# Patient Record
Sex: FEMALE | Race: BLACK OR AFRICAN AMERICAN | Employment: PART TIME | URBAN - METROPOLITAN AREA
[De-identification: names, ages, dates, MRNs, and addresses within clinical notes are randomized per-mention and may not be internally consistent; named-entity substitution may affect disease eponyms.]

---

## 2019-02-21 ENCOUNTER — OFFICE VISIT (OUTPATIENT)
Dept: OBGYN CLINIC | Age: 49
End: 2019-02-21

## 2019-02-21 VITALS
RESPIRATION RATE: 18 BRPM | WEIGHT: 165 LBS | SYSTOLIC BLOOD PRESSURE: 145 MMHG | BODY MASS INDEX: 29.23 KG/M2 | HEART RATE: 70 BPM | HEIGHT: 63 IN | DIASTOLIC BLOOD PRESSURE: 82 MMHG

## 2019-02-21 DIAGNOSIS — D25.9 UTERINE LEIOMYOMA, UNSPECIFIED LOCATION: Primary | ICD-10-CM

## 2019-02-21 RX ORDER — NAPROXEN 500 MG/1
500 TABLET ORAL 2 TIMES DAILY WITH MEALS
COMMUNITY
End: 2019-05-04

## 2019-02-21 RX ORDER — TRANEXAMIC ACID 650 1/1
1300 TABLET ORAL 3 TIMES DAILY
Qty: 30 TAB | Refills: 0 | Status: SHIPPED | OUTPATIENT
Start: 2019-02-21 | End: 2019-05-04

## 2019-02-21 NOTE — PROGRESS NOTES
Subjective:      Patient complains of very heavy bleeding which occurs about every 2-3 weeks and lasts a week at a time. She says that the problems has been getting progressively worse the last few years, and that she has been told she has significant fibroids. She also complains of fatigue and occasional dizziness. Current Outpatient Medications   Medication Sig Dispense Refill    naproxen (NAPROSYN) 500 mg tablet Take 500 mg by mouth two (2) times daily (with meals).  tranexamic acid (LYSTEDA) 650 mg tab tablet Take 2 Tabs by mouth three (3) times daily. 30 Tab 0    prenatal multivit-ca-min-fe-fa Tab Take  by mouth.  HYDROcodone-acetaminophen (NORCO) 5-325 mg per tablet Take 1-2 tablets PO every 4-6 hours as needed for pain control. If over the counter ibuprofen or acetaminophen was suggested, then only take the vicodin for pain not well controlled with the over the counter medication. 12 Tab 0     No Known Allergies  Past Medical History:   Diagnosis Date    Low iron      Past Surgical History:   Procedure Laterality Date    HX  SECTION       History reviewed. No pertinent family history. Social History     Tobacco Use    Smoking status: Never Smoker   Substance Use Topics    Alcohol use: Yes     Frequency: Monthly or less     Drinks per session: 3 or 4     Binge frequency: Less than monthly      Review of Systems    A comprehensive review of systems was negative except for that written in the HPI. Objective:     @IPVITALS(8:6,8,9,5,10)@  : Body mass index is 29.23 kg/m². Visit Vitals  /82   Pulse 70   Resp 18   Ht 5' 3\" (1.6 m)   Wt 165 lb (74.8 kg)   LMP 2018   BMI 29.23 kg/m²     General appearance: alert, cooperative, no distress, appears stated age  Head: Normocephalic, without obvious abnormality, atraumatic  Lungs: normal respiratory effort  Abdomen: soft, non-tender.  Bowel sounds normal. No masses,  no organomegaly  Pelvic: External genitalia normal, Vagina normal with active bleeding, cervix normal in appearance, no CMT, uterus enlarged to 18 weeks size and irregular with tenderness anteriorly and on the right, no adnexal masses or tenderness, rectovaginal septum normal        Assessment/Plan:     Uterine fibroids with menometrorrhagia. Pelvic ultrasound ordered, Rx Lysteda for control of bleeding. History of abnormal Pap with uncertain follow up. Records requested. Follow up after US to discuss treatment options. Plan of care discussed. Patient expressed understanding.

## 2019-03-04 ENCOUNTER — HOSPITAL ENCOUNTER (OUTPATIENT)
Dept: ULTRASOUND IMAGING | Age: 49
Discharge: HOME | End: 2019-03-04
Attending: OBSTETRICS & GYNECOLOGY
Payer: MEDICAID

## 2019-03-04 DIAGNOSIS — D25.9 UTERINE LEIOMYOMA, UNSPECIFIED LOCATION: ICD-10-CM

## 2019-03-04 PROCEDURE — 76830 TRANSVAGINAL US NON-OB: CPT

## 2019-03-26 ENCOUNTER — HOSPITAL ENCOUNTER (OUTPATIENT)
Dept: LAB | Age: 49
Discharge: HOME | End: 2019-03-26

## 2019-03-26 ENCOUNTER — OFFICE VISIT (OUTPATIENT)
Dept: OBGYN CLINIC | Age: 49
End: 2019-03-26

## 2019-03-26 VITALS
TEMPERATURE: 97.8 F | WEIGHT: 161 LBS | HEART RATE: 58 BPM | BODY MASS INDEX: 28.53 KG/M2 | SYSTOLIC BLOOD PRESSURE: 148 MMHG | DIASTOLIC BLOOD PRESSURE: 97 MMHG | RESPIRATION RATE: 18 BRPM | HEIGHT: 63 IN

## 2019-03-26 DIAGNOSIS — N92.1 MENOMETRORRHAGIA: Primary | ICD-10-CM

## 2019-03-26 DIAGNOSIS — N92.1 MENOMETRORRHAGIA: ICD-10-CM

## 2019-03-26 DIAGNOSIS — R87.612 LOW GRADE SQUAMOUS INTRAEPITHELIAL LESION ON CYTOLOGIC SMEAR OF CERVIX (LGSIL): ICD-10-CM

## 2019-03-26 DIAGNOSIS — D25.9 UTERINE LEIOMYOMA, UNSPECIFIED LOCATION: ICD-10-CM

## 2019-03-26 DIAGNOSIS — R10.2 PELVIC PAIN IN FEMALE: ICD-10-CM

## 2019-03-26 LAB — XX-LABCORP SPECIMEN COL,LCBCF: NORMAL

## 2019-03-26 PROCEDURE — 99001 SPECIMEN HANDLING PT-LAB: CPT

## 2019-03-26 RX ORDER — MEDROXYPROGESTERONE ACETATE 10 MG/1
10 TABLET ORAL DAILY
Qty: 30 TAB | Refills: 1 | Status: SHIPPED | OUTPATIENT
Start: 2019-03-26 | End: 2019-05-21 | Stop reason: SDUPTHER

## 2019-03-26 NOTE — PROGRESS NOTES
Subjective:      Patient presents for follow up of menometrorrhagia and to review a pelvic ultrasound which was done since her last visit. She states that she has been bleeding at least part of every day since her last visit, and that the Lysteda prescribed for her actually made the bleeding worse. She also complains of small bruises which have begun appearing on her arms and legs without any known trauma, and she complains of right sided pelvic pain. She states that the pain is minor, and that her primary concern is to stop the bleeding. Previous review of her chart showed cervical dysplasia without follow up. Current Outpatient Medications   Medication Sig Dispense Refill    medroxyPROGESTERone (PROVERA) 10 mg tablet Take 1 Tab by mouth daily. 30 Tab 1    naproxen (NAPROSYN) 500 mg tablet Take 500 mg by mouth two (2) times daily (with meals).  tranexamic acid (LYSTEDA) 650 mg tab tablet Take 2 Tabs by mouth three (3) times daily. 30 Tab 0    prenatal multivit-ca-min-fe-fa Tab Take  by mouth.  HYDROcodone-acetaminophen (NORCO) 5-325 mg per tablet Take 1-2 tablets PO every 4-6 hours as needed for pain control. If over the counter ibuprofen or acetaminophen was suggested, then only take the vicodin for pain not well controlled with the over the counter medication. 12 Tab 0     No Known Allergies  Past Medical History:   Diagnosis Date    Low iron      Past Surgical History:   Procedure Laterality Date    HX  SECTION       History reviewed. No pertinent family history. Social History     Tobacco Use    Smoking status: Never Smoker   Substance Use Topics    Alcohol use: Yes     Frequency: Monthly or less     Drinks per session: 3 or 4     Binge frequency: Less than monthly      Review of Systems    A comprehensive review of systems was negative except for that written in the HPI.         Objective:     Visit Vitals  BP (!) 148/97 (BP 1 Location: Left arm, BP Patient Position: Sitting) Pulse (!) 58   Temp 97.8 °F (36.6 °C)   Resp 18   Ht 5' 3\" (1.6 m)   Wt 161 lb (73 kg)   LMP 02/20/2019   BMI 28.52 kg/m²     General appearance: alert, cooperative, no distress, appears stated age  Pelvic: External genitalia normal, Vagina normal without discharge, cervix normal in appearance, no CMT, uterus enlarged to 18 week size with tenderness at the fundus and right uterine border, no adnexal masses or tenderness, rectovaginal septum normal. Pap smear obtained. Pelvic ultrasound from 3/4/19 reviewed and discussed:  1. Enlarged anteverted uterus, measures overall 17.7 x 8.0 x 7.6 cm. Endometrial thickness 7 mm. Heterogeneous hypoechoic cortical mass of the  posterior fundus measures 6.8 x 8.6 x 8.9 cm and is most compatible with a  Fibroid. Previous records reviewed:  3/7/12 Pap shows ASCUS, cannot rule out high grade   5/4/12 surgical pathology shows LGSIL on ECC with normal ectocervical biopsy    Assessment/Plan:     Menometrorrhagia, Rx Provera. Discussed treatment options of endometrial ablation or hysterectomy, but patient states that she desires treatment which will guarantee no bleeding. Will consult with Dr. Rebecca Akers to determine if the patient is a candidate for a robotic hysterectomy. CBC pending  Endocervical dysplasia without follow up, Pap repeated today. Will contact patient with Pap and lab results. Plan of care discussed. Patient expressed understanding.

## 2019-03-27 LAB
BASOPHILS # BLD AUTO: 0.2 X10E3/UL (ref 0–0.2)
BASOPHILS NFR BLD AUTO: 3 %
EOSINOPHIL # BLD AUTO: 0.4 X10E3/UL (ref 0–0.4)
EOSINOPHIL NFR BLD AUTO: 7 %
ERYTHROCYTE [DISTWIDTH] IN BLOOD BY AUTOMATED COUNT: 19.1 % (ref 12.3–15.4)
HCT VFR BLD AUTO: 29 % (ref 34–46.6)
HGB BLD-MCNC: 8.5 G/DL (ref 11.1–15.9)
IMM GRANULOCYTES # BLD AUTO: 0 X10E3/UL (ref 0–0.1)
IMM GRANULOCYTES NFR BLD AUTO: 0 %
LYMPHOCYTES # BLD AUTO: 1.4 X10E3/UL (ref 0.7–3.1)
LYMPHOCYTES NFR BLD AUTO: 27 %
MCH RBC QN AUTO: 20.6 PG (ref 26.6–33)
MCHC RBC AUTO-ENTMCNC: 29.3 G/DL (ref 31.5–35.7)
MCV RBC AUTO: 70 FL (ref 79–97)
MONOCYTES # BLD AUTO: 0.4 X10E3/UL (ref 0.1–0.9)
MONOCYTES NFR BLD AUTO: 8 %
NEUTROPHILS # BLD AUTO: 2.9 X10E3/UL (ref 1.4–7)
NEUTROPHILS NFR BLD AUTO: 55 %
PLATELET # BLD AUTO: 353 X10E3/UL (ref 150–379)
RBC # BLD AUTO: 4.12 X10E6/UL (ref 3.77–5.28)
SPECIMEN STATUS REPORT, ROLRST: NORMAL
WBC # BLD AUTO: 5.2 X10E3/UL (ref 3.4–10.8)

## 2019-03-28 LAB
CYTOLOGIST CVX/VAG CYTO: ABNORMAL
CYTOLOGY CVX/VAG DOC THIN PREP: ABNORMAL
DX ICD CODE: ABNORMAL
DX ICD CODE: ABNORMAL
HPV I/H RISK 4 DNA CVX QL PROBE+SIG AMP: NEGATIVE
Lab: ABNORMAL
OTHER STN SPEC: ABNORMAL
PATH REPORT.FINAL DX SPEC: ABNORMAL
PATHOLOGIST CVX/VAG CYTO: ABNORMAL
STAT OF ADQ CVX/VAG CYTO-IMP: ABNORMAL

## 2019-04-04 ENCOUNTER — OFFICE VISIT (OUTPATIENT)
Dept: OBGYN CLINIC | Age: 49
End: 2019-04-04

## 2019-04-04 VITALS
DIASTOLIC BLOOD PRESSURE: 85 MMHG | HEIGHT: 63 IN | WEIGHT: 162 LBS | RESPIRATION RATE: 18 BRPM | BODY MASS INDEX: 28.7 KG/M2 | HEART RATE: 64 BPM | SYSTOLIC BLOOD PRESSURE: 162 MMHG

## 2019-04-04 DIAGNOSIS — D25.9 UTERINE LEIOMYOMA, UNSPECIFIED LOCATION: ICD-10-CM

## 2019-04-04 DIAGNOSIS — N92.1 MENOMETRORRHAGIA: Primary | ICD-10-CM

## 2019-04-04 NOTE — PROGRESS NOTES
49 y/o  presents to me for surgical consult. She has known large fibroids and is interested in hysterectomy. 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 She has been seen by [facility] and had ultrasound and initial work-up. EMB hasn't been done. Visit Vitals  /85   Pulse 64   Resp 18   Ht 5' 3\" (1.6 m)   Wt 162 lb (73.5 kg)   LMP 2019 (Exact Date)   BMI 28.70 kg/m²     Gen: A&OX3, NAD  Abdomen: 18 week sized uterus. Ultrasound:    UTERUS: Enlarged anteverted uterus, measures overall 17.7 x 8.0 x 7.6 cm. Endometrial thickness 7 mm. Heterogeneous hypoechoic cortical mass of the  posterior fundus measures 6.8 x 8.6 x 8.9 cm and is most compatible with a  fibroid. Nabothian cysts within the cervix noted.     RIGHT OVARY: Right ovary measures 3.2 x 1.9 x 2.5 cm and contains a dominant  follicle measuring 1.9 cm. cm.] No ovarian or adnexal masses identified. Color  and spectral Doppler demonstrate normal flow to the right ovary with no evidence  of ovarian torsion. Arterial and venous waveforms are normal.     LEFT OVARY: Not visualized     No adnexal masses.     OTHER: No free fluid identified. Lab Results   Component Value Date/Time    WBC 5.2 2019 12:00 AM    HGB 8.5 (L) 2019 12:00 AM    HCT 29.0 (L) 2019 12:00 AM    PLATELET 712  12:00 AM    MCV 70 (L) 2019 12:00 AM         A/P  Symptomatic fibroids. Anemia. Reviewed options for treatment. Patient somewhat concerned about the surgical options. The plan of care has been discussed with the patient. She has been given the opportunity to ask questions, which seem to have been answered satisfactorily.

## 2019-05-01 ENCOUNTER — OFFICE VISIT (OUTPATIENT)
Dept: OBGYN CLINIC | Age: 49
End: 2019-05-01

## 2019-05-01 VITALS
TEMPERATURE: 98.1 F | HEART RATE: 95 BPM | SYSTOLIC BLOOD PRESSURE: 173 MMHG | WEIGHT: 167 LBS | HEIGHT: 63 IN | RESPIRATION RATE: 18 BRPM | BODY MASS INDEX: 29.59 KG/M2 | DIASTOLIC BLOOD PRESSURE: 82 MMHG

## 2019-05-01 DIAGNOSIS — N94.6 DYSMENORRHEA: Primary | ICD-10-CM

## 2019-05-01 LAB
HCG URINE, QL. (POC): NEGATIVE
VALID INTERNAL CONTROL?: YES

## 2019-05-01 NOTE — PROGRESS NOTES
51 y/o  presents for EMB. She has large fibroids and desires a hysterectomy. She states she continues to have daily pain and bleeding. ROS: per above    Visit Vitals  /82   Pulse 95   Temp 98.1 °F (36.7 °C) (Oral)   Resp 18   Ht 5' 3\" (1.6 m)   Wt 167 lb (75.8 kg)   LMP  (LMP Unknown)   BMI 29.58 kg/m²     Gen: A&Ox3, NAD  Exam: 18 week sized fibroids. Diffusely TTP    A/P  Large symptomatic fibroids with recent ASCUS pap smear. Recommend removal via hysterectomy. EMB done today. Has uncontrolled hypertension. Clearance form given. Pt. Will call office when clearance obtained for scheduling. The plan of care has been discussed with the patient. She has been given the opportunity to ask questions, which seem to have been answered satisfactorily.

## 2019-05-01 NOTE — PROCEDURES
Endometrial Biopsy Procedure Note    Endometrial biopsy was performed today. Pre-procedural pain: 5:10    Time out: 11:27    Indications: abnormal uterine bleeding    Procedure Details   Urine pregnancy test was done and result is negative. The risks (including infection, bleeding, pain, and uterine perforation) and benefits of the procedure were explained to the her and written informed consent was obtained. Antibiotic prophylaxis against endocarditis was not indicated. She was placed in the dorsal lithotomy position. Bimanual exam showed the uterus to be in the neutral position. A speculum inserted in the vagina, and the cervix prepped with povidone iodine. A \"Pipelle endometrial aspirator\" was used to sample the endometrium. Sample was sent for pathologic examination. The patient tolerated the procedure well and there were no complications. Post procedural pain: 5:10    Plan:  Ms. Mariela Koehler was advised to call for any fever or for prolonged or severe pain or bleeding. She was advised to use OTC ibuprofen as needed for mild to moderate pain. She was advised to avoid vaginal intercourse for 48 hours or until the bleeding has completely stopped. My office will get in touch with her as soon as we have the pathology report.

## 2019-05-01 NOTE — LETTER
NOTIFICATION RETURN TO WORK / SCHOOL 
 
5/1/2019 11:54 AM 
 
Ms. Dorie Vail [address] [place] [id number] To Whom It May Concern: 
 
Dorie Vail is currently under the care of [address] [place] [address]. She will return to work/school on: May 7, 5283 due to gyn complications. If there are questions or concerns please have the patient contact our office. Sincerely, Matt Hand MD

## 2019-05-01 NOTE — PATIENT INSTRUCTIONS
Endometrial Biopsy: About This Test  What is it? An endometrial biopsy is a way for your doctor to take a small sample of the lining of the uterus (endometrium). The sample is looked at under a microscope for abnormal cells. An endometrial biopsy helps your doctor find problems in the endometrium. Why is this test done? An endometrial biopsy is done to check for cancer of the uterus. The test is also done if you have abnormal bleeding from your uterus or are having problems getting pregnant. The test results show how your body's hormones are affecting the lining of the uterus. How can you prepare for the test?  Talk to your doctor about all your health conditions before the test. For example, tell your doctor if you:  · Are or might be pregnant. An endometrial biopsy is not done during pregnancy. · Are taking any medicines. · Are allergic to any medicines. · Have had bleeding problems, or if you take aspirin or some other blood thinner. · Have been treated for an infection in your pelvic area. · Have any heart or lung problems. Other ways to prepare:  · Do not douche, use tampons, or use vaginal medicines for 24 hours before the test.  · Ask your doctor if you should take a pain reliever, such as ibuprofen (Advil or Motrin), 30 to 60 minutes before the test. This can help reduce any cramping pain that the test can cause. · Talk to your doctor if you have concerns about the need for the test, its risks, how it will be done, or what the results may mean. What happens before the test?  · You will empty your bladder just before the test.  · You will be asked to sign a consent form that says you understand the risks of the test and agree to have it done. What happens during the test?  · You will lie on an exam table. Your feet will be in stirrups. · The doctor may use a spray or injection to numb your cervix. The cervix is the opening to the uterus.   · The doctor will use a tool called a speculum to see the cervix. · Then the doctor will pass a thin tube through the cervix to take a sample of the uterus lining. You may feel a sharp cramp when the doctor collects the sample. · The sample is sent to a lab. How long does the test take? The test will take about 5 to 15 minutes. What happens after the test?  · You will probably be able to go home right away. · You likely will have mild vaginal bleeding and may have cramps for a few days after the test. The cramps may feel like bad menstrual cramps. · Ask your doctor if you can take an over-the-counter pain medicine, such as acetaminophen (Tylenol), ibuprofen (Advil, Motrin), or naproxen (Aleve). Be safe with medicines. Read and follow all instructions on the label. · Do not have sex, use tampons, or douche until the spotting stops. Use pads for vaginal bleeding or discharge. · Do not do strenuous exercise or heavy lifting for one day after your biopsy. Follow-up care is a key part of your treatment and safety. Be sure to make and go to all appointments, and call your doctor if you are having problems. It's also a good idea to keep a list of the medicines you take. Ask your doctor when you can expect to have your test results. Where can you learn more? Go to http://sherrie-wally.info/. Enter [phone] in the search box to learn more about \"Endometrial Biopsy: About This Test.\"  Current as of: May 14, 2018  Content Version: 11.9  © 6717-1325 [employer]. Care instructions adapted under license by [employer] (which disclaims liability or warranty for this information). If you have questions about a medical condition or this instruction, always ask your healthcare professional. [address] any warranty or liability for your use of this information.

## 2019-05-01 NOTE — PROGRESS NOTES
Patient in office today for Endometrial Biopsy procedure performed by Concepción Celis  Patient consent signed and witness   Time out 1227 start time 1231end time 1235  Pre procedural pain 5/10   Post procedural pain 5/10  Biopsy  sent to lab   Follow up instruction given to  Follow up in office in 2 weeks

## 2019-05-04 ENCOUNTER — APPOINTMENT (OUTPATIENT)
Dept: ULTRASOUND IMAGING | Age: 49
End: 2019-05-04
Attending: NURSE PRACTITIONER
Payer: MEDICAID

## 2019-05-04 ENCOUNTER — HOSPITAL ENCOUNTER (EMERGENCY)
Age: 49
Discharge: HOME | End: 2019-05-04
Attending: EMERGENCY MEDICINE
Payer: MEDICAID

## 2019-05-04 VITALS
TEMPERATURE: 99.2 F | WEIGHT: 160 LBS | RESPIRATION RATE: 19 BRPM | HEART RATE: 71 BPM | HEIGHT: 63 IN | OXYGEN SATURATION: 98 % | DIASTOLIC BLOOD PRESSURE: 107 MMHG | SYSTOLIC BLOOD PRESSURE: 142 MMHG | BODY MASS INDEX: 28.35 KG/M2

## 2019-05-04 DIAGNOSIS — N93.8 DUB (DYSFUNCTIONAL UTERINE BLEEDING): Primary | ICD-10-CM

## 2019-05-04 DIAGNOSIS — D25.9 UTERINE LEIOMYOMA, UNSPECIFIED LOCATION: ICD-10-CM

## 2019-05-04 DIAGNOSIS — R10.2 PELVIC PAIN: ICD-10-CM

## 2019-05-04 LAB
ABO + RH BLD: NORMAL
ALBUMIN SERPL-MCNC: 3.6 G/DL (ref 3.4–5)
ALBUMIN/GLOB SERPL: 0.8 {RATIO} (ref 0.8–1.7)
ALP SERPL-CCNC: 71 U/L (ref 45–117)
ALT SERPL-CCNC: 19 U/L (ref 13–56)
ANION GAP SERPL CALC-SCNC: 7 MMOL/L (ref 3–18)
APPEARANCE UR: CLEAR
AST SERPL-CCNC: 15 U/L (ref 15–37)
BACTERIA URNS QL MICRO: ABNORMAL /HPF
BASOPHILS # BLD: 0.1 K/UL (ref 0–0.1)
BASOPHILS NFR BLD: 2 % (ref 0–2)
BILIRUB SERPL-MCNC: 0.3 MG/DL (ref 0.2–1)
BILIRUB UR QL: NEGATIVE
BLOOD GROUP ANTIBODIES SERPL: NORMAL
BUN SERPL-MCNC: 7 MG/DL (ref 7–18)
BUN/CREAT SERPL: 10 (ref 12–20)
CALCIUM SERPL-MCNC: 8.7 MG/DL (ref 8.5–10.1)
CHLORIDE SERPL-SCNC: 107 MMOL/L (ref 100–108)
CO2 SERPL-SCNC: 25 MMOL/L (ref 21–32)
COLOR UR: YELLOW
CREAT SERPL-MCNC: 0.72 MG/DL (ref 0.6–1.3)
DIFFERENTIAL METHOD BLD: ABNORMAL
EOSINOPHIL # BLD: 0.2 K/UL (ref 0–0.4)
EOSINOPHIL NFR BLD: 3 % (ref 0–5)
EPITH CASTS URNS QL MICRO: ABNORMAL /LPF (ref 0–5)
ERYTHROCYTE [DISTWIDTH] IN BLOOD BY AUTOMATED COUNT: 18.3 % (ref 11.6–14.5)
GLOBULIN SER CALC-MCNC: 4.3 G/DL (ref 2–4)
GLUCOSE SERPL-MCNC: 81 MG/DL (ref 74–99)
GLUCOSE UR STRIP.AUTO-MCNC: NEGATIVE MG/DL
HCT VFR BLD AUTO: 29.5 % (ref 35–45)
HGB BLD-MCNC: 9.1 G/DL (ref 12–16)
HGB UR QL STRIP: ABNORMAL
KETONES UR QL STRIP.AUTO: NEGATIVE MG/DL
LEUKOCYTE ESTERASE UR QL STRIP.AUTO: ABNORMAL
LYMPHOCYTES # BLD: 1.7 K/UL (ref 0.9–3.6)
LYMPHOCYTES NFR BLD: 27 % (ref 21–52)
MCH RBC QN AUTO: 20.8 PG (ref 24–34)
MCHC RBC AUTO-ENTMCNC: 30.8 G/DL (ref 31–37)
MCV RBC AUTO: 67.4 FL (ref 74–97)
MONOCYTES # BLD: 0.5 K/UL (ref 0.05–1.2)
MONOCYTES NFR BLD: 7 % (ref 3–10)
NEUTS SEG # BLD: 3.8 K/UL (ref 1.8–8)
NEUTS SEG NFR BLD: 61 % (ref 40–73)
NITRITE UR QL STRIP.AUTO: NEGATIVE
PH UR STRIP: 6.5 [PH] (ref 5–8)
PLATELET # BLD AUTO: 387 K/UL (ref 135–420)
PMV BLD AUTO: 10.2 FL (ref 9.2–11.8)
POTASSIUM SERPL-SCNC: 3.6 MMOL/L (ref 3.5–5.5)
PROT SERPL-MCNC: 7.9 G/DL (ref 6.4–8.2)
PROT UR STRIP-MCNC: NEGATIVE MG/DL
RBC # BLD AUTO: 4.38 M/UL (ref 4.2–5.3)
RBC #/AREA URNS HPF: ABNORMAL /HPF (ref 0–5)
SERVICE CMNT-IMP: NORMAL
SODIUM SERPL-SCNC: 139 MMOL/L (ref 136–145)
SP GR UR REFRACTOMETRY: 1.01 (ref 1–1.03)
SPECIMEN EXP DATE BLD: NORMAL
UROBILINOGEN UR QL STRIP.AUTO: 1 EU/DL (ref 0.2–1)
WBC # BLD AUTO: 6.3 K/UL (ref 4.6–13.2)
WBC URNS QL MICRO: ABNORMAL /HPF (ref 0–4)
WET PREP GENITAL: NORMAL

## 2019-05-04 PROCEDURE — 76856 US EXAM PELVIC COMPLETE: CPT

## 2019-05-04 PROCEDURE — 80053 COMPREHEN METABOLIC PANEL: CPT

## 2019-05-04 PROCEDURE — 96374 THER/PROPH/DIAG INJ IV PUSH: CPT

## 2019-05-04 PROCEDURE — 96375 TX/PRO/DX INJ NEW DRUG ADDON: CPT

## 2019-05-04 PROCEDURE — 87210 SMEAR WET MOUNT SALINE/INK: CPT

## 2019-05-04 PROCEDURE — 81001 URINALYSIS AUTO W/SCOPE: CPT

## 2019-05-04 PROCEDURE — 99284 EMERGENCY DEPT VISIT MOD MDM: CPT

## 2019-05-04 PROCEDURE — 86900 BLOOD TYPING SEROLOGIC ABO: CPT

## 2019-05-04 PROCEDURE — 87491 CHLMYD TRACH DNA AMP PROBE: CPT

## 2019-05-04 PROCEDURE — 85025 COMPLETE CBC W/AUTO DIFF WBC: CPT

## 2019-05-04 PROCEDURE — 74011250637 HC RX REV CODE- 250/637: Performed by: NURSE PRACTITIONER

## 2019-05-04 PROCEDURE — 74011250636 HC RX REV CODE- 250/636: Performed by: NURSE PRACTITIONER

## 2019-05-04 RX ORDER — KETOROLAC TROMETHAMINE 30 MG/ML
15 INJECTION, SOLUTION INTRAMUSCULAR; INTRAVENOUS
Status: COMPLETED | OUTPATIENT
Start: 2019-05-04 | End: 2019-05-04

## 2019-05-04 RX ORDER — IBUPROFEN 800 MG/1
800 TABLET ORAL
Qty: 20 TAB | Refills: 0 | Status: SHIPPED | OUTPATIENT
Start: 2019-05-04 | End: 2019-05-11

## 2019-05-04 RX ORDER — OXYCODONE AND ACETAMINOPHEN 5; 325 MG/1; MG/1
1 TABLET ORAL
Status: COMPLETED | OUTPATIENT
Start: 2019-05-04 | End: 2019-05-04

## 2019-05-04 RX ORDER — MORPHINE SULFATE 4 MG/ML
4 INJECTION INTRAVENOUS
Status: DISCONTINUED | OUTPATIENT
Start: 2019-05-04 | End: 2019-05-04

## 2019-05-04 RX ORDER — MORPHINE SULFATE 4 MG/ML
4 INJECTION INTRAVENOUS
Status: COMPLETED | OUTPATIENT
Start: 2019-05-04 | End: 2019-05-04

## 2019-05-04 RX ORDER — ONDANSETRON 2 MG/ML
4 INJECTION INTRAMUSCULAR; INTRAVENOUS
Status: COMPLETED | OUTPATIENT
Start: 2019-05-04 | End: 2019-05-04

## 2019-05-04 RX ADMIN — OXYCODONE HYDROCHLORIDE AND ACETAMINOPHEN 1 TABLET: 5; 325 TABLET ORAL at 16:11

## 2019-05-04 RX ADMIN — ONDANSETRON 4 MG: 2 INJECTION INTRAMUSCULAR; INTRAVENOUS at 14:36

## 2019-05-04 RX ADMIN — MORPHINE SULFATE 4 MG: 4 INJECTION INTRAVENOUS at 14:36

## 2019-05-04 RX ADMIN — KETOROLAC TROMETHAMINE 15 MG: 30 INJECTION, SOLUTION INTRAMUSCULAR at 16:11

## 2019-05-04 NOTE — ED NOTES
Patient arrived with a CC of heavy vaginal bleeding for 4 days since endometrial biopsy was completed. Patient reports soaking through a pad an hour since procedure and worsening pain. Patient smokes to her GYN who told her to come into the ED for further evaluation. I performed a brief evaluation, including history and physical, of the patient here in triage and I have determined that pt will need further treatment and evaluation from the main side ER physician. I have placed initial orders to help in expediting patients care. May 04, 2019 at 1:38 PM - Rupa Steinberg NP Visit Vitals /84 (BP 1 Location: Right arm, BP Patient Position: Sitting) Pulse 71 Temp 99.2 °F (37.3 °C) Resp 19 Ht 5' 3\" (1.6 m) Wt 72.6 kg (160 lb) SpO2 98% BMI 28.34 kg/m² 1:39 PM I called and left a message on Dr Long PAULINO regarding patient

## 2019-05-04 NOTE — ED PROVIDER NOTES
2:10 PM    50 y.o. female presents to ED C/O vaginal bleeding, lower abdominal pain. Patient has a HX of , low iron. Patient reports she had a endometrial biopsy 4 days ago. Patient reports since the night of the biopsy she has had worsening vaginal bleeding cramping, patient reports going to a pad an hour since onset of bleeding. Patient reports worsening cramping in lower abdomen today. Patient called her GYN today who told her to go the emergency department. Patient does report some mild lightheadedness denies dizziness, shortness of breath, chest pain. She is a non-smoker. Patient denies any other symptoms or complaints. Past Medical History:   Diagnosis Date    Low iron        Past Surgical History:   Procedure Laterality Date    HX  SECTION           History reviewed. No pertinent family history.     Social History     Socioeconomic History    Marital status: SINGLE     Spouse name: Not on file    Number of children: Not on file    Years of education: Not on file    Highest education level: Not on file   Occupational History    Not on file   Social Needs    Financial resource strain: Not on file    Food insecurity:     Worry: Not on file     Inability: Not on file    Transportation needs:     Medical: Not on file     Non-medical: Not on file   Tobacco Use    Smoking status: Never Smoker    Smokeless tobacco: Never Used   Substance and Sexual Activity    Alcohol use: Yes     Frequency: Monthly or less     Drinks per session: 3 or 4     Binge frequency: Less than monthly    Drug use: No    Sexual activity: Not Currently     Partners: Male     Birth control/protection: None   Lifestyle    Physical activity:     Days per week: Not on file     Minutes per session: Not on file    Stress: Not on file   Relationships    Social connections:     Talks on phone: Not on file     Gets together: Not on file     Attends Jew service: Not on file     Active member of club or organization: Not on file     Attends meetings of clubs or organizations: Not on file     Relationship status: Not on file    Intimate partner violence:     Fear of current or ex partner: Not on file     Emotionally abused: Not on file     Physically abused: Not on file     Forced sexual activity: Not on file   Other Topics Concern    Not on file   Social History Narrative    Not on file         ALLERGIES: Patient has no known allergies. Review of Systems   Constitutional: Negative for appetite change and fever. HENT: Negative for congestion, rhinorrhea and sore throat. Respiratory: Negative for cough, shortness of breath and wheezing. Cardiovascular: Negative for chest pain and leg swelling. Gastrointestinal: Positive for abdominal pain. Negative for constipation, diarrhea, nausea and vomiting. Genitourinary: Positive for pelvic pain and vaginal bleeding. Negative for dysuria. Musculoskeletal: Negative for arthralgias and back pain. Neurological: Negative for dizziness, syncope and headaches. All other systems reviewed and are negative. Vitals:    05/04/19 1335 05/04/19 1547   BP: 138/84 (!) 157/92   Pulse: 71    Resp: 19    Temp: 99.2 °F (37.3 °C)    SpO2: 98%    Weight: 72.6 kg (160 lb)    Height: 5' 3\" (1.6 m)             Physical Exam   Constitutional: She is oriented to person, place, and time. She appears well-developed and well-nourished. No distress. HENT:   Right Ear: External ear normal.   Left Ear: External ear normal.   Mouth/Throat: Oropharynx is clear and moist.   Cardiovascular: Normal rate, regular rhythm and intact distal pulses. Pulmonary/Chest: Effort normal and breath sounds normal. No stridor. No respiratory distress. She has no wheezes. She has no rales. Abdominal: Soft. Bowel sounds are normal. There is tenderness in the suprapubic area. There is no rigidity, no guarding and no CVA tenderness.    Genitourinary:   Genitourinary Comments: Please see pelvic exam.   Musculoskeletal: Normal range of motion. She exhibits no edema or tenderness. Neurological: She is alert and oriented to person, place, and time. She exhibits normal muscle tone. Coordination normal.   Skin: Skin is warm and dry. She is not diaphoretic. No erythema. Nursing note and vitals reviewed. MDM  Number of Diagnoses or Management Options  Diagnosis management comments: Differential Diagnosis:  Menses, AUB/DUB, , normal pregnancy, uterine leiomyomas, cervical or endometrial polyps, pelvic tumors, systemic disorders, hypothyroidism,exogenous hormone use,     Plan: CBC, CMP, type and screen, pelvic ultrasound. Pelvic exam, UA      4:17 PM reassuring ultrasound with evidence of fibroid without significant free fluid or other abnormality. Reviewed with Dr. Grant agrees with plan for discharge home. Educated take 800 mg Motrin, increase Provera to 2 pills for 3 days. Follow-up with Dr. Ally Arnold on Monday. Patient's H&H improved from previous studies, no evidence of concerning bleeding. Patient educated to return the ED for any new or worsening symptoms. Patient referred to primary care doctor for further evaluation of elevated blood pressure. Patient denies further questions. Amount and/or Complexity of Data Reviewed  Clinical lab tests: ordered and reviewed  Tests in the radiology section of CPT®: ordered and reviewed      ED Course as of May 04 1620   Sat May 04, 2019   1357 CONSULT NOTE:   1:57 PM  I spoke with dR Olvera   Specialty: gyn  Discussed pt's hx, disposition, and available diagnostic and imaging results. Reviewed care plans. Consulting physician agrees with plans as outlined. Agrees with labs ordered and plan to order pelvic ultrasound.  .         [SH]   6080 Patient to ultrasound    [SH]   1553 Patient reports worsening pain after returning from ultrasound, will order 1 additional dose of pain medication.    [SH]   1601 Dr Grant in the department evaluating patient.     []   1115 CONSULT NOTE:   4:05 PM  I spoke with Dr Alyce Malin   Specialty: GYN  Discussed pt's hx, disposition, and available diagnostic and imaging results. Reviewed care plans. Consulting physician agrees with plans as outlined. Due to imaging findings, improving H&H, recommends discharging home with 800 mg Motrin, and doubling Provera for 3 days. .         []      ED Course User Index  [] Kaleigh Nelson NP       Pelvic Exam  Date/Time: 5/4/2019 2:36 PM  Performed by: LEYDA  Chaperone: [place] RN. Type of exam performed: bimanual and speculum. External genitalia appearance: normal.    Vaginal exam:  bleeding. Bleeding: moderate  Cervical exam:  inadequately visualized. Bimanual exam:  uterine tenderness (Adnexal tenderness palpation, uterine tenderness or border. ).                 RESULTS:    US PELV NON OB W TV    (Results Pending)       Labs Reviewed   CBC WITH AUTOMATED DIFF - Abnormal; Notable for the following components:       Result Value    HGB 9.1 (*)     HCT 29.5 (*)     MCV 67.4 (*)     MCH 20.8 (*)     MCHC 30.8 (*)     RDW 18.3 (*)     All other components within normal limits   METABOLIC PANEL, COMPREHENSIVE - Abnormal; Notable for the following components:    BUN/Creatinine ratio 10 (*)     Globulin 4.3 (*)     All other components within normal limits   URINALYSIS W/ RFLX MICROSCOPIC - Abnormal; Notable for the following components:    Blood LARGE (*)     Leukocyte Esterase TRACE (*)     All other components within normal limits   URINE MICROSCOPIC ONLY - Abnormal; Notable for the following components:    Bacteria FEW (*)     All other components within normal limits   WET PREP   CHLAMYDIA/NEISSERIA AMPLIFICATION   TYPE & SCREEN       Recent Results (from the past 12 hour(s))   CBC WITH AUTOMATED DIFF    Collection Time: 05/04/19  1:45 PM   Result Value Ref Range    WBC 6.3 4.6 - 13.2 K/uL    RBC 4.38 4.20 - 5.30 M/uL    HGB 9.1 (L) 12.0 - 16.0 g/dL    HCT 29.5 (L) 35.0 - 45.0 %    MCV 67.4 (L) 74.0 - 97.0 FL    MCH 20.8 (L) 24.0 - 34.0 PG    MCHC 30.8 (L) 31.0 - 37.0 g/dL    RDW 18.3 (H) 11.6 - 14.5 %    PLATELET 732 994 - 679 K/uL    MPV 10.2 9.2 - 11.8 FL    NEUTROPHILS 61 40 - 73 %    LYMPHOCYTES 27 21 - 52 %    MONOCYTES 7 3 - 10 %    EOSINOPHILS 3 0 - 5 %    BASOPHILS 2 0 - 2 %    ABS. NEUTROPHILS 3.8 1.8 - 8.0 K/UL    ABS. LYMPHOCYTES 1.7 0.9 - 3.6 K/UL    ABS. MONOCYTES 0.5 0.05 - 1.2 K/UL    ABS. EOSINOPHILS 0.2 0.0 - 0.4 K/UL    ABS. BASOPHILS 0.1 0.0 - 0.1 K/UL    DF AUTOMATED     METABOLIC PANEL, COMPREHENSIVE    Collection Time: 05/04/19  1:45 PM   Result Value Ref Range    Sodium 139 136 - 145 mmol/L    Potassium 3.6 3.5 - 5.5 mmol/L    Chloride 107 100 - 108 mmol/L    CO2 25 21 - 32 mmol/L    Anion gap 7 3.0 - 18 mmol/L    Glucose 81 74 - 99 mg/dL    BUN 7 7.0 - 18 MG/DL    Creatinine 0.72 0.6 - 1.3 MG/DL    BUN/Creatinine ratio 10 (L) 12 - 20      GFR est AA >60 >60 ml/min/1.73m2    GFR est non-AA >60 >60 ml/min/1.73m2    Calcium 8.7 8.5 - 10.1 MG/DL    Bilirubin, total 0.3 0.2 - 1.0 MG/DL    ALT (SGPT) 19 13 - 56 U/L    AST (SGOT) 15 15 - 37 U/L    Alk.  phosphatase 71 45 - 117 U/L    Protein, total 7.9 6.4 - 8.2 g/dL    Albumin 3.6 3.4 - 5.0 g/dL    Globulin 4.3 (H) 2.0 - 4.0 g/dL    A-G Ratio 0.8 0.8 - 1.7     URINALYSIS W/ RFLX MICROSCOPIC    Collection Time: 05/04/19  2:16 PM   Result Value Ref Range    Color YELLOW      Appearance CLEAR      Specific gravity 1.009 1.005 - 1.030      pH (UA) 6.5 5.0 - 8.0      Protein NEGATIVE  NEG mg/dL    Glucose NEGATIVE  NEG mg/dL    Ketone NEGATIVE  NEG mg/dL    Bilirubin NEGATIVE  NEG      Blood LARGE (A) NEG      Urobilinogen 1.0 0.2 - 1.0 EU/dL    Nitrites NEGATIVE  NEG      Leukocyte Esterase TRACE (A) NEG     URINE MICROSCOPIC ONLY    Collection Time: 05/04/19  2:16 PM   Result Value Ref Range    WBC 2 to 3 0 - 4 /hpf    RBC TOO NUMEROUS TO COUNT 0 - 5 /hpf    Epithelial cells FEW 0 - 5 /lpf    Bacteria FEW (A) NEG /hpf   TYPE & SCREEN    Collection Time: 05/04/19  2:20 PM   Result Value Ref Range    Crossmatch Expiration 05/07/2019     ABO/Rh(D) May Shock POSITIVE     Antibody screen NEG    WET PREP    Collection Time: 05/04/19  2:20 PM   Result Value Ref Range    Special Requests: NO SPECIAL REQUESTS      Wet prep NO YEAST,TRICHOMONAS OR CLUE CELLS NOTED         PROGRESS NOTE:   2:10 PM   Initial assessment completed. Written by Kimber Márquez NP-C    DISCHARGE NOTE:  4:20 PM   Lanie Forman  results have been reviewed with her. She has been counseled regarding her diagnosis, treatment, and plan. She verbally conveys understanding and agreement of the signs, symptoms, diagnosis, treatment and prognosis and additionally agrees to follow up as discussed. She also agrees with the care-plan and conveys that all of her questions have been answered. I have also provided discharge instructions for her that include: educational information regarding their diagnosis and treatment, and list of reasons why they would want to return to the ED prior to their follow-up appointment, should her condition change. CLINICAL IMPRESSION:    1. DUB (dysfunctional uterine bleeding)    2. Pelvic pain    3. Uterine leiomyoma, unspecified location        AFTER VISIT PLAN:    Current Discharge Medication List      START taking these medications    Details   ibuprofen (MOTRIN) 800 mg tablet Take 1 Tab by mouth every six (6) hours as needed for Pain for up to 7 days.   Qty: 20 Tab, Refills: 0              Follow-up Information     Follow up With Specialties Details Why Contact Info    iX Aleman MD Internal Medicine Schedule an appointment as soon as possible for a visit in 1 week Further evaluation [id number] [place] [address]  [address] [place] [address] [facility]      Magaly Harper MD Obstetrics & Gynecology, Gynecology, Obstetrics Schedule an appointment as soon as possible for a visit in 2 days Further evaluation - call monday early to get seen on Monday [place] [address] Doctor Jakob Edmondson [place] [id number] [phone]             Written by Yaron HERNANDEZC

## 2019-05-04 NOTE — ED TRIAGE NOTES
Patient had a biopsy on Wednesday, patient has had vaginal bleeding since than, called doctor and told to come to ER

## 2019-05-04 NOTE — DISCHARGE INSTRUCTIONS
Patient Education        Take 2 tabs of Provera for the next 3 days. Abnormal Uterine Bleeding: Care Instructions  Your Care Instructions    Abnormal uterine bleeding (AUB) is irregular bleeding from the uterus that is longer or heavier than usual or does not occur at your regular time. Sometimes it is caused by changes in hormone levels. It can also be caused by growths in the uterus, such as fibroids or polyps. Sometimes a cause cannot be found. You may have heavy bleeding when you are not expecting your period. Your doctor may suggest a pregnancy test, if you think you are pregnant. Follow-up care is a key part of your treatment and safety. Be sure to make and go to all appointments, and call your doctor if you are having problems. It's also a good idea to know your test results and keep a list of the medicines you take. How can you care for yourself at home? · Be safe with medicines. Take pain medicines exactly as directed. ? If the doctor gave you a prescription medicine for pain, take it as prescribed. ? If you are not taking a prescription pain medicine, ask your doctor if you can take an over-the-counter medicine. · You may be low in iron because of blood loss. Eat a balanced diet that is high in iron and vitamin C. Foods rich in iron include red meat, shellfish, eggs, beans, and leafy green vegetables. Talk to your doctor about whether you need to take iron pills or a multivitamin. When should you call for help? Call 911 anytime you think you may need emergency care.  For example, call if:    · You passed out (lost consciousness).    Call your doctor now or seek immediate medical care if:    · You have new or worse belly or pelvic pain.     · You have severe vaginal bleeding.     · You feel dizzy or lightheaded, or you feel like you may faint.    Watch closely for changes in your health, and be sure to contact your doctor if:    · You think you may be pregnant.     · Your bleeding gets worse.     · You do not get better as expected. Where can you learn more? Go to http://sherrie-wally.info/. Enter K503 in the search box to learn more about \"Abnormal Uterine Bleeding: Care Instructions. \"  Current as of: May 14, 2018  Content Version: 11.9  © 7131-8145 [employer]. Care instructions adapted under license by [employer] (which disclaims liability or warranty for this information). If you have questions about a medical condition or this instruction, always ask your healthcare professional. [address] any warranty or liability for your use of this information.

## 2019-05-07 LAB
C TRACH RRNA SPEC QL NAA+PROBE: NEGATIVE
DX ICD CODE: NORMAL
DX ICD CODE: NORMAL
N GONORRHOEA RRNA SPEC QL NAA+PROBE: NEGATIVE
PATH REPORT.FINAL DX SPEC: NORMAL
PATH REPORT.GROSS SPEC: NORMAL
PATH REPORT.RELEVANT HX SPEC: NORMAL
PATH REPORT.SITE OF ORIGIN SPEC: NORMAL
PATHOLOGIST NAME: NORMAL
PAYMENT PROCEDURE: NORMAL
SPECIMEN SOURCE: NORMAL

## 2019-05-09 ENCOUNTER — OFFICE VISIT (OUTPATIENT)
Dept: FAMILY MEDICINE CLINIC | Age: 49
End: 2019-05-09

## 2019-05-09 ENCOUNTER — TELEPHONE (OUTPATIENT)
Dept: FAMILY MEDICINE CLINIC | Age: 49
End: 2019-05-09

## 2019-05-09 VITALS
HEART RATE: 60 BPM | DIASTOLIC BLOOD PRESSURE: 78 MMHG | SYSTOLIC BLOOD PRESSURE: 148 MMHG | OXYGEN SATURATION: 100 % | HEIGHT: 63 IN | TEMPERATURE: 98.1 F | WEIGHT: 160.6 LBS | BODY MASS INDEX: 28.46 KG/M2 | RESPIRATION RATE: 16 BRPM

## 2019-05-09 DIAGNOSIS — D21.9 FIBROIDS: ICD-10-CM

## 2019-05-09 DIAGNOSIS — Z76.89 ENCOUNTER TO ESTABLISH CARE: ICD-10-CM

## 2019-05-09 DIAGNOSIS — D64.9 ANEMIA, UNSPECIFIED TYPE: ICD-10-CM

## 2019-05-09 DIAGNOSIS — I10 ESSENTIAL HYPERTENSION: Primary | ICD-10-CM

## 2019-05-09 RX ORDER — AMLODIPINE BESYLATE 5 MG/1
5 TABLET ORAL DAILY
Qty: 30 TAB | Refills: 0 | Status: SHIPPED | OUTPATIENT
Start: 2019-05-09 | End: 2019-05-21 | Stop reason: SDUPTHER

## 2019-05-09 NOTE — PATIENT INSTRUCTIONS
High Blood Pressure: Care Instructions  Overview    It's normal for blood pressure to go up and down throughout the day. But if it stays up, you have high blood pressure. Another name for high blood pressure is hypertension. Despite what a lot of people think, high blood pressure usually doesn't cause headaches or make you feel dizzy or lightheaded. It usually has no symptoms. But it does increase your risk of stroke, heart attack, and other problems. You and your doctor will talk about your risks of these problems based on your blood pressure. Your doctor will give you a goal for your blood pressure. Your goal will be based on your health and your age. Lifestyle changes, such as eating healthy and being active, are always important to help lower blood pressure. You might also take medicine to reach your blood pressure goal.  Follow-up care is a key part of your treatment and safety. Be sure to make and go to all appointments, and call your doctor if you are having problems. It's also a good idea to know your test results and keep a list of the medicines you take. How can you care for yourself at home? Medical treatment  · If you stop taking your medicine, your blood pressure will go back up. You may take one or more types of medicine to lower your blood pressure. Be safe with medicines. Take your medicine exactly as prescribed. Call your doctor if you think you are having a problem with your medicine. · Talk to your doctor before you start taking aspirin every day. Aspirin can help certain people lower their risk of a heart attack or stroke. But taking aspirin isn't right for everyone, because it can cause serious bleeding. · See your doctor regularly. You may need to see the doctor more often at first or until your blood pressure comes down. · If you are taking blood pressure medicine, talk to your doctor before you take decongestants or anti-inflammatory medicine, such as ibuprofen.  Some of these medicines can raise blood pressure. · Learn how to check your blood pressure at home. Lifestyle changes  · Stay at a healthy weight. This is especially important if you put on weight around the waist. Losing even 10 pounds can help you lower your blood pressure. · If your doctor recommends it, get more exercise. Walking is a good choice. Bit by bit, increase the amount you walk every day. Try for at least 30 minutes on most days of the week. You also may want to swim, bike, or do other activities. · Avoid or limit alcohol. Talk to your doctor about whether you can drink any alcohol. · Try to limit how much sodium you eat to less than 2,300 milligrams (mg) a day. Your doctor may ask you to try to eat less than 1,500 mg a day. · Eat plenty of fruits (such as bananas and oranges), vegetables, legumes, whole grains, and low-fat dairy products. · Lower the amount of saturated fat in your diet. Saturated fat is found in animal products such as milk, cheese, and meat. Limiting these foods may help you lose weight and also lower your risk for heart disease. · Do not smoke. Smoking increases your risk for heart attack and stroke. If you need help quitting, talk to your doctor about stop-smoking programs and medicines. These can increase your chances of quitting for good. When should you call for help? Call 911 anytime you think you may need emergency care. This may mean having symptoms that suggest that your blood pressure is causing a serious heart or blood vessel problem. Your blood pressure may be over 180/120.   For example, call 911 if:    · You have symptoms of a heart attack. These may include:  ? Chest pain or pressure, or a strange feeling in the chest.  ? Sweating. ? Shortness of breath. ? Nausea or vomiting. ? Pain, pressure, or a strange feeling in the back, neck, jaw, or upper belly or in one or both shoulders or arms. ? Lightheadedness or sudden weakness.   ? A fast or irregular heartbeat.     · You have symptoms of a stroke. These may include:  ? Sudden numbness, tingling, weakness, or loss of movement in your face, arm, or leg, especially on only one side of your body. ? Sudden vision changes. ? Sudden trouble speaking. ? Sudden confusion or trouble understanding simple statements. ? Sudden problems with walking or balance. ? A sudden, severe headache that is different from past headaches.     · You have severe back or belly pain.    Do not wait until your blood pressure comes down on its own. Get help right away.   Call your doctor now or seek immediate care if:    · Your blood pressure is much higher than normal (such as 180/120 or higher), but you don't have symptoms.     · You think high blood pressure is causing symptoms, such as:  ? Severe headache.  ? Blurry vision.    Watch closely for changes in your health, and be sure to contact your doctor if:    · Your blood pressure measures higher than your doctor recommends at least 2 times. That means the top number is higher or the bottom number is higher, or both.     · You think you may be having side effects from your blood pressure medicine. Where can you learn more? Go to http://sherrie-wally.info/. Enter X064 in the search box to learn more about \"High Blood Pressure: Care Instructions. \"  Current as of: July 22, 2018  Content Version: 11.9  © 7052-5427 [employer]. Care instructions adapted under license by [employer] (which disclaims liability or warranty for this information). If you have questions about a medical condition or this instruction, always ask your healthcare professional. [place] [address] any warranty or liability for your use of this information.

## 2019-05-09 NOTE — PROGRESS NOTES
Raymundo Cyr is a 50 y.o. female  Chief Complaint   Patient presents with   [facility] Care     1. Have you been to the ER, urgent care clinic since your last visit? Hospitalized since your last visit? Yes Reason for visit: 5/4/19, bleeding    2. Have you seen or consulted any other health care providers outside of the [address] [place] [address] since your last visit? Include any pap smears or colon screening.  No

## 2019-05-09 NOTE — TELEPHONE ENCOUNTER
Spoke with Laina Osuna, Verified 2 patient identifiers. Spoke with patient in regards to pre op exam appointment date change. Patient made aware of new appointment date of [email or web address]. Patient is aware that appointment is with Dr. Kaia Arnold.  Patient acknowledges understanding and voices no further questions or concerns at this time

## 2019-05-09 NOTE — TELEPHONE ENCOUNTER
Spoke with Tiffani Dillon, Verified 2 patient identifiers. Spoke with patient in regards to scheduling her pre op exam. Patient was given her upcoming pre op exam appointment for 5/13/19 @ 8am. Patient made aware that her appointment will be with Dr. Amy Puckett.    Patient acknowledges understanding and voices no further questions or concerns at this time

## 2019-05-09 NOTE — TELEPHONE ENCOUNTER
Returning physicians phone call in regards to mutual patient. Spoke with Dr. Chick Kanner. Patient needs clearance ASAP due to bleeding. Made Dr. Chick Kanner aware that another physician in the office will do the preop clearance. She verbalized understanding. Will call patient with appointment.

## 2019-05-09 NOTE — TELEPHONE ENCOUNTER
PSR for Dr. Klaudia Marquis's office across the street called in and was wanting to speak with Walter Soria in regards to the patient. I tried calling Walter Soria and she was with a patient and the nurse was on the phone so I let the PSR know that Walter Soria and the nurse were unavailable so I would have Walter Soria call back when she was available. The PSR then got really rude and asked \"So she doesn't come to the phone to speak with another physician? \" I let her know she was in with a patient and the nurse was unavailable and apologized for the inconvenience and she thanked me and mumbled something else as she hung up.  The callback number is [phone]

## 2019-05-09 NOTE — PROGRESS NOTES
Salima Love is a 50 y.o.  female and presents with    Chief Complaint   Patient presents with    Establish Care    Hypertension       Subjective:  Ms. Rogelio Fernández presents today to establish care. She has history of fibroids and uncontrolled uterine bleeding. She is seeing [facility] and plan is to have a total hysterectomy, surgery date to be determined. Ms. Rogelio Fernández has had high blood pressure on multiple visits and needs management and clearance prior to surgery. Additional Concerns: Ms. Rogelio Fernández is here to establish care. There is no problem list on file for this patient. Current Outpatient Medications   Medication Sig Dispense Refill    ibuprofen (MOTRIN) 800 mg tablet Take 1 Tab by mouth every six (6) hours as needed for Pain for up to 7 days. 20 Tab 0    medroxyPROGESTERone (PROVERA) 10 mg tablet Take 1 Tab by mouth daily. 30 Tab 1     No Known Allergies  Past Medical History:   Diagnosis Date    Low iron      Past Surgical History:   Procedure Laterality Date    HX  SECTION       Family History   Problem Relation Age of Onset    Breast Cancer Mother     Breast Cancer Sister     No Known Problems Father      Social History     Tobacco Use    Smoking status: Never Smoker    Smokeless tobacco: Never Used   Substance Use Topics    Alcohol use: Yes     Frequency: Monthly or less     Drinks per session: 3 or 4     Binge frequency: Less than monthly       ROS   History obtained from the patient  General ROS: negative for - chills or fever  Psychological ROS: negative for - anxiety or depression  Respiratory ROS: no cough, shortness of breath, or wheezing  Cardiovascular ROS: no chest pain or dyspnea on exertion  Gastrointestinal ROS: no abdominal pain, change in bowel habits, or black or bloody stools  Genito-Urinary ROS: positive for - change in menstrual cycle and irregular/heavy menses    All other systems reviewed and are negative.       Objective:  Vitals: 05/09/19 1051   BP: 148/78   Pulse: 60   Resp: 16   Temp: 98.1 °F (36.7 °C)   TempSrc: Oral   SpO2: 100%   Weight: 160 lb 9.6 oz (72.8 kg)   Height: 5' 3\" (1.6 m)   PainSc:   0 - No pain   LMP: 05/04/2019       PE  General appearance - alert, well appearing, and in no distress  Mental status - normal mood, behavior, speech, dress, motor activity, and thought processes  Chest - clear to auscultation, no wheezes, rales or rhonchi, symmetric air entry  Heart - normal rate and regular rhythm  Abdomen - tenderness noted generalized  Extremities - peripheral pulses normal, no pedal edema, no clubbing or cyanosis      LABS   Lab Results   Component Value Date/Time    WBC 6.3 05/04/2019 01:45 PM    HGB 9.1 (L) 05/04/2019 01:45 PM    HCT 29.5 (L) 05/04/2019 01:45 PM    PLATELET 129 78/84/9317 01:45 PM    MCV 67.4 (L) 05/04/2019 01:45 PM     Lab Results   Component Value Date/Time    Sodium 139 05/04/2019 01:45 PM    Potassium 3.6 05/04/2019 01:45 PM    Chloride 107 05/04/2019 01:45 PM    CO2 25 05/04/2019 01:45 PM    Anion gap 7 05/04/2019 01:45 PM    Glucose 81 05/04/2019 01:45 PM    BUN 7 05/04/2019 01:45 PM    Creatinine 0.72 05/04/2019 01:45 PM    BUN/Creatinine ratio 10 (L) 05/04/2019 01:45 PM    GFR est AA >60 05/04/2019 01:45 PM    GFR est non-AA >60 05/04/2019 01:45 PM    Calcium 8.7 05/04/2019 01:45 PM    Bilirubin, total 0.3 05/04/2019 01:45 PM    ALT (SGPT) 19 05/04/2019 01:45 PM    AST (SGOT) 15 05/04/2019 01:45 PM    Alk. phosphatase 71 05/04/2019 01:45 PM    Protein, total 7.9 05/04/2019 01:45 PM    Albumin 3.6 05/04/2019 01:45 PM    Globulin 4.3 (H) 05/04/2019 01:45 PM    A-G Ratio 0.8 05/04/2019 01:45 PM          Assessment/Plan:    1. Hypertension- BP elevated and has been on several occasions; start amlodipine 5mg daily; low sodium diet; return in 2 weeks for BP check    2. Fibroids- management per Dr. Jayne Cummins; pending preop clearance    3.  Anemia- more than likely related to menorrhagia; will continue to monitor    Lab review: labs reviewed, I note that hemogram abnormal      Today's Visit:   Orders Placed This Encounter    amLODIPine (NORVASC) 5 mg tablet     Sig: Take 1 Tab by mouth daily. Dispense:  30 Tab     Refill:  0         Health Maintenance:     I have discussed the diagnosis with the patient and the intended plan as seen in the above orders. The patient has received an after-visit summary and questions were answered concerning future plans. I have discussed medication side effects and warnings with the patient as well. I have reviewed the plan of care with the patient, accepted their input and they are in agreement with the treatment goals. Follow-up and Dispositions    · Return in about 2 weeks (around 5/23/2019) for Preop clearance, BP check med eval.       More than 1/2 of this 30 minute visit was spent in counseling and coordination of care, as described above.     MARIELA Day

## 2019-05-09 NOTE — TELEPHONE ENCOUNTER
Pt needs a 2 week follow up around 5/23/19  For a pre op clearance and BP check as well. Pt needs a call back. Please advise.

## 2019-05-21 ENCOUNTER — OFFICE VISIT (OUTPATIENT)
Dept: FAMILY MEDICINE CLINIC | Age: 49
End: 2019-05-21

## 2019-05-21 VITALS
DIASTOLIC BLOOD PRESSURE: 74 MMHG | OXYGEN SATURATION: 99 % | HEIGHT: 63 IN | RESPIRATION RATE: 20 BRPM | BODY MASS INDEX: 28.35 KG/M2 | HEART RATE: 87 BPM | TEMPERATURE: 98.2 F | WEIGHT: 160 LBS | SYSTOLIC BLOOD PRESSURE: 118 MMHG

## 2019-05-21 DIAGNOSIS — Z01.818 PRE-OPERATIVE CLEARANCE: Primary | ICD-10-CM

## 2019-05-21 DIAGNOSIS — Z23 ENCOUNTER FOR IMMUNIZATION: ICD-10-CM

## 2019-05-21 PROBLEM — D50.0 IRON DEFICIENCY ANEMIA DUE TO CHRONIC BLOOD LOSS: Status: ACTIVE | Noted: 2019-05-21

## 2019-05-21 PROBLEM — I10 ESSENTIAL HYPERTENSION: Status: ACTIVE | Noted: 2019-05-21

## 2019-05-21 PROBLEM — N92.1 MENOMETRORRHAGIA: Status: ACTIVE | Noted: 2019-05-21

## 2019-05-21 LAB — HBA1C MFR BLD HPLC: 5.4 %

## 2019-05-21 RX ORDER — AMLODIPINE BESYLATE 5 MG/1
5 TABLET ORAL DAILY
Qty: 30 TAB | Refills: 0 | Status: SHIPPED | OUTPATIENT
Start: 2019-05-21 | End: 2019-07-08 | Stop reason: SDUPTHER

## 2019-05-21 NOTE — PROGRESS NOTES
Preoperative Evaluation    Date of Exam: 2019      Min Stovall  Is a 50 y.o.  female  who presents for preoperative evaluation and management of     Chief Complaint   Patient presents with    Menstrual Problem    Hypertension       Cardiovascular Review:  The patient has hypertension. Diet and Lifestyle: generally follows a low fat low cholesterol diet  Home BP Monitoring: is not measured at home. Pertinent ROS: taking medications as instructed, no medication side effects noted, no TIA's, no chest pain on exertion, no dyspnea on exertion, no swelling of ankles. Anemia secondary to menometrorragia             Patient Active Problem List    Diagnosis Date Noted    Essential hypertension 2019    Menometrorrhagia 2019    Iron deficiency anemia due to chronic blood loss 2019     Current Outpatient Medications   Medication Sig Dispense Refill    amLODIPine (NORVASC) 5 mg tablet Take 1 Tab by mouth daily. 30 Tab 0    medroxyPROGESTERone (PROVERA) 10 mg tablet Take 1 Tab by mouth daily.  30 Tab 1     No Known Allergies  Past Medical History:   Diagnosis Date    Essential hypertension 2019    Iron deficiency anemia due to chronic blood loss 2019    Low iron      Past Surgical History:   Procedure Laterality Date    HX  SECTION       Family History   Problem Relation Age of Onset    Breast Cancer Mother     Breast Cancer Sister     No Known Problems Father      Social History     Tobacco Use    Smoking status: Never Smoker    Smokeless tobacco: Never Used   Substance Use Topics    Alcohol use: Yes     Frequency: Monthly or less     Drinks per session: 3 or 4     Binge frequency: Less than monthly       Procedure/Surgery:total hysterectomy, bilateral salpingoectomy, observational cystoscopy  Date of Procedure/Surgery: TBD  Surgeon: [facility] Floor: [place]   Primary Physician: Matthew Hebert DO  Latex Allergy: no    Problem List:     Patient Active Problem List    Diagnosis Date Noted    Essential hypertension 2019    Menometrorrhagia 2019    Iron deficiency anemia due to chronic blood loss 2019     Medical History:     Past Medical History:   Diagnosis Date    Essential hypertension 2019    Iron deficiency anemia due to chronic blood loss 2019    Low iron      Allergies:   No Known Allergies   Medications:     Current Outpatient Medications   Medication Sig    amLODIPine (NORVASC) 5 mg tablet Take 1 Tab by mouth daily.  medroxyPROGESTERone (PROVERA) 10 mg tablet Take 1 Tab by mouth daily. No current facility-administered medications for this visit. Surgical History:     Past Surgical History:   Procedure Laterality Date    HX  SECTION       Social History:     Social History     Socioeconomic History    Marital status: SINGLE     Spouse name: Not on file    Number of children: Not on file    Years of education: Not on file    Highest education level: Not on file   Tobacco Use    Smoking status: Never Smoker    Smokeless tobacco: Never Used   Substance and Sexual Activity    Alcohol use: Yes     Frequency: Monthly or less     Drinks per session: 3 or 4     Binge frequency: Less than monthly    Drug use: No    Sexual activity: Not Currently     Partners: Male     Birth control/protection: None       Recent use of: ASA and NSAIDs    Tetanus not up to date. I tried to order it and we have run out.      Anesthesia Complications: None  History of abnormal bleeding : None  History of Blood Transfusions: no  Health Care Directive or Living Will: no    REVIEW OF SYSTEMS:      General: negative for - chills, fatigue, fever, weight change  Psych: negative for - anxiety, depression, irritability or mood swings  ENT: negative for - headaches, hearing change, nasal congestion, oral lesions, sneezing or sore throat  Heme/ Lymph: negative for - bleeding problems, bruising, pallor or swollen lymph nodes  Endo: negative for - hot flashes, polydipsia/polyuria or temperature intolerance  Resp: negative for - cough, shortness of breath or wheezing  CV: negative for - chest pain, edema or palpitations  GI: negative for - abdominal pain, change in bowel habits, constipation, diarrhea or nausea/vomiting  : negative for - dysuria, hematuria, incontinence, pelvic pain or vulvar/vaginal symptoms  MSK: negative for - joint pain, joint swelling or muscle pain  Neuro: negative for - confusion, headaches, seizures or weakness  Derm: negative for - dry skin, hair changes, rash or skin lesion changes    EXAM:   Vitals:    05/21/19 0856   BP: 118/74   Pulse: 87   Resp: 20   Temp: 98.2 °F (36.8 °C)   TempSrc: Oral   SpO2: 99%   Weight: 160 lb (72.6 kg)   Height: 5' 3\" (1.6 m)   PainSc:   0 - No pain   LMP: 05/04/2019       alert, well appearing, and in no distress, oriented to person, place, and time and normal appearing weight  Mental status - alert, oriented to person, place, and time  Eyes - pupils equal and reactive, extraocular eye movements intact  Ears - bilateral TM's and external ear canals normal  Nose - normal and patent, no erythema, discharge or polyps  Mouth - mucous membranes moist, pharynx normal without lesions  Neck - supple, no significant adenopathy  Lymphatics - no palpable lymphadenopathy, no hepatosplenomegaly  Chest - clear to auscultation, no wheezes, rales or rhonchi, symmetric air entry  Heart - normal rate, regular rhythm, normal S1, S2, no murmurs, rubs, clicks or gallops  Abdomen - soft, nontender, nondistended, no masses or organomegaly  Back exam - full range of motion, no tenderness, palpable spasm or pain on motion  Neurological - alert, oriented, normal speech, no focal findings or movement disorder noted  Musculoskeletal - no joint tenderness, deformity or swelling  Extremities - peripheral pulses normal, no pedal edema, no clubbing or cyanosis  Skin - normal coloration and turgor, no rashes, no suspicious skin lesions noted      DIAGNOSTICS:   1. EKG: EKG FINDINGS - normal EKG, normal sinus rhythm, unchanged from previous tracings  2. CXR:   3. Labs:     Component      Latest Ref Rng & Units 5/4/2019           2:20 PM   WBC      4.6 - 13.2 K/uL    RBC      4.20 - 5.30 M/uL    HGB      12.0 - 16.0 g/dL    HCT      35.0 - 45.0 %    MCV      74.0 - 97.0 FL    MCH      24.0 - 34.0 PG    MCHC      31.0 - 37.0 g/dL    RDW      11.6 - 14.5 %    PLATELET      454 - 146 K/uL    MPV      9.2 - 11.8 FL    NEUTROPHILS      40 - 73 %    LYMPHOCYTES      21 - 52 %    MONOCYTES      3 - 10 %    EOSINOPHILS      0 - 5 %    BASOPHILS      0 - 2 %    ABS. NEUTROPHILS      1.8 - 8.0 K/UL    ABS. LYMPHOCYTES      0.9 - 3.6 K/UL    ABS. MONOCYTES      0.05 - 1.2 K/UL    ABS. EOSINOPHILS      0.0 - 0.4 K/UL    ABS. BASOPHILS      0.0 - 0.1 K/UL    DF          Sodium      136 - 145 mmol/L    Potassium      3.5 - 5.5 mmol/L    Chloride      100 - 108 mmol/L    CO2      21 - 32 mmol/L    Anion gap      3.0 - 18 mmol/L    Glucose      74 - 99 mg/dL    BUN      7.0 - 18 MG/DL    Creatinine      0.6 - 1.3 MG/DL    BUN/Creatinine ratio      12 - 20      GFR est AA      >60 ml/min/1.73m2    GFR est non-AA      >60 ml/min/1.73m2    Calcium      8.5 - 10.1 MG/DL    Bilirubin, total      0.2 - 1.0 MG/DL    ALT (SGPT)      13 - 56 U/L    AST      15 - 37 U/L    Alk.  phosphatase      45 - 117 U/L    Protein, total      6.4 - 8.2 g/dL    Albumin      3.4 - 5.0 g/dL    Globulin      2.0 - 4.0 g/dL    A-G Ratio      0.8 - 1.7      Color          Appearance          Specific gravity      1.005 - 1.030      pH (UA)      5.0 - 8.0      Protein      NEG mg/dL    Glucose      NEG mg/dL    Ketone      NEG mg/dL    Bilirubin      NEG      Blood      NEG      Urobilinogen      0.2 - 1.0 EU/dL    Nitrites      NEG      Leukocyte Esterase      NEG      Crossmatch Expiration       05/07/2019   ABO/Rh(D)       Kimberly Thacker POSITIVE   Antibody screen       NEG   Special Requests:          Wet prep            Component      Latest Ref Rng & Units 5/4/2019           2:20 PM   WBC      4.6 - 13.2 K/uL    RBC      4.20 - 5.30 M/uL    HGB      12.0 - 16.0 g/dL    HCT      35.0 - 45.0 %    MCV      74.0 - 97.0 FL    MCH      24.0 - 34.0 PG    MCHC      31.0 - 37.0 g/dL    RDW      11.6 - 14.5 %    PLATELET      816 - 099 K/uL    MPV      9.2 - 11.8 FL    NEUTROPHILS      40 - 73 %    LYMPHOCYTES      21 - 52 %    MONOCYTES      3 - 10 %    EOSINOPHILS      0 - 5 %    BASOPHILS      0 - 2 %    ABS. NEUTROPHILS      1.8 - 8.0 K/UL    ABS. LYMPHOCYTES      0.9 - 3.6 K/UL    ABS. MONOCYTES      0.05 - 1.2 K/UL    ABS. EOSINOPHILS      0.0 - 0.4 K/UL    ABS. BASOPHILS      0.0 - 0.1 K/UL    DF          Sodium      136 - 145 mmol/L    Potassium      3.5 - 5.5 mmol/L    Chloride      100 - 108 mmol/L    CO2      21 - 32 mmol/L    Anion gap      3.0 - 18 mmol/L    Glucose      74 - 99 mg/dL    BUN      7.0 - 18 MG/DL    Creatinine      0.6 - 1.3 MG/DL    BUN/Creatinine ratio      12 - 20      GFR est AA      >60 ml/min/1.73m2    GFR est non-AA      >60 ml/min/1.73m2    Calcium      8.5 - 10.1 MG/DL    Bilirubin, total      0.2 - 1.0 MG/DL    ALT (SGPT)      13 - 56 U/L    AST      15 - 37 U/L    Alk.  phosphatase      45 - 117 U/L    Protein, total      6.4 - 8.2 g/dL    Albumin      3.4 - 5.0 g/dL    Globulin      2.0 - 4.0 g/dL    A-G Ratio      0.8 - 1.7      Color          Appearance          Specific gravity      1.005 - 1.030      pH (UA)      5.0 - 8.0      Protein      NEG mg/dL    Glucose      NEG mg/dL    Ketone      NEG mg/dL    Bilirubin      NEG      Blood      NEG      Urobilinogen      0.2 - 1.0 EU/dL    Nitrites      NEG      Leukocyte Esterase      NEG      Crossmatch Expiration          ABO/Rh(D)          Antibody screen          Special Requests:       NO SPECIAL REQUESTS   Wet prep       NO YEAST,TRICHOMONAS OR CLUE CELLS NOTED Component      Latest Ref Rng & Units 5/4/2019 5/4/2019 5/4/2019           2:16 PM  1:45 PM  1:45 PM   WBC      4.6 - 13.2 K/uL   6.3   RBC      4.20 - 5.30 M/uL   4.38   HGB      12.0 - 16.0 g/dL   9.1 (L)   HCT      35.0 - 45.0 %   29.5 (L)   MCV      74.0 - 97.0 FL   67.4 (L)   MCH      24.0 - 34.0 PG   20.8 (L)   MCHC      31.0 - 37.0 g/dL   30.8 (L)   RDW      11.6 - 14.5 %   18.3 (H)   PLATELET      913 - 282 K/uL   387   MPV      9.2 - 11.8 FL   10.2   NEUTROPHILS      40 - 73 %   61   LYMPHOCYTES      21 - 52 %   27   MONOCYTES      3 - 10 %   7   EOSINOPHILS      0 - 5 %   3   BASOPHILS      0 - 2 %   2   ABS. NEUTROPHILS      1.8 - 8.0 K/UL   3.8   ABS. LYMPHOCYTES      0.9 - 3.6 K/UL   1.7   ABS. MONOCYTES      0.05 - 1.2 K/UL   0.5   ABS. EOSINOPHILS      0.0 - 0.4 K/UL   0.2   ABS. BASOPHILS      0.0 - 0.1 K/UL   0.1   DF         AUTOMATED   Sodium      136 - 145 mmol/L  139    Potassium      3.5 - 5.5 mmol/L  3.6    Chloride      100 - 108 mmol/L  107    CO2      21 - 32 mmol/L  25    Anion gap      3.0 - 18 mmol/L  7    Glucose      74 - 99 mg/dL  81    BUN      7.0 - 18 MG/DL  7    Creatinine      0.6 - 1.3 MG/DL  0.72    BUN/Creatinine ratio      12 - 20    10 (L)    GFR est AA      >60 ml/min/1.73m2  >60    GFR est non-AA      >60 ml/min/1.73m2  >60    Calcium      8.5 - 10.1 MG/DL  8.7    Bilirubin, total      0.2 - 1.0 MG/DL  0.3    ALT (SGPT)      13 - 56 U/L  19    AST      15 - 37 U/L  15    Alk.  phosphatase      45 - 117 U/L  71    Protein, total      6.4 - 8.2 g/dL  7.9    Albumin      3.4 - 5.0 g/dL  3.6    Globulin      2.0 - 4.0 g/dL  4.3 (H)    A-G Ratio      0.8 - 1.7    0.8    Color       YELLOW     Appearance       CLEAR     Specific gravity      1.005 - 1.030   1.009     pH (UA)      5.0 - 8.0   6.5     Protein      NEG mg/dL NEGATIVE     Glucose      NEG mg/dL NEGATIVE     Ketone      NEG mg/dL NEGATIVE     Bilirubin      NEG   NEGATIVE     Blood      NEG   LARGE (A) Urobilinogen      0.2 - 1.0 EU/dL 1.0     Nitrites      NEG   NEGATIVE     Leukocyte Esterase      NEG   TRACE (A)     Crossmatch Expiration            ABO/Rh(D)            Antibody screen            Special Requests:            Wet prep              IMPRESSION:   Hypertension - stable  Anemia ongoing needs to get back on iron and MVI  Needs a tdap before surgery           No contraindications to planned surgery    [id number] [facility],

## 2019-05-21 NOTE — TELEPHONE ENCOUNTER
Ruthymike Kansas came in today and got her Surgery clearance form , however she states she has only 6 pills left and would like a refill sent to her pharmacy to make sure she doesn't start bleeding again . Please advise. Thank you .

## 2019-05-21 NOTE — PROGRESS NOTES
Room #  5    SUBJECTIVE:    Rocky Peterson is a 50 y.o. female who presents today for pre-op clearance    1. Have you been to the ER, urgent care clinic since your last visit? Hospitalized since your last visit? NO    2. Have you seen or consulted any other health care providers outside of the [address] [place] [address] since your last visit? Include any pap smears or colon screening. NO  When :  Reason:    Health Maintenance reviewed Yes    Health Maintenance Due   Topic Date Due    DTaP/Tdap/Td series (1 - Tdap) 07/13/1991

## 2019-05-22 RX ORDER — MEDROXYPROGESTERONE ACETATE 10 MG/1
10 TABLET ORAL DAILY
Qty: 30 TAB | Refills: 1 | Status: SHIPPED | OUTPATIENT
Start: 2019-05-22 | End: 2019-06-07

## 2019-05-28 ENCOUNTER — TELEPHONE (OUTPATIENT)
Dept: OBGYN CLINIC | Age: 49
End: 2019-05-28

## 2019-05-28 ENCOUNTER — OFFICE VISIT (OUTPATIENT)
Dept: FAMILY MEDICINE CLINIC | Age: 49
End: 2019-05-28

## 2019-05-28 VITALS
RESPIRATION RATE: 19 BRPM | OXYGEN SATURATION: 100 % | DIASTOLIC BLOOD PRESSURE: 76 MMHG | HEIGHT: 63 IN | BODY MASS INDEX: 28.84 KG/M2 | HEART RATE: 79 BPM | SYSTOLIC BLOOD PRESSURE: 115 MMHG | TEMPERATURE: 98.2 F | WEIGHT: 162.8 LBS

## 2019-05-28 DIAGNOSIS — Z23 ENCOUNTER FOR IMMUNIZATION: Primary | ICD-10-CM

## 2019-05-28 NOTE — PROGRESS NOTES
Room #      SUBJECTIVE:    Sridhar Burnett is a 50 y.o. female who presents today for follow up for pre-op clearance for TDAP shot     1. Have you been to the ER, urgent care clinic since your last visit? Hospitalized since your last visit? NO    2. Have you seen or consulted any other health care providers outside of the [address] [place] [address] since your last visit? Include any pap smears or colon screening. NO  When :  Reason:    Health Maintenance reviewed Yes    Health Maintenance Due   Topic Date Due    DTaP/Tdap/Td series (1 - Tdap) 07/13/1991

## 2019-05-28 NOTE — TELEPHONE ENCOUNTER
Pt called re: heavy bleeding with clots size of a quarter on 5-. Pt inquiring about surgery date, ( clearance in the system on 5- Dr. Elizabeth Alvarado) . Pt informed to call Marlane Hashimoto on 5- re: surgery date per Dr. Symone Gonzales. Pt verbal understanding.

## 2019-05-31 ENCOUNTER — HOSPITAL ENCOUNTER (OUTPATIENT)
Dept: PREADMISSION TESTING | Age: 49
Discharge: HOME | End: 2019-05-31
Payer: MEDICAID

## 2019-05-31 ENCOUNTER — OFFICE VISIT (OUTPATIENT)
Dept: OBGYN CLINIC | Age: 49
End: 2019-05-31

## 2019-05-31 VITALS
DIASTOLIC BLOOD PRESSURE: 81 MMHG | HEART RATE: 56 BPM | TEMPERATURE: 98.2 F | WEIGHT: 162 LBS | BODY MASS INDEX: 28.7 KG/M2 | SYSTOLIC BLOOD PRESSURE: 118 MMHG | RESPIRATION RATE: 18 BRPM | HEIGHT: 63 IN

## 2019-05-31 DIAGNOSIS — Z01.818 PRE-OP EVALUATION: ICD-10-CM

## 2019-05-31 DIAGNOSIS — Z01.818 PRE-OP EVALUATION: Primary | ICD-10-CM

## 2019-05-31 LAB
ABO + RH BLD: NORMAL
BASOPHILS # BLD: 0.1 K/UL (ref 0–0.1)
BASOPHILS NFR BLD: 1 % (ref 0–2)
BLOOD GROUP ANTIBODIES SERPL: NORMAL
DIFFERENTIAL METHOD BLD: ABNORMAL
EOSINOPHIL # BLD: 0.2 K/UL (ref 0–0.4)
EOSINOPHIL NFR BLD: 3 % (ref 0–5)
ERYTHROCYTE [DISTWIDTH] IN BLOOD BY AUTOMATED COUNT: 18.3 % (ref 11.6–14.5)
HCG SERPL-ACNC: <1 MIU/ML (ref 0–10)
HCT VFR BLD AUTO: 29.1 % (ref 35–45)
HGB BLD-MCNC: 8.6 G/DL (ref 12–16)
LYMPHOCYTES # BLD: 1.7 K/UL (ref 0.9–3.6)
LYMPHOCYTES NFR BLD: 26 % (ref 21–52)
MCH RBC QN AUTO: 20 PG (ref 24–34)
MCHC RBC AUTO-ENTMCNC: 29.6 G/DL (ref 31–37)
MCV RBC AUTO: 67.7 FL (ref 74–97)
MONOCYTES # BLD: 0.6 K/UL (ref 0.05–1.2)
MONOCYTES NFR BLD: 9 % (ref 3–10)
NEUTS SEG # BLD: 4.1 K/UL (ref 1.8–8)
NEUTS SEG NFR BLD: 61 % (ref 40–73)
PLATELET # BLD AUTO: 366 K/UL (ref 135–420)
PMV BLD AUTO: 10.5 FL (ref 9.2–11.8)
RBC # BLD AUTO: 4.3 M/UL (ref 4.2–5.3)
SPECIMEN EXP DATE BLD: NORMAL
WBC # BLD AUTO: 6.6 K/UL (ref 4.6–13.2)

## 2019-05-31 PROCEDURE — 36415 COLL VENOUS BLD VENIPUNCTURE: CPT

## 2019-05-31 PROCEDURE — 86900 BLOOD TYPING SEROLOGIC ABO: CPT

## 2019-05-31 PROCEDURE — 85025 COMPLETE CBC W/AUTO DIFF WBC: CPT

## 2019-05-31 PROCEDURE — 84702 CHORIONIC GONADOTROPIN TEST: CPT

## 2019-05-31 NOTE — PERIOP NOTES
PAT - SURGICAL PRE-ADMISSION INSTRUCTIONS    NAME:  Preet Torres                                                          TODAY'S DATE:  5/31/2019    SURGERY DATE:  6/5/2019                                  SURGERY ARRIVAL TIME:   TBV    1. Do NOT eat or drink anything, including candy or gum, after MIDNIGHT on 06/04/2019 , unless you have specific instructions from your Surgeon or Anesthesia Provider to do so. 2. No smoking on the day of surgery. 3. No alcohol 24 hours prior to the day of surgery. 4. No recreational drugs for one week prior to the day of surgery. 5. Leave all valuables, including money/purse, at home. 6. Remove all jewelry, nail polish, makeup (including mascara); no lotions, powders, deodorant, or perfume/cologne/after shave. 7. Glasses/Contact lenses and Dentures may be worn to the hospital.  They will be removed prior to surgery. 8. Call your doctor if symptoms of a cold or illness develop within 24 ours prior to surgery. 9. AN ADULT MUST DRIVE YOU HOME AFTER OUTPATIENT SURGERY. 10. If you are having an OUTPATIENT procedure, please make arrangements for a responsible adult to be with you for 24 hours after your surgery. 11. If you are admitted to the hospital, you will be assigned to a bed after surgery is complete. Normally a family member will not be able to see you until you are in your assigned bed. 15. Family is encouraged to accompany you to the hospital.  We ask visitors in the treatment area to be limited to ONE person at a time to ensure patient privacy. EXCEPTIONS WILL BE MADE AS NEEDED. 15. Children under 12 are discouraged from entering the treatment area and need to be supervised by an adult when in the waiting room. Special Instructions:     Take these medications the morning of surgery with a sip of water:  Amlodipine, STOP anticoagulants AT LEAST 1 WEEK PRIOR to your surgery or, follow other MD instructions:  No NSAIDS    Patient Prep:    use CHG solution    These surgical instructions were reviewed with Laina Osuna during the PAT visit. A printed copy of the instructions was provided to Laina Osuna. Directions: On the morning of surgery, please go to the [address] [facility]. Enter the building from the [facility] entrance, 1st floor (next to the Emergency Room entrance). Take the elevator to the 2nd floor. Sign in at the Registration Desk.     If you have any questions and/or concerns, please do not hesitate to call:  (Prior to the day of surgery)  [facility] unit:  [phone]  (Day of surgery)  [facility] unit:  [phone]

## 2019-06-03 NOTE — PROGRESS NOTES
Preoperative Evaluation    Date of Exam: 2019    Preet Torres is a 50 y.o. female (:1970) who presents for preoperative evaluation. Procedure/Surgery: Alison Bowles assisted total laparoscopic hysterectomy, bilateral salpingectomy, observational cystoscopy, possible open hysterectomy. Date of Procedure/Surgery: 2019  Surgeon: JAQUAN Macedo,   [address]: [facility]  Primary Physician: Blanquita Pratt NP  Latex Allergy: no    Problem List:     Patient Active Problem List    Diagnosis Date Noted    Essential hypertension 2019    Menometrorrhagia 2019    Iron deficiency anemia due to chronic blood loss 2019     Medical History:     Past Medical History:   Diagnosis Date    Essential hypertension 2019    Iron deficiency anemia due to chronic blood loss 2019    Low iron      Allergies:   No Known Allergies   Medications:     Current Outpatient Medications   Medication Sig    medroxyPROGESTERone (PROVERA) 10 mg tablet Take 1 Tab by mouth daily.  amLODIPine (NORVASC) 5 mg tablet Take 1 Tab by mouth daily. No current facility-administered medications for this visit.       Surgical History:     Past Surgical History:   Procedure Laterality Date    HX  SECTION       Social History:     Social History     Socioeconomic History    Marital status: SINGLE     Spouse name: Not on file    Number of children: Not on file    Years of education: Not on file    Highest education level: Not on file   Tobacco Use    Smoking status: Never Smoker    Smokeless tobacco: Never Used   Substance and Sexual Activity    Alcohol use: Yes     Frequency: Monthly or less     Drinks per session: 3 or 4     Binge frequency: Less than monthly     Comment: occasional    Drug use: No    Sexual activity: Not Currently     Partners: Male     Birth control/protection: None       Recent use of: No recent use of aspirin (ASA), NSAIDS or steroids    Tetanus up to date: last tetanus booster within 10 years      Anesthesia Complications: None  History of abnormal bleeding : None  History of Blood Transfusions: no  Health Care Directive or Living Will: no    REVIEW OF SYSTEMS:  A comprehensive review of systems was negative except for that written in the HPI. EXAM:   Visit Vitals  /81   Pulse (!) 56   Temp 98.2 °F (36.8 °C) (Oral)   Resp 18   Ht 5' 3\" (1.6 m)   Wt 162 lb (73.5 kg)   LMP 05/04/2019   BMI 28.70 kg/m²     General appearance - alert, well appearing, and in no distress and oriented to person, place, and time  Mental status - alert, oriented to person, place, and time  Chest - clear to auscultation, no wheezes, rales or rhonchi, symmetric air entry  Heart - normal rate, regular rhythm, normal S1, S2, no murmurs, rubs, clicks or gallops  Abdomen - soft, nontender, nondistended, no masses or organomegaly  Pelvic - deferred  Extremities - peripheral pulses normal, no pedal edema, no clubbing or cyanosis      DIAGNOSTICS:   1. EKG: EKG FINDINGS - Normal sinus rhythm; occasional ectopic beat  2. CXR: N/A  3. Labs:   Lab Results   Component Value Date/Time    WBC 6.6 05/31/2019 11:48 AM    HGB 8.6 (L) 05/31/2019 11:48 AM    HCT 29.1 (L) 05/31/2019 11:48 AM    PLATELET 456 36/11/2779 11:48 AM    MCV 67.7 (L) 05/31/2019 11:48 AM     Lab Results   Component Value Date/Time    Sodium 139 05/04/2019 01:45 PM    Potassium 3.6 05/04/2019 01:45 PM    Chloride 107 05/04/2019 01:45 PM    CO2 25 05/04/2019 01:45 PM    Anion gap 7 05/04/2019 01:45 PM    Glucose 81 05/04/2019 01:45 PM    BUN 7 05/04/2019 01:45 PM    Creatinine 0.72 05/04/2019 01:45 PM    BUN/Creatinine ratio 10 (L) 05/04/2019 01:45 PM    GFR est AA >60 05/04/2019 01:45 PM    GFR est non-AA >60 05/04/2019 01:45 PM    Calcium 8.7 05/04/2019 01:45 PM    Bilirubin, total 0.3 05/04/2019 01:45 PM    ALT (SGPT) 19 05/04/2019 01:45 PM    AST (SGOT) 15 05/04/2019 01:45 PM    Alk.  phosphatase 71 05/04/2019 01:45 PM    Protein, total 7.9 05/04/2019 01:45 PM    Albumin 3.6 05/04/2019 01:45 PM    Globulin 4.3 (H) 05/04/2019 01:45 PM    A-G Ratio 0.8 05/04/2019 01:45 PM        IMPRESSION:   Symptomatic fibroids with anemia. Desires definitive management with surgery. Informed consent obtained for hysterectomy. Routes reviewed in detail with possibility of open procedure. Risks of bleeding, infection, need for transfusion discussed in detail. Pre and post-operative instructions reviewed in detail. No contraindications to planned surgery. Pt has been cleared by PCP.     Dexter Perdomo MD   5/31/2019

## 2019-06-03 NOTE — H&P (VIEW-ONLY)
Preoperative Evaluation    Date of Exam: 2019    Meenakshi Meeks is a 50 y.o. female (:1970) who presents for preoperative evaluation. Procedure/Surgery: Trinity Arreola assisted total laparoscopic hysterectomy, bilateral salpingectomy, observational cystoscopy, possible open hysterectomy. Date of Procedure/Surgery: 2019  Surgeon: A. Severa Ni, [address]: [facility]  Primary Physician: Angelina Metz NP  Latex Allergy: no    Problem List:     Patient Active Problem List    Diagnosis Date Noted    Essential hypertension 2019    Menometrorrhagia 2019    Iron deficiency anemia due to chronic blood loss 2019     Medical History:     Past Medical History:   Diagnosis Date    Essential hypertension 2019    Iron deficiency anemia due to chronic blood loss 2019    Low iron      Allergies:   No Known Allergies   Medications:     Current Outpatient Medications   Medication Sig    medroxyPROGESTERone (PROVERA) 10 mg tablet Take 1 Tab by mouth daily.  amLODIPine (NORVASC) 5 mg tablet Take 1 Tab by mouth daily. No current facility-administered medications for this visit.       Surgical History:     Past Surgical History:   Procedure Laterality Date    HX  SECTION       Social History:     Social History     Socioeconomic History    Marital status: SINGLE     Spouse name: Not on file    Number of children: Not on file    Years of education: Not on file    Highest education level: Not on file   Tobacco Use    Smoking status: Never Smoker    Smokeless tobacco: Never Used   Substance and Sexual Activity    Alcohol use: Yes     Frequency: Monthly or less     Drinks per session: 3 or 4     Binge frequency: Less than monthly     Comment: occasional    Drug use: No    Sexual activity: Not Currently     Partners: Male     Birth control/protection: None       Recent use of: No recent use of aspirin (ASA), NSAIDS or steroids    Tetanus up to date: last tetanus booster within 10 years      Anesthesia Complications: None  History of abnormal bleeding : None  History of Blood Transfusions: no  Health Care Directive or Living Will: no    REVIEW OF SYSTEMS:  A comprehensive review of systems was negative except for that written in the HPI. EXAM:   Visit Vitals  /81   Pulse (!) 56   Temp 98.2 °F (36.8 °C) (Oral)   Resp 18   Ht 5' 3\" (1.6 m)   Wt 162 lb (73.5 kg)   LMP 05/04/2019   BMI 28.70 kg/m²     General appearance - alert, well appearing, and in no distress and oriented to person, place, and time  Mental status - alert, oriented to person, place, and time  Chest - clear to auscultation, no wheezes, rales or rhonchi, symmetric air entry  Heart - normal rate, regular rhythm, normal S1, S2, no murmurs, rubs, clicks or gallops  Abdomen - soft, nontender, nondistended, no masses or organomegaly  Pelvic - deferred  Extremities - peripheral pulses normal, no pedal edema, no clubbing or cyanosis      DIAGNOSTICS:   1. EKG: EKG FINDINGS - Normal sinus rhythm; occasional ectopic beat  2. CXR: N/A  3. Labs:   Lab Results   Component Value Date/Time    WBC 6.6 05/31/2019 11:48 AM    HGB 8.6 (L) 05/31/2019 11:48 AM    HCT 29.1 (L) 05/31/2019 11:48 AM    PLATELET 162 27/83/4620 11:48 AM    MCV 67.7 (L) 05/31/2019 11:48 AM     Lab Results   Component Value Date/Time    Sodium 139 05/04/2019 01:45 PM    Potassium 3.6 05/04/2019 01:45 PM    Chloride 107 05/04/2019 01:45 PM    CO2 25 05/04/2019 01:45 PM    Anion gap 7 05/04/2019 01:45 PM    Glucose 81 05/04/2019 01:45 PM    BUN 7 05/04/2019 01:45 PM    Creatinine 0.72 05/04/2019 01:45 PM    BUN/Creatinine ratio 10 (L) 05/04/2019 01:45 PM    GFR est AA >60 05/04/2019 01:45 PM    GFR est non-AA >60 05/04/2019 01:45 PM    Calcium 8.7 05/04/2019 01:45 PM    Bilirubin, total 0.3 05/04/2019 01:45 PM    ALT (SGPT) 19 05/04/2019 01:45 PM    AST (SGOT) 15 05/04/2019 01:45 PM    Alk.  phosphatase 71 05/04/2019 01:45 PM    Protein, total 7.9 05/04/2019 01:45 PM    Albumin 3.6 05/04/2019 01:45 PM    Globulin 4.3 (H) 05/04/2019 01:45 PM    A-G Ratio 0.8 05/04/2019 01:45 PM        IMPRESSION:   Symptomatic fibroids with anemia. Desires definitive management with surgery. Informed consent obtained for hysterectomy. Routes reviewed in detail with possibility of open procedure. Risks of bleeding, infection, need for transfusion discussed in detail. Pre and post-operative instructions reviewed in detail. No contraindications to planned surgery. Pt has been cleared by PCP.     Aston Dixon MD   5/31/2019

## 2019-06-04 ENCOUNTER — ANESTHESIA EVENT (OUTPATIENT)
Dept: SURGERY | Age: 49
End: 2019-06-04
Payer: MEDICAID

## 2019-06-05 ENCOUNTER — ANESTHESIA (OUTPATIENT)
Dept: SURGERY | Age: 49
End: 2019-06-05
Payer: MEDICAID

## 2019-06-05 ENCOUNTER — HOSPITAL ENCOUNTER (OUTPATIENT)
Age: 49
Discharge: HOME | End: 2019-06-07
Attending: OBSTETRICS & GYNECOLOGY | Admitting: OBSTETRICS & GYNECOLOGY
Payer: MEDICAID

## 2019-06-05 DIAGNOSIS — Z90.710 S/P HYSTERECTOMY: Primary | ICD-10-CM

## 2019-06-05 DIAGNOSIS — S84.91XA NEURAPRAXIA OF RIGHT LOWER EXTREMITY, INITIAL ENCOUNTER: ICD-10-CM

## 2019-06-05 LAB — HCG UR QL: NEGATIVE

## 2019-06-05 PROCEDURE — 74011000250 HC RX REV CODE- 250: Performed by: OBSTETRICS & GYNECOLOGY

## 2019-06-05 PROCEDURE — 77030035048 HC TRCR ENDOSC OPTCL COVD -B: Performed by: OBSTETRICS & GYNECOLOGY

## 2019-06-05 PROCEDURE — 74011250637 HC RX REV CODE- 250/637: Performed by: NURSE ANESTHETIST, CERTIFIED REGISTERED

## 2019-06-05 PROCEDURE — 81025 URINE PREGNANCY TEST: CPT

## 2019-06-05 PROCEDURE — 74011250637 HC RX REV CODE- 250/637: Performed by: OBSTETRICS & GYNECOLOGY

## 2019-06-05 PROCEDURE — 74011250636 HC RX REV CODE- 250/636: Performed by: NURSE ANESTHETIST, CERTIFIED REGISTERED

## 2019-06-05 PROCEDURE — 77030035044 HC TRCR ENDOSC VRSPRT BLDLSS COVD -C: Performed by: OBSTETRICS & GYNECOLOGY

## 2019-06-05 PROCEDURE — 77030002986 HC SUT PROL J&J -A: Performed by: OBSTETRICS & GYNECOLOGY

## 2019-06-05 PROCEDURE — 77030010507 HC ADH SKN DERMBND J&J -B: Performed by: OBSTETRICS & GYNECOLOGY

## 2019-06-05 PROCEDURE — 77030008603 HC TRCR ENDOSC EPATH J&J -C: Performed by: OBSTETRICS & GYNECOLOGY

## 2019-06-05 PROCEDURE — 77030013079 HC BLNKT BAIR HGGR 3M -A: Performed by: NURSE ANESTHETIST, CERTIFIED REGISTERED

## 2019-06-05 PROCEDURE — 77030010513 HC APPL CLP LIG J&J -C: Performed by: OBSTETRICS & GYNECOLOGY

## 2019-06-05 PROCEDURE — 77030018842 HC SOL IRR SOD CL 9% BAXT -A: Performed by: OBSTETRICS & GYNECOLOGY

## 2019-06-05 PROCEDURE — 77030008683 HC TU ET CUF COVD -A: Performed by: NURSE ANESTHETIST, CERTIFIED REGISTERED

## 2019-06-05 PROCEDURE — 76060000040 HC ANESTHESIA 4.5 TO 5 HR: Performed by: OBSTETRICS & GYNECOLOGY

## 2019-06-05 PROCEDURE — 77030027491 HC SHR ENDOSC COVD -B: Performed by: OBSTETRICS & GYNECOLOGY

## 2019-06-05 PROCEDURE — C1727 CATH, BAL TIS DIS, NON-VAS: HCPCS | Performed by: OBSTETRICS & GYNECOLOGY

## 2019-06-05 PROCEDURE — 77030029357 HC DEV CLSR FAC SYS EFX TELE -C: Performed by: OBSTETRICS & GYNECOLOGY

## 2019-06-05 PROCEDURE — 77030032490 HC SLV COMPR SCD KNE COVD -B: Performed by: OBSTETRICS & GYNECOLOGY

## 2019-06-05 PROCEDURE — 77030022704 HC SUT VLOC COVD -B: Performed by: OBSTETRICS & GYNECOLOGY

## 2019-06-05 PROCEDURE — 77030008518 HC TBNG INSUF ENDO STRY -B: Performed by: OBSTETRICS & GYNECOLOGY

## 2019-06-05 PROCEDURE — 74011250636 HC RX REV CODE- 250/636: Performed by: OBSTETRICS & GYNECOLOGY

## 2019-06-05 PROCEDURE — 77030035277 HC OBTRTR BLDELSS DISP INTU -B: Performed by: OBSTETRICS & GYNECOLOGY

## 2019-06-05 PROCEDURE — 76210000016 HC OR PH I REC 1 TO 1.5 HR: Performed by: OBSTETRICS & GYNECOLOGY

## 2019-06-05 PROCEDURE — 77030019908 HC STETH ESOPH SIMS -A: Performed by: NURSE ANESTHETIST, CERTIFIED REGISTERED

## 2019-06-05 PROCEDURE — 77030018778 HC MANIP UTER VCAR CNMD -B: Performed by: OBSTETRICS & GYNECOLOGY

## 2019-06-05 PROCEDURE — C1765 ADHESION BARRIER: HCPCS | Performed by: OBSTETRICS & GYNECOLOGY

## 2019-06-05 PROCEDURE — 74011250636 HC RX REV CODE- 250/636

## 2019-06-05 PROCEDURE — 77030008477 HC STYL SATN SLP COVD -A: Performed by: NURSE ANESTHETIST, CERTIFIED REGISTERED

## 2019-06-05 PROCEDURE — 77030034850: Performed by: OBSTETRICS & GYNECOLOGY

## 2019-06-05 PROCEDURE — 77030035045 HC TRCR ENDOSC VRSPRT BLDLSS COVD -B: Performed by: OBSTETRICS & GYNECOLOGY

## 2019-06-05 PROCEDURE — 77030031139 HC SUT VCRL2 J&J -A: Performed by: OBSTETRICS & GYNECOLOGY

## 2019-06-05 PROCEDURE — 77030016151 HC PROTCTR LNS DFOG COVD -B: Performed by: OBSTETRICS & GYNECOLOGY

## 2019-06-05 PROCEDURE — 76010000881 HC OR TIME 4.5 TO 5HR INTENSV - TIER 2: Performed by: OBSTETRICS & GYNECOLOGY

## 2019-06-05 PROCEDURE — 88307 TISSUE EXAM BY PATHOLOGIST: CPT

## 2019-06-05 PROCEDURE — 77030008771 HC TU NG SALEM SUMP -A: Performed by: NURSE ANESTHETIST, CERTIFIED REGISTERED

## 2019-06-05 PROCEDURE — 77030027138 HC INCENT SPIROMETER -A

## 2019-06-05 PROCEDURE — 77030002933 HC SUT MCRYL J&J -A: Performed by: OBSTETRICS & GYNECOLOGY

## 2019-06-05 PROCEDURE — 74011000250 HC RX REV CODE- 250

## 2019-06-05 RX ORDER — SODIUM CHLORIDE 0.9 % (FLUSH) 0.9 %
5-40 SYRINGE (ML) INJECTION AS NEEDED
Status: DISCONTINUED | OUTPATIENT
Start: 2019-06-05 | End: 2019-06-07 | Stop reason: HOSPADM

## 2019-06-05 RX ORDER — METHYLENE BLUE 10 MG/ML
INJECTION INTRAVENOUS AS NEEDED
Status: DISCONTINUED | OUTPATIENT
Start: 2019-06-05 | End: 2019-06-05 | Stop reason: HOSPADM

## 2019-06-05 RX ORDER — HYDROMORPHONE HYDROCHLORIDE 1 MG/ML
1 INJECTION, SOLUTION INTRAMUSCULAR; INTRAVENOUS; SUBCUTANEOUS
Status: DISCONTINUED | OUTPATIENT
Start: 2019-06-05 | End: 2019-06-07 | Stop reason: HOSPADM

## 2019-06-05 RX ORDER — FAMOTIDINE 20 MG/1
20 TABLET, FILM COATED ORAL ONCE
Status: COMPLETED | OUTPATIENT
Start: 2019-06-05 | End: 2019-06-05

## 2019-06-05 RX ORDER — HYDROMORPHONE HYDROCHLORIDE 2 MG/ML
0.2 INJECTION, SOLUTION INTRAMUSCULAR; INTRAVENOUS; SUBCUTANEOUS AS NEEDED
Status: DISCONTINUED | OUTPATIENT
Start: 2019-06-05 | End: 2019-06-05 | Stop reason: HOSPADM

## 2019-06-05 RX ORDER — DIPHENHYDRAMINE HYDROCHLORIDE 50 MG/ML
12.5 INJECTION, SOLUTION INTRAMUSCULAR; INTRAVENOUS
Status: DISCONTINUED | OUTPATIENT
Start: 2019-06-05 | End: 2019-06-05 | Stop reason: HOSPADM

## 2019-06-05 RX ORDER — ROCURONIUM BROMIDE 10 MG/ML
INJECTION, SOLUTION INTRAVENOUS AS NEEDED
Status: DISCONTINUED | OUTPATIENT
Start: 2019-06-05 | End: 2019-06-05 | Stop reason: HOSPADM

## 2019-06-05 RX ORDER — CEFAZOLIN SODIUM 2 G/50ML
SOLUTION INTRAVENOUS
Status: COMPLETED
Start: 2019-06-05 | End: 2019-06-05

## 2019-06-05 RX ORDER — NEOSTIGMINE METHYLSULFATE 5 MG/5 ML
SYRINGE (ML) INTRAVENOUS AS NEEDED
Status: DISCONTINUED | OUTPATIENT
Start: 2019-06-05 | End: 2019-06-05 | Stop reason: HOSPADM

## 2019-06-05 RX ORDER — DIPHENHYDRAMINE HYDROCHLORIDE 50 MG/ML
12.5 INJECTION, SOLUTION INTRAMUSCULAR; INTRAVENOUS
Status: DISCONTINUED | OUTPATIENT
Start: 2019-06-05 | End: 2019-06-05 | Stop reason: SDUPTHER

## 2019-06-05 RX ORDER — FENTANYL CITRATE 50 UG/ML
INJECTION, SOLUTION INTRAMUSCULAR; INTRAVENOUS AS NEEDED
Status: DISCONTINUED | OUTPATIENT
Start: 2019-06-05 | End: 2019-06-05 | Stop reason: HOSPADM

## 2019-06-05 RX ORDER — HALOPERIDOL 5 MG/ML
1 INJECTION INTRAMUSCULAR
Status: DISCONTINUED | OUTPATIENT
Start: 2019-06-05 | End: 2019-06-05 | Stop reason: HOSPADM

## 2019-06-05 RX ORDER — SODIUM CHLORIDE 0.9 % (FLUSH) 0.9 %
5-40 SYRINGE (ML) INJECTION EVERY 8 HOURS
Status: DISCONTINUED | OUTPATIENT
Start: 2019-06-05 | End: 2019-06-07 | Stop reason: HOSPADM

## 2019-06-05 RX ORDER — HYDROMORPHONE HYDROCHLORIDE 1 MG/ML
1 INJECTION, SOLUTION INTRAMUSCULAR; INTRAVENOUS; SUBCUTANEOUS
Status: DISCONTINUED | OUTPATIENT
Start: 2019-06-05 | End: 2019-06-05

## 2019-06-05 RX ORDER — OXYCODONE AND ACETAMINOPHEN 5; 325 MG/1; MG/1
1 TABLET ORAL
Status: DISCONTINUED | OUTPATIENT
Start: 2019-06-05 | End: 2019-06-07 | Stop reason: HOSPADM

## 2019-06-05 RX ORDER — DEXAMETHASONE SODIUM PHOSPHATE 4 MG/ML
INJECTION, SOLUTION INTRA-ARTICULAR; INTRALESIONAL; INTRAMUSCULAR; INTRAVENOUS; SOFT TISSUE AS NEEDED
Status: DISCONTINUED | OUTPATIENT
Start: 2019-06-05 | End: 2019-06-05 | Stop reason: HOSPADM

## 2019-06-05 RX ORDER — SODIUM CHLORIDE, SODIUM LACTATE, POTASSIUM CHLORIDE, CALCIUM CHLORIDE 600; 310; 30; 20 MG/100ML; MG/100ML; MG/100ML; MG/100ML
125 INJECTION, SOLUTION INTRAVENOUS CONTINUOUS
Status: DISCONTINUED | OUTPATIENT
Start: 2019-06-05 | End: 2019-06-07 | Stop reason: HOSPADM

## 2019-06-05 RX ORDER — HYDROMORPHONE HYDROCHLORIDE 1 MG/ML
INJECTION, SOLUTION INTRAMUSCULAR; INTRAVENOUS; SUBCUTANEOUS AS NEEDED
Status: DISCONTINUED | OUTPATIENT
Start: 2019-06-05 | End: 2019-06-05 | Stop reason: HOSPADM

## 2019-06-05 RX ORDER — OXYCODONE AND ACETAMINOPHEN 5; 325 MG/1; MG/1
1 TABLET ORAL AS NEEDED
Status: DISCONTINUED | OUTPATIENT
Start: 2019-06-05 | End: 2019-06-05 | Stop reason: HOSPADM

## 2019-06-05 RX ORDER — SODIUM CHLORIDE 9 MG/ML
INJECTION, SOLUTION INTRAVENOUS
Status: DISCONTINUED | OUTPATIENT
Start: 2019-06-05 | End: 2019-06-05 | Stop reason: HOSPADM

## 2019-06-05 RX ORDER — SODIUM CHLORIDE 0.9 % (FLUSH) 0.9 %
5-40 SYRINGE (ML) INJECTION AS NEEDED
Status: DISCONTINUED | OUTPATIENT
Start: 2019-06-05 | End: 2019-06-05 | Stop reason: HOSPADM

## 2019-06-05 RX ORDER — CEFAZOLIN SODIUM 2 G/50ML
2 SOLUTION INTRAVENOUS ONCE
Status: COMPLETED | OUTPATIENT
Start: 2019-06-05 | End: 2019-06-05

## 2019-06-05 RX ORDER — GLYCOPYRROLATE 0.2 MG/ML
INJECTION INTRAMUSCULAR; INTRAVENOUS AS NEEDED
Status: DISCONTINUED | OUTPATIENT
Start: 2019-06-05 | End: 2019-06-05 | Stop reason: HOSPADM

## 2019-06-05 RX ORDER — ONDANSETRON 2 MG/ML
INJECTION INTRAMUSCULAR; INTRAVENOUS AS NEEDED
Status: DISCONTINUED | OUTPATIENT
Start: 2019-06-05 | End: 2019-06-05 | Stop reason: HOSPADM

## 2019-06-05 RX ORDER — BUPIVACAINE HYDROCHLORIDE AND EPINEPHRINE 5; 5 MG/ML; UG/ML
INJECTION, SOLUTION EPIDURAL; INTRACAUDAL; PERINEURAL AS NEEDED
Status: DISCONTINUED | OUTPATIENT
Start: 2019-06-05 | End: 2019-06-05 | Stop reason: HOSPADM

## 2019-06-05 RX ORDER — MAGNESIUM SULFATE 100 %
4 CRYSTALS MISCELLANEOUS AS NEEDED
Status: DISCONTINUED | OUTPATIENT
Start: 2019-06-05 | End: 2019-06-05 | Stop reason: HOSPADM

## 2019-06-05 RX ORDER — HYDROMORPHONE HYDROCHLORIDE 2 MG/ML
0.5 INJECTION, SOLUTION INTRAMUSCULAR; INTRAVENOUS; SUBCUTANEOUS
Status: DISCONTINUED | OUTPATIENT
Start: 2019-06-05 | End: 2019-06-05 | Stop reason: HOSPADM

## 2019-06-05 RX ORDER — NALOXONE HYDROCHLORIDE 0.4 MG/ML
0.4 INJECTION, SOLUTION INTRAMUSCULAR; INTRAVENOUS; SUBCUTANEOUS AS NEEDED
Status: DISCONTINUED | OUTPATIENT
Start: 2019-06-05 | End: 2019-06-07 | Stop reason: HOSPADM

## 2019-06-05 RX ORDER — KETOROLAC TROMETHAMINE 30 MG/ML
30 INJECTION, SOLUTION INTRAMUSCULAR; INTRAVENOUS
Status: DISPENSED | OUTPATIENT
Start: 2019-06-05 | End: 2019-06-06

## 2019-06-05 RX ORDER — SODIUM CHLORIDE, SODIUM LACTATE, POTASSIUM CHLORIDE, CALCIUM CHLORIDE 600; 310; 30; 20 MG/100ML; MG/100ML; MG/100ML; MG/100ML
25 INJECTION, SOLUTION INTRAVENOUS CONTINUOUS
Status: DISCONTINUED | OUTPATIENT
Start: 2019-06-05 | End: 2019-06-05 | Stop reason: HOSPADM

## 2019-06-05 RX ORDER — CEFAZOLIN SODIUM 1 G/3ML
INJECTION, POWDER, FOR SOLUTION INTRAMUSCULAR; INTRAVENOUS AS NEEDED
Status: DISCONTINUED | OUTPATIENT
Start: 2019-06-05 | End: 2019-06-05 | Stop reason: HOSPADM

## 2019-06-05 RX ORDER — SODIUM CHLORIDE, SODIUM LACTATE, POTASSIUM CHLORIDE, CALCIUM CHLORIDE 600; 310; 30; 20 MG/100ML; MG/100ML; MG/100ML; MG/100ML
100 INJECTION, SOLUTION INTRAVENOUS CONTINUOUS
Status: DISCONTINUED | OUTPATIENT
Start: 2019-06-05 | End: 2019-06-05 | Stop reason: HOSPADM

## 2019-06-05 RX ORDER — SODIUM CHLORIDE 0.9 % (FLUSH) 0.9 %
5-40 SYRINGE (ML) INJECTION EVERY 8 HOURS
Status: DISCONTINUED | OUTPATIENT
Start: 2019-06-05 | End: 2019-06-05 | Stop reason: HOSPADM

## 2019-06-05 RX ORDER — MIDAZOLAM HYDROCHLORIDE 1 MG/ML
INJECTION, SOLUTION INTRAMUSCULAR; INTRAVENOUS AS NEEDED
Status: DISCONTINUED | OUTPATIENT
Start: 2019-06-05 | End: 2019-06-05 | Stop reason: HOSPADM

## 2019-06-05 RX ORDER — KETOROLAC TROMETHAMINE 30 MG/ML
INJECTION, SOLUTION INTRAMUSCULAR; INTRAVENOUS AS NEEDED
Status: DISCONTINUED | OUTPATIENT
Start: 2019-06-05 | End: 2019-06-05 | Stop reason: HOSPADM

## 2019-06-05 RX ORDER — PROPOFOL 10 MG/ML
INJECTION, EMULSION INTRAVENOUS AS NEEDED
Status: DISCONTINUED | OUTPATIENT
Start: 2019-06-05 | End: 2019-06-05 | Stop reason: HOSPADM

## 2019-06-05 RX ORDER — LIDOCAINE HYDROCHLORIDE 20 MG/ML
INJECTION, SOLUTION EPIDURAL; INFILTRATION; INTRACAUDAL; PERINEURAL AS NEEDED
Status: DISCONTINUED | OUTPATIENT
Start: 2019-06-05 | End: 2019-06-05 | Stop reason: HOSPADM

## 2019-06-05 RX ORDER — EPHEDRINE SULFATE 50 MG/ML
INJECTION, SOLUTION INTRAVENOUS AS NEEDED
Status: DISCONTINUED | OUTPATIENT
Start: 2019-06-05 | End: 2019-06-05 | Stop reason: HOSPADM

## 2019-06-05 RX ORDER — ONDANSETRON 2 MG/ML
8 INJECTION INTRAMUSCULAR; INTRAVENOUS
Status: DISCONTINUED | OUTPATIENT
Start: 2019-06-05 | End: 2019-06-07 | Stop reason: HOSPADM

## 2019-06-05 RX ORDER — INSULIN LISPRO 100 [IU]/ML
INJECTION, SOLUTION INTRAVENOUS; SUBCUTANEOUS ONCE
Status: DISCONTINUED | OUTPATIENT
Start: 2019-06-05 | End: 2019-06-05 | Stop reason: HOSPADM

## 2019-06-05 RX ORDER — CEFAZOLIN SODIUM 2 G/50ML
2 SOLUTION INTRAVENOUS EVERY 8 HOURS
Status: COMPLETED | OUTPATIENT
Start: 2019-06-05 | End: 2019-06-06

## 2019-06-05 RX ADMIN — CEFAZOLIN SODIUM 2 G: 1 INJECTION, POWDER, FOR SOLUTION INTRAMUSCULAR; INTRAVENOUS at 12:55

## 2019-06-05 RX ADMIN — Medication 3 MG: at 12:55

## 2019-06-05 RX ADMIN — ROCURONIUM BROMIDE 50 MG: 10 INJECTION, SOLUTION INTRAVENOUS at 08:57

## 2019-06-05 RX ADMIN — HYDROMORPHONE HYDROCHLORIDE 0.5 MG: 1 INJECTION, SOLUTION INTRAMUSCULAR; INTRAVENOUS; SUBCUTANEOUS at 12:54

## 2019-06-05 RX ADMIN — METHYLENE BLUE 5 ML: 10 INJECTION INTRAVENOUS at 10:51

## 2019-06-05 RX ADMIN — FAMOTIDINE 20 MG: 20 TABLET, FILM COATED ORAL at 08:32

## 2019-06-05 RX ADMIN — SODIUM CHLORIDE: 9 INJECTION, SOLUTION INTRAVENOUS at 11:23

## 2019-06-05 RX ADMIN — KETOROLAC TROMETHAMINE 30 MG: 30 INJECTION, SOLUTION INTRAMUSCULAR at 21:38

## 2019-06-05 RX ADMIN — LIDOCAINE HYDROCHLORIDE 80 MG: 20 INJECTION, SOLUTION EPIDURAL; INFILTRATION; INTRACAUDAL; PERINEURAL at 08:54

## 2019-06-05 RX ADMIN — GLYCOPYRROLATE 0.2 MG: 0.2 INJECTION INTRAMUSCULAR; INTRAVENOUS at 12:55

## 2019-06-05 RX ADMIN — CEFAZOLIN SODIUM 2 G: 2 SOLUTION INTRAVENOUS at 09:00

## 2019-06-05 RX ADMIN — SODIUM CHLORIDE, SODIUM LACTATE, POTASSIUM CHLORIDE, AND CALCIUM CHLORIDE: 600; 310; 30; 20 INJECTION, SOLUTION INTRAVENOUS at 13:06

## 2019-06-05 RX ADMIN — ROCURONIUM BROMIDE 5 MG: 10 INJECTION, SOLUTION INTRAVENOUS at 11:49

## 2019-06-05 RX ADMIN — CEFAZOLIN 2 G: 10 INJECTION, POWDER, FOR SOLUTION INTRAVENOUS at 21:31

## 2019-06-05 RX ADMIN — SODIUM CHLORIDE: 9 INJECTION, SOLUTION INTRAVENOUS at 09:45

## 2019-06-05 RX ADMIN — SODIUM CHLORIDE, SODIUM LACTATE, POTASSIUM CHLORIDE, AND CALCIUM CHLORIDE 100 ML/HR: 600; 310; 30; 20 INJECTION, SOLUTION INTRAVENOUS at 13:57

## 2019-06-05 RX ADMIN — HYDROMORPHONE HYDROCHLORIDE 0.5 MG: 1 INJECTION, SOLUTION INTRAMUSCULAR; INTRAVENOUS; SUBCUTANEOUS at 13:18

## 2019-06-05 RX ADMIN — EPHEDRINE SULFATE 10 MG: 50 INJECTION, SOLUTION INTRAVENOUS at 10:02

## 2019-06-05 RX ADMIN — MIDAZOLAM HYDROCHLORIDE 2 MG: 1 INJECTION, SOLUTION INTRAMUSCULAR; INTRAVENOUS at 08:46

## 2019-06-05 RX ADMIN — ONDANSETRON 4 MG: 2 INJECTION INTRAMUSCULAR; INTRAVENOUS at 12:49

## 2019-06-05 RX ADMIN — SODIUM CHLORIDE, SODIUM LACTATE, POTASSIUM CHLORIDE, AND CALCIUM CHLORIDE 25 ML/HR: 600; 310; 30; 20 INJECTION, SOLUTION INTRAVENOUS at 08:32

## 2019-06-05 RX ADMIN — HYDROMORPHONE HYDROCHLORIDE 0.5 MG: 1 INJECTION, SOLUTION INTRAMUSCULAR; INTRAVENOUS; SUBCUTANEOUS at 12:50

## 2019-06-05 RX ADMIN — DEXAMETHASONE SODIUM PHOSPHATE 4 MG: 4 INJECTION, SOLUTION INTRA-ARTICULAR; INTRALESIONAL; INTRAMUSCULAR; INTRAVENOUS; SOFT TISSUE at 09:00

## 2019-06-05 RX ADMIN — SODIUM CHLORIDE, SODIUM LACTATE, POTASSIUM CHLORIDE, AND CALCIUM CHLORIDE 125 ML/HR: 600; 310; 30; 20 INJECTION, SOLUTION INTRAVENOUS at 14:44

## 2019-06-05 RX ADMIN — GLYCOPYRROLATE 0.1 MG: 0.2 INJECTION INTRAMUSCULAR; INTRAVENOUS at 12:57

## 2019-06-05 RX ADMIN — OXYCODONE HYDROCHLORIDE AND ACETAMINOPHEN 1 TABLET: 5; 325 TABLET ORAL at 17:54

## 2019-06-05 RX ADMIN — HYDROMORPHONE HYDROCHLORIDE 1 MG: 1 INJECTION, SOLUTION INTRAMUSCULAR; INTRAVENOUS; SUBCUTANEOUS at 08:46

## 2019-06-05 RX ADMIN — FENTANYL CITRATE 100 MCG: 50 INJECTION, SOLUTION INTRAMUSCULAR; INTRAVENOUS at 08:54

## 2019-06-05 RX ADMIN — PROPOFOL 100 MG: 10 INJECTION, EMULSION INTRAVENOUS at 08:55

## 2019-06-05 RX ADMIN — ROCURONIUM BROMIDE 10 MG: 10 INJECTION, SOLUTION INTRAVENOUS at 10:55

## 2019-06-05 RX ADMIN — Medication 10 ML: at 21:46

## 2019-06-05 RX ADMIN — KETOROLAC TROMETHAMINE 30 MG: 30 INJECTION, SOLUTION INTRAMUSCULAR; INTRAVENOUS at 12:52

## 2019-06-05 NOTE — ANESTHESIA PREPROCEDURE EVALUATION
Relevant Problems   No relevant active problems       Anesthetic History   No history of anesthetic complications            Review of Systems / Medical History  Patient summary reviewed and pertinent labs reviewed    Pulmonary  Within defined limits                 Neuro/Psych   Within defined limits           Cardiovascular    Hypertension: well controlled              Exercise tolerance: >4 METS     GI/Hepatic/Renal  Within defined limits              Endo/Other        Anemia     Other Findings              Physical Exam    Airway  Mallampati: II  TM Distance: 4 - 6 cm  Neck ROM: normal range of motion   Mouth opening: Normal     Cardiovascular    Rhythm: regular  Rate: normal         Dental  No notable dental hx       Pulmonary  Breath sounds clear to auscultation               Abdominal  GI exam deferred       Other Findings            Anesthetic Plan    ASA: 3  Anesthesia type: general          Induction: Intravenous  Anesthetic plan and risks discussed with: Patient

## 2019-06-05 NOTE — OP NOTES
OPERATIVE REPORT    Preop Diagnosis:   Patient Active Problem List   Diagnosis Code    Essential hypertension I10    Menometrorrhagia N92.1    Iron deficiency anemia due to chronic blood loss D50.0    S/P hysterectomy Z90.710       Postop Diagnosis: Same  Procedure: 1. Robotic Assisted Total Laparoscopic Hysterectomy. 2.  Bilateral salpingectomy. 3.  Observational Cystoscopy. Surgeon: Cuco Zazueta MD  Assistant: José Blanco SA  Anesthesia: GETA  EBL: 200 mL  UO:  450 mL  IVF:  2700 mL  Uterine Weight: 773 grams  Indication:  Ms. Laina Osuna is a 50 y. o. [address] with history of   Patient Active Problem List   Diagnosis Code    Essential hypertension I10    Menometrorrhagia N92.1    Iron deficiency anemia due to chronic blood loss D50.0    S/P hysterectomy Z90.710     After considering all her therapeutic options, she opted to proceed with the above-mentioned procedure. Risks, benefits, and alternatives were reviewed preoperatively. Findings:  18 wk size, multifibroid, bulky uterus. Normal appearing cervix. 50% of procedure time was spent performing intraperitoneal morcellation. Normal ovaries. Paratubal cysts on both ovaries. Intact bladder and normal ureteral efflux of clear urine bilaterally. Specimens: Fibroid Uterus and fallopian tubes in morcellated fragments to permanent pathology. No immediate complication. Procedure: Ms. Laina Osuna was brought to the operating room where patient and surgery identification were completed with the patient and the OR team. She was placed in dorsal supine position and general anesthesia was administered. Both arms were padded and tucked. SCD's were on and activated. She was placed in what was felt to be neurologically safe lithotomy position in yellow fin stirrups and prepped in the normal sterile fashion. Exam under anesthesia was performed.  Indwelling bauer catheter was inserted into the bladder. A speculum was placed vaginally and the anterior lip of the cervix was grasped with a single toothed tenaculum. The cervix was serially dilated and the uterus sounded to 14 cm. An appropriately sized uterine manipulator was selected and inserted without difficulty. Once secured in place, all other instruments were removed. Gloves were changed. Attention was turned to the abdomen. Entry to the abdomen was via optiview utilizing a 5 mm umbilical incision. The abdomen insufflated. The lateral trocars were inserted under direct vision. Inspection of the abdomen and pelvis showed findings as above. The patient was placed in appropriate amount of [place] to allow retraction of the bowels from the operative field. The [employer] robotic system was then docked without difficulty. Hysterectomy proceeded in the usual manner using the surgeon's console. The pelvis was examined with the above noted findings. The course of the ureters was identified. The left fallopian tube, uteroovarian ligament and round ligament were cautarized and transected. The posterior and anterior broad ligament was serially dissected toward the colpotomizer cup. The uterine vessels were skeletonized. The bladder flap was carefully created. The uterine vessels were serially cautarized and transected. The same steps performed contralaterally. Colpotomy was initiated posteriory and carried circumferentially until completion. The specimen was removed via extracorporeal manual tissue extraction through vaginally with difficulty. Morcellation was 2 hours. Vaginal cuff closure was completed using V-loc suture. The pelvis was copiously irrigated and suctioned. Hemostasis was noted under low pressure. Interceed was wrapped around each ovary. Observational cystoscopy was performed revealing intact bladder and normal ureteral efflux of clear urine bilaterally. The umbilical incision closed using endoclosure device.  The incisions were closed with subcuticular 3-0 Monocryl suture and dermabond applied. Sponge, lap, needle and instrument counts were correct x 2. The patient was transferred to recovery extubated and in stable condition.    Alberto Encarnacion MD  June 5, 2019

## 2019-06-05 NOTE — PROGRESS NOTES
1452  Received from PACU, fully awake, not in pain at this time, moving all extremities, no vaginal bleeding at this time, bauer intact with blue greenish, IV site no redness and no swelling. Venegas with in reach,family about 8 of them all in the room, so far pt was not in  Pain, informed pt about importance of triflo, raised up to 1000 ml, informed  The color of the urine and importance of  Sequential stockings. Told her  To take clear liquid and advance diet tomorrow. 1500  Temp 100, encourage triflo, monitor. 1630  Sleeping soundly. 1800 medicated  With Percoset  for pain when she  CDB and with triflo. Refused to eat but takes sips of clear. 1845  Temp down to 99, encourage triflo, pain under control at this time with Percoset. 1930  Resting soundly during bedside report.

## 2019-06-05 NOTE — INTERVAL H&P NOTE
H&P Update:  Dina Plata was seen and examined. History and physical has been reviewed. The patient has been examined.  There have been no significant clinical changes since the completion of the originally dated History and Physical.

## 2019-06-05 NOTE — ANESTHESIA POSTPROCEDURE EVALUATION
Procedure(s):  Robotic Total laparoscopic hysterectomy, Bilateral Salpingectomy observational cystoscopy. general    Anesthesia Post Evaluation      Multimodal analgesia: multimodal analgesia used between 6 hours prior to anesthesia start to PACU discharge  Patient location during evaluation: bedside  Patient participation: complete - patient participated  Level of consciousness: awake  Pain management: adequate  Airway patency: patent  Anesthetic complications: no  Cardiovascular status: stable  Respiratory status: acceptable  Hydration status: acceptable  Post anesthesia nausea and vomiting:  controlled      Vitals Value Taken Time   BP 97/52 6/5/2019  2:10 PM   Temp 36.9 °C (98.5 °F) 6/5/2019  2:24 PM   Pulse 72 6/5/2019  2:24 PM   Resp 24 6/5/2019  2:24 PM   SpO2 97 % 6/5/2019  2:25 PM   Vitals shown include unvalidated device data.

## 2019-06-06 LAB
BASOPHILS # BLD: 0 K/UL (ref 0–0.1)
BASOPHILS NFR BLD: 0 % (ref 0–2)
DIFFERENTIAL METHOD BLD: ABNORMAL
EOSINOPHIL # BLD: 0 K/UL (ref 0–0.4)
EOSINOPHIL NFR BLD: 0 % (ref 0–5)
ERYTHROCYTE [DISTWIDTH] IN BLOOD BY AUTOMATED COUNT: 18.2 % (ref 11.6–14.5)
HCT VFR BLD AUTO: 23.5 % (ref 35–45)
HGB BLD-MCNC: 6.9 G/DL (ref 12–16)
LYMPHOCYTES # BLD: 1.3 K/UL (ref 0.9–3.6)
LYMPHOCYTES NFR BLD: 10 % (ref 21–52)
MCH RBC QN AUTO: 20 PG (ref 24–34)
MCHC RBC AUTO-ENTMCNC: 29.4 G/DL (ref 31–37)
MCV RBC AUTO: 68.1 FL (ref 74–97)
MONOCYTES # BLD: 1.1 K/UL (ref 0.05–1.2)
MONOCYTES NFR BLD: 9 % (ref 3–10)
NEUTS SEG # BLD: 10 K/UL (ref 1.8–8)
NEUTS SEG NFR BLD: 81 % (ref 40–73)
PLATELET # BLD AUTO: 251 K/UL (ref 135–420)
PMV BLD AUTO: 10 FL (ref 9.2–11.8)
RBC # BLD AUTO: 3.45 M/UL (ref 4.2–5.3)
WBC # BLD AUTO: 12.4 K/UL (ref 4.6–13.2)

## 2019-06-06 PROCEDURE — 36415 COLL VENOUS BLD VENIPUNCTURE: CPT

## 2019-06-06 PROCEDURE — 97162 PT EVAL MOD COMPLEX 30 MIN: CPT

## 2019-06-06 PROCEDURE — 74011250636 HC RX REV CODE- 250/636: Performed by: OBSTETRICS & GYNECOLOGY

## 2019-06-06 PROCEDURE — 96375 TX/PRO/DX INJ NEW DRUG ADDON: CPT

## 2019-06-06 PROCEDURE — 97530 THERAPEUTIC ACTIVITIES: CPT

## 2019-06-06 PROCEDURE — 85025 COMPLETE CBC W/AUTO DIFF WBC: CPT

## 2019-06-06 PROCEDURE — 74011250637 HC RX REV CODE- 250/637: Performed by: OBSTETRICS & GYNECOLOGY

## 2019-06-06 RX ADMIN — SODIUM CHLORIDE, SODIUM LACTATE, POTASSIUM CHLORIDE, AND CALCIUM CHLORIDE 125 ML/HR: 600; 310; 30; 20 INJECTION, SOLUTION INTRAVENOUS at 11:50

## 2019-06-06 RX ADMIN — Medication 10 ML: at 14:00

## 2019-06-06 RX ADMIN — KETOROLAC TROMETHAMINE 30 MG: 30 INJECTION, SOLUTION INTRAMUSCULAR at 03:23

## 2019-06-06 RX ADMIN — CEFAZOLIN 2 G: 10 INJECTION, POWDER, FOR SOLUTION INTRAVENOUS at 04:52

## 2019-06-06 RX ADMIN — OXYCODONE HYDROCHLORIDE AND ACETAMINOPHEN 1 TABLET: 5; 325 TABLET ORAL at 22:22

## 2019-06-06 RX ADMIN — SODIUM CHLORIDE, SODIUM LACTATE, POTASSIUM CHLORIDE, AND CALCIUM CHLORIDE 125 ML/HR: 600; 310; 30; 20 INJECTION, SOLUTION INTRAVENOUS at 03:40

## 2019-06-06 RX ADMIN — Medication 10 ML: at 05:46

## 2019-06-06 RX ADMIN — OXYCODONE HYDROCHLORIDE AND ACETAMINOPHEN 1 TABLET: 5; 325 TABLET ORAL at 16:24

## 2019-06-06 RX ADMIN — SODIUM CHLORIDE, SODIUM LACTATE, POTASSIUM CHLORIDE, AND CALCIUM CHLORIDE 125 ML/HR: 600; 310; 30; 20 INJECTION, SOLUTION INTRAVENOUS at 20:55

## 2019-06-06 RX ADMIN — OXYCODONE HYDROCHLORIDE AND ACETAMINOPHEN 1 TABLET: 5; 325 TABLET ORAL at 09:23

## 2019-06-06 RX ADMIN — CEFAZOLIN 2 G: 10 INJECTION, POWDER, FOR SOLUTION INTRAVENOUS at 13:58

## 2019-06-06 NOTE — ROUTINE PROCESS
Bedside and Verbal shift change report given to Avani De La Garza   (oncoming nurse) by Marcelina Ramos RN   (offgoing nurse). Report included the following information SBAR, Kardex and MAR.

## 2019-06-06 NOTE — PROGRESS NOTES
Bedside shift change report given to Rowan (oncoming nurse) by Michael Ascencio (offgoing nurse). Report included the following information SBAR, Kardex, Intake/Output and MAR.

## 2019-06-06 NOTE — PROGRESS NOTES
Gynecology Progress Note    Patient doing well post-op day 1 from Procedure(s):  Robotic Total laparoscopic hysterectomy, Bilateral Salpingectomy observational cystoscopy without significant complaints. Pain controlled on current medication. Voiding without difficulty. Patient is passing flatus. No acute events overnight. She does complain of left lateral pain and numbness from thigh down. She is ambulating, but does drag the left leg. Vitals:  Blood pressure 142/64, pulse 62, temperature 98 °F (36.7 °C), resp. rate 16, height 5' 3\" (1.6 m), weight 158 lb 8 oz (71.9 kg), SpO2 100 %. Temp (24hrs), Av.6 °F (37 °C), Min:97.7 °F (36.5 °C), Max:100 °F (37.8 °C)        Exam:  Patient without distress. Abdomen soft,  nontender. Incision dry and clean without erythema. Lower extremities are negative for swelling, cords, or tenderness. Lab/Data Review:  CBC:   Lab Results   Component Value Date/Time    WBC 12.4 2019 04:05 AM    HGB 6.9 (L) 2019 04:05 AM    HCT 23.5 (L) 2019 04:05 AM     2019 04:05 AM       Assessment and Plan:  Patient appears to be having uncomplicated post Procedure(s):  Robotic Total laparoscopic hysterectomy, Bilateral Salpingectomy observational cystoscopy course. Continue routine post-op care. 1.  Asymptomatic anemia. Will start Iron. 2. Leg pain/numbness most likely due to length of the surgery and position. PT consult pending. Possible PM discharge. Pt declined transfusion.

## 2019-06-06 NOTE — PROGRESS NOTES
Assisted with ambulation to the hallway from the bed. Noted that patient was crying prior to ambulation. Stated pain is a 9 on the 1-10 scale. Used distraction, repositioning, and ambulation to assist with pain management. Patient was able to ambulate to the hallway with one person standby assist.  Noted that patient was not bending her right knee and shaking due to the discomfort and tenderness in upper and lower right quadrants. Verbal cues and coaching assisted with ambulation. Patient was able to navigate to hallway while bringing her IV pole. Patient turned around once she ambulated to lighter colored tulio in the hallway and headed back to her bed. Noted decrease in shaking and pain. Able to talk without dyspnea while ambulating. Stated that cyst in abdomen was making her abdomen deviated to the left. Able to get back in bed. SCDs reconnected and restarted. Patient repositioned back in bed. Incentive spirometry times two without difficulties. Patient requests to see physician regarding difficulty bending right knee.     Venus Guzman, RN, BSN

## 2019-06-06 NOTE — PROGRESS NOTES
conducted an initial consultation and Spiritual Assessment for Ivan Nuñez, who is a 50 y.o.,female. Patients Primary Language is: [place]. According to the patients EMR Worship Affiliation is: No preference. The reason the Patient came to the hospital is:   Patient Active Problem List    Diagnosis Date Noted    S/P hysterectomy 06/05/2019    Essential hypertension 05/21/2019    Menometrorrhagia 05/21/2019    Iron deficiency anemia due to chronic blood loss 05/21/2019        The  provided the following Interventions:  Initiated a relationship of care and support. Explored issues of alen, spirituality and/or Episcopal needs while hospitalized. Listened empathically. Provided chaplaincy education. Provided information about Spiritual Care Services. Offered prayer and assurance of continued prayers on patient's behalf. Chart reviewed. The following outcomes were achieved:  Patient shared some information about their medical narrative and spiritual journey/beliefs. Patient processed feeling about current hospitalization. Patient expressed gratitude for the 's visit. Assessment:  Patient did not indicate any spiritual or Episcopal issues which require Spiritual Care Services interventions at this time. Patient does not have any Episcopal/cultural needs that will affect patients preferences in health care. Plan:  Chaplains will continue to follow and will provide pastoral care on an as needed or requested basis.  recommends bedside caregivers page  on duty if patient shows signs of acute spiritual or emotional distress.     [facility]   Staff [facility]   [phone]

## 2019-06-06 NOTE — PROGRESS NOTES
Problem: Discharge Planning  Goal: *Discharge to safe environment  Outcome: Resolved/Met       Home    Reason for Admission:      Menometrorrhagia   Robotic Assisted Total Laparoscopic Hysterectomy. Bilateral salpingectomy Observational Cystoscopy. RRAT Score:   7                  Plan for utilizing home health:  Not indicated                      Current Advanced Directive/Advance Care Plan:  Not on file, does not want to complete one. Likelihood of Readmission:  Low/Green                         Transition of Care Plan:    Home with family support & out-pt follow up. Chart reviewed. Met with pt., verified all demographics. States has [employer] ins. States sees Ronit Priest, last seen 5/28/19. NOK: Jayson Degroot, twin sister: [phone]. States her 23yr old daughter lives with her. Uses no DME. Independent with ADL's prior to admit. States her PCP & family know she's here & no need to notify anyone. Will cont to follow for any needs. Sudha JonesRN,ext [phone]. Patient has designated her sister to participate in his/her discharge plan and to receive any needed information. Name: Jayson Degroot  Address:  Phone number:  [phone]    Patient and/or next of kin has been given the Outpatient Observation Information and Notification letter and all questions answered. Care Management Interventions  PCP Verified by CM: Yes(Diamante Goddard NP / Dr. Maykel Borrego)  Last Visit to PCP: 05/28/19  Palliative Care Criteria Met (RRAT>21 & CHF Dx)?: No  Mode of Transport at Discharge: Other (see comment)  Transition of Care Consult (CM Consult): Discharge Planning  Discharge Durable Medical Equipment: No  Physical Therapy Consult: No  Occupational Therapy Consult: No  Speech Therapy Consult: No  Current Support Network:  Other(23yr old dtr lives with pt)  Confirm Follow Up Transport: Family  Plan discussed with Pt/Family/Caregiver: Yes  Discharge Location  Discharge Placement: Home

## 2019-06-06 NOTE — PROGRESS NOTES
Bedside and Verbal shift change report given to Clarissa Pierce RN (oncoming nurse) by Ashlee Tracy RN (offgoing nurse). Report included the following information SBAR, Kardex, Procedure Summary, Intake/Output and MAR.       1120- MD made aware of hemoglobin of 6.9, and complaint of tingling on light lower extremity. MD will put in for a PT consult and evaluate pt. No new orders at this time. Bedside and Verbal shift change report given to Ravinder Nieves RN (oncoming nurse) by Clarissa Pierce RN (offgoing nurse). Report included the following information SBAR, Kardex, Procedure Summary, Intake/Output and MAR.

## 2019-06-06 NOTE — PROGRESS NOTES
1930  Received bedside report from [facility]. Patient resting quietly in bed. Family at bedside. Call bell within reach. 2145  Patient requesting food,  Educated patient on clear liquid diet until she is passing gas. Gave popsicle. 1859  Patient in bed resting quietly, denies any pain at this time. Family at bedside. 0320  Patient complaining of pain of a 6. Gave Toradol as ordered. 0530  Removed bauer,  Patient out of bed and walked around room. Was in too much pain to walk any further but did not want to take pain medication on empty stomach. Called supervisor to find jello for patient. Per supervisor Otf Ponce will be ready in a couple of hours\"    0600  Patient resting comfortably in bed,  Call bell within reach, family at bedside.

## 2019-06-06 NOTE — PROGRESS NOTES
Problem: Falls - Risk of  Goal: *Absence of Falls  Description  Document [place] Fall Risk and appropriate interventions in the flowsheet. Outcome: Progressing Towards Goal     Problem: Patient Education: Go to Patient Education Activity  Goal: Patient/Family Education  Outcome: Progressing Towards Goal     Problem: Pressure Injury - Risk of  Goal: *Prevention of pressure injury  Description  Document Denny Scale and appropriate interventions in the flowsheet.   Outcome: Progressing Towards Goal     Problem: Patient Education: Go to Patient Education Activity  Goal: Patient/Family Education  Outcome: Progressing Towards Goal     Problem: Pain  Goal: *Control of Pain  Outcome: Progressing Towards Goal     Problem: Patient Education: Go to Patient Education Activity  Goal: Patient/Family Education  Outcome: Progressing Towards Goal     Problem: Infection - Risk of, Surgical Site Infection  Goal: *Absence of surgical site infection signs and symptoms  Outcome: Progressing Towards Goal

## 2019-06-06 NOTE — PROGRESS NOTES
Problem: Falls - Risk of  Goal: *Absence of Falls  Description  Document Krunal Anders Fall Risk and appropriate interventions in the flowsheet. Outcome: Progressing Towards Goal     Problem: Patient Education: Go to Patient Education Activity  Goal: Patient/Family Education  Outcome: Progressing Towards Goal     Problem: Pressure Injury - Risk of  Goal: *Prevention of pressure injury  Description  Document Denny Scale and appropriate interventions in the flowsheet.   Outcome: Progressing Towards Goal     Problem: Patient Education: Go to Patient Education Activity  Goal: Patient/Family Education  Outcome: Progressing Towards Goal     Problem: Pain  Goal: *Control of Pain  Outcome: Progressing Towards Goal     Problem: Patient Education: Go to Patient Education Activity  Goal: Patient/Family Education  Outcome: Progressing Towards Goal     Problem: Infection - Risk of, Surgical Site Infection  Goal: *Absence of surgical site infection signs and symptoms  Outcome: Progressing Towards Goal     Problem: Patient Education: Go to Patient Education Activity  Goal: Patient/Family Education  Outcome: Progressing Towards Goal

## 2019-06-06 NOTE — PROGRESS NOTES
Problem: Mobility Impaired (Adult and Pediatric)  Goal: *Acute Goals and Plan of Care (Insert Text)  Description  Physical Therapy Goals   Initiated 6/6/2019 and to be accomplished within 7 days. 1.  Patient will ambulate 300 feet with independence using LRAD in order to prepare for safe negotiation of their environment. Outcome: Progressing Towards Goal   PHYSICAL THERAPY EVALUATION    Patient: Carol Yi (45 y.o. female)  Date: 6/6/2019  Primary Diagnosis: S/P hysterectomy [Z90.710]  Procedure(s) (LRB):  Robotic Total laparoscopic hysterectomy, Bilateral Salpingectomy observational cystoscopy (N/A) 1 Day Post-Op   Precautions:        PLOF: Independent     ASSESSMENT :  Patient supine in bed, agreeable to participation with PT. Patient with c/o of R LE N/T and pain. Sensation in R LE absent to light touch in L3-L5 dermatomes, diminished in L5 - S1 o light touch. Sensation intact in L LE. R LE with decreased strength/AROM; L LE strength 4+/5. PROM of L LE WFL. Pain increased with PROM on R; patient describes pain as sharp burning pain with stretch. Pain described as pins and needles when R LE in resting position. Supervision for supine <> sit and sit <> stand. Poor standing tolerance. Unable to tolerate standing greater than 1 minute d/t increased pain and dizziness. Patient returned to bed with LEs in dependent position; dizziness resolved. Gait deferred. SCD removed on R LE. Nursing notified. Patient presenting with symptoms consistent with neuropraxia. Pain rated 9/10. Patient educated on safety with mobility upon return home. Patient educated on role of PT, PT POC, bed mobility, transfers, gait, and safety with mobility as indicated. Plan to see for 1 more visit to assess gait and safety. Recommend d/c home without need for skilled PT pending progression. Patient will benefit from skilled intervention to address the above impairments.   Patient's rehabilitation potential is considered to be Excellent  Factors which may influence rehabilitation potential include:   ? None noted  ? Mental ability/status  ? Medical condition  ? Home/family situation and support systems  ? Safety awareness  ? Pain tolerance/management  ? Other:      PLAN :  Recommendations and Planned Interventions:   ?           Bed Mobility Training             ? Neuromuscular Re-Education  ? Transfer Training                   ? Orthotic/Prosthetic Training  ? Gait Training                          ? Modalities  ? Therapeutic Exercises           ? Edema Management/Control  ? Therapeutic Activities            ? Family Training/Education  ? Patient Education  ? Other (comment):    Frequency/Duration: Patient will be followed by physical therapy 1-2 more visits to address goals. Discharge Recommendations: None  Further Equipment Recommendations for Discharge: N/A     SUBJECTIVE:   Patient stated It hurts so bad.     OBJECTIVE DATA SUMMARY:     Past Medical History:   Diagnosis Date    Essential hypertension 2019    Iron deficiency anemia due to chronic blood loss 2019    Low iron      Past Surgical History:   Procedure Laterality Date    HX  SECTION       Barriers to Learning/Limitations: None  Compensate with: N/A  Home Situation:  Home Situation  Home Environment: Private residence  One/Two Story Residence: One story  Living Alone: No  Support Systems: Family member(s)  Patient Expects to be Discharged to[de-identified] Private residence  Current DME Used/Available at Home: None  Critical Behavior:  Neurologic State: Alert  Orientation Level: Oriented X4  Cognition: Follows commands     Psychosocial  Patient Behaviors: Calm; Cooperative  Purposeful Interaction: Yes    Strength:    Strength: Generally decreased, functional(Decreased in R Knee)    Tone & Sensation:   Tone: Normal   Sensation: Impaired(Diminshed to light touch in L3 - L5 dermatomes in R LE)    Range Of Motion:  AROM: Generally decreased, functional     PROM: Within functional limits    Functional Mobility:  Bed Mobility:     Supine to Sit: Supervision  Sit to Supine: Supervision     Transfers:  Sit to Stand: Supervision  Stand to Sit: Supervision    Balance:   Sitting: Intact  Standing: Impaired  Standing - Static: Fair  Standing - Dynamic : Fair  Ambulation/Gait Training:  Deferred d/t increased pain     Pain:R LE pain  Pain level pre-treatment: 9/10   Pain level post-treatment: 9/10   Pain Intervention(s) : Rest    Activity Tolerance:   Poor limited by pain    Please refer to the flowsheet for vital signs taken during this treatment. After treatment:   ?         Patient left in no apparent distress sitting up in chair  ? Patient left in no apparent distress in bed  ? Call bell left within reach  ? Nursing notified  ? Caregiver present  ? Bed alarm activated  ? SCDs applied    COMMUNICATION/EDUCATION:   ?         Role of Physical Therapy in the acute care setting. ?         Fall prevention education was provided and the patient/caregiver indicated understanding. ? Patient/family have participated as able in goal setting and plan of care. ?         Patient/family agree to work toward stated goals and plan of care. ?         Patient understands intent and goals of therapy, but is neutral about his/her participation. ? Patient is unable to participate in goal setting/plan of care: ongoing with therapy staff. ?         Other:     Thank you for this referral.  May Quinonez   Time Calculation: 25 mins      Eval Complexity: History: MEDIUM  Complexity : 1-2 comorbidities / personal factors will impact the outcome/ POC Exam:MEDIUM Complexity : 3 Standardized tests and measures addressing body structure, function, activity limitation and / or participation in recreation Presentation: MEDIUM Complexity : Evolving with changing characteristics  Clinical Decision Making:Medium Complexity Supervision for mobility  Overall Complexity:MEDIUM

## 2019-06-07 VITALS
WEIGHT: 158.5 LBS | RESPIRATION RATE: 16 BRPM | OXYGEN SATURATION: 98 % | BODY MASS INDEX: 28.08 KG/M2 | DIASTOLIC BLOOD PRESSURE: 72 MMHG | TEMPERATURE: 98.5 F | SYSTOLIC BLOOD PRESSURE: 114 MMHG | HEIGHT: 63 IN | HEART RATE: 74 BPM

## 2019-06-07 PROBLEM — S84.91XA: Status: ACTIVE | Noted: 2019-06-07

## 2019-06-07 PROCEDURE — 96375 TX/PRO/DX INJ NEW DRUG ADDON: CPT

## 2019-06-07 PROCEDURE — 96376 TX/PRO/DX INJ SAME DRUG ADON: CPT

## 2019-06-07 PROCEDURE — 74011250636 HC RX REV CODE- 250/636: Performed by: OBSTETRICS & GYNECOLOGY

## 2019-06-07 PROCEDURE — 74011250637 HC RX REV CODE- 250/637: Performed by: OBSTETRICS & GYNECOLOGY

## 2019-06-07 PROCEDURE — 97116 GAIT TRAINING THERAPY: CPT

## 2019-06-07 RX ORDER — OXYCODONE AND ACETAMINOPHEN 5; 325 MG/1; MG/1
1 TABLET ORAL
Qty: 30 TAB | Refills: 0 | Status: SHIPPED | OUTPATIENT
Start: 2019-06-07 | End: 2019-06-12

## 2019-06-07 RX ORDER — DOCUSATE SODIUM 100 MG/1
100 CAPSULE, LIQUID FILLED ORAL 2 TIMES DAILY
Qty: 60 CAP | Refills: 2 | Status: SHIPPED | OUTPATIENT
Start: 2019-06-07 | End: 2019-09-05

## 2019-06-07 RX ORDER — IBUPROFEN 800 MG/1
800 TABLET ORAL
Qty: 30 TAB | Refills: 0 | Status: SHIPPED | OUTPATIENT
Start: 2019-06-07

## 2019-06-07 RX ORDER — FERROUS SULFATE 325(65) MG
325 TABLET, DELAYED RELEASE (ENTERIC COATED) ORAL
Qty: 90 TAB | Refills: 3 | Status: SHIPPED | OUTPATIENT
Start: 2019-06-07

## 2019-06-07 RX ADMIN — ONDANSETRON 8 MG: 2 INJECTION INTRAMUSCULAR; INTRAVENOUS at 04:24

## 2019-06-07 RX ADMIN — OXYCODONE HYDROCHLORIDE AND ACETAMINOPHEN 1 TABLET: 5; 325 TABLET ORAL at 06:25

## 2019-06-07 RX ADMIN — SODIUM CHLORIDE, SODIUM LACTATE, POTASSIUM CHLORIDE, AND CALCIUM CHLORIDE 125 ML/HR: 600; 310; 30; 20 INJECTION, SOLUTION INTRAVENOUS at 04:19

## 2019-06-07 RX ADMIN — OXYCODONE HYDROCHLORIDE AND ACETAMINOPHEN 1 TABLET: 5; 325 TABLET ORAL at 12:47

## 2019-06-07 RX ADMIN — ONDANSETRON 8 MG: 2 INJECTION INTRAMUSCULAR; INTRAVENOUS at 12:59

## 2019-06-07 NOTE — PROGRESS NOTES
1950  Received bedside report from [facility]. Patient resting comfortably in bed. Call bell within reach. Denies any pain at this time. 2110  Patient complaining of pain in left wrist IV. Site slightly swollen and red. Removed IV from left wrist.  IV fluids running on right hand IV.    2220  Patient complaining of pain of 6. Gave Percocet as ordered. 0139  Patient sleeping.    0411 Patient up to the bathroom, felt nauseated after ambulation. Gave Zofran as ordered. No vomiting. Denies any pain at this time. 3803  Patient complaining of pain of 7 gave percocet as ordered.

## 2019-06-07 NOTE — DISCHARGE INSTRUCTIONS
Patient Education        Abdominal Hysterectomy: What to Expect at [facility] can expect to feel better and stronger each day, although you may need pain medicine for a week or two. You may get tired easily or have less energy than usual. This may last for several weeks after surgery. You will probably notice that your belly is swollen and puffy. This is common. The swelling will take several weeks to go down. It may take about 4 to 6 weeks to fully recover. It is important to avoid lifting while you are recovering so that you can heal.  This care sheet gives you a general idea about how long it will take for you to recover. But each person recovers at a different pace. Follow the steps below to get better as quickly as possible. How can you care for yourself at home? Activity    · Rest when you feel tired. Getting enough sleep will help you recover.     · Try to walk each day. Start by walking a little more than you did the day before. Bit by bit, increase the amount you walk. Walking boosts blood flow and helps prevent pneumonia and constipation.     · Avoid lifting anything that would make you strain. This may include a child, heavy grocery bags and milk containers, a heavy briefcase or backpack, cat litter or dog food bags, or a vacuum .     · Avoid strenuous activities, such as biking, jogging, weight lifting, or aerobic exercise, until your doctor says it is okay.     · You may shower. Pat the cut (incision) dry. Do not take a bath for the first 2 weeks, or until your doctor tells you it is okay.     · Ask your doctor when you can drive again.     · You will probably need to take 2 to 4 weeks off from work. It depends on the type of work you do and how you feel.     · Your doctor will tell you when you can have sex again. Diet    · You can eat your normal diet.  If your stomach is upset, try bland, low-fat foods like plain rice, broiled chicken, toast, and yogurt.     · Drink plenty of fluids (unless your doctor tells you not to).     · You may notice that your bowel movements are not regular right after your surgery. This is common. Try to avoid constipation and straining with bowel movements. You may want to take a fiber supplement every day. If you have not had a bowel movement after a couple of days, ask your doctor about taking a mild laxative. Medicines    · Your doctor will tell you if and when you can restart your medicines. He or she will also give you instructions about taking any new medicines.     · If you take blood thinners, such as warfarin (Coumadin), clopidogrel (Plavix), or aspirin, be sure to talk to your doctor. He or she will tell you if and when to start taking those medicines again. Make sure that you understand exactly what your doctor wants you to do.     · Be safe with medicines. Take pain medicines exactly as directed. ? If the doctor gave you a prescription medicine for pain, take it as prescribed. ? If you are not taking a prescription pain medicine, ask your doctor if you can take an over-the-counter medicine.     · If your doctor prescribed antibiotics, take them as directed. Do not stop taking them just because you feel better. You need to take the full course of antibiotics.     · If you think your pain medicine is making you sick to your stomach:  ? Take your medicine after meals (unless your doctor has told you not to). ? Ask your doctor for a different pain medicine. Incision care    · If you have strips of tape on the cut (incision) the doctor made, leave the tape on for a week or until it falls off. Or follow your doctor's instructions for removing the tape.     · Wash the area daily with warm, soapy water, and pat it dry. Don't use hydrogen peroxide or alcohol, which can slow healing. You may cover the area with a gauze bandage if it weeps or rubs against clothing. Change the bandage every day.     · Keep the area clean and dry.    Other instructions    · You may have some light vaginal bleeding. Wear sanitary pads if needed. Do not douche or use tampons. Follow-up care is a key part of your treatment and safety. Be sure to make and go to all appointments, and call your doctor if you are having problems. It's also a good idea to know your test results and keep a list of the medicines you take. When should you call for help? Call 911 anytime you think you may need emergency care. For example, call if:    · You passed out (lost consciousness).     · You have chest pain, are short of breath, or cough up blood.    Call your doctor now or seek immediate medical care if:    · You have pain that does not get better after you take pain medicine.     · You cannot pass stools or gas.     · You have vaginal discharge that has increased in amount or smells bad.     · You are sick to your stomach or cannot drink fluids.     · You have loose stitches, or your incision comes open.     · Bright red blood has soaked through the bandage over your incision.     · You have signs of infection, such as:  ? Increased pain, swelling, warmth, or redness. ? Red streaks leading from the incision. ? Pus draining from the incision. ? A fever.     · You have bright red vaginal bleeding that soaks one or more pads in an hour, or you have large clots.     · You have signs of a blood clot in your leg (called a deep vein thrombosis), such as:  ? Pain in your calf, back of the knee, thigh, or groin. ? Redness and swelling in your leg.    Watch closely for changes in your health, and be sure to contact your doctor if you have any problems. Where can you learn more? Go to http://sherrie-wally.info/. Enter M280 in the search box to learn more about \"Abdominal Hysterectomy: What to Expect at Home. \"  Current as of: May 14, 2018  Content Version: 11.9  © 1669-0966 [employer], [employer].  Care instructions adapted under license by [employer] (which disclaims liability or warranty for this information). If you have questions about a medical condition or this instruction, always ask your healthcare professional. [place] [address] any warranty or liability for your use of this information. No heavy lifting, nothing in vagina. Notify doctor of temp. 101       myalgias     Patient {ARMBANDS:20124}    DISCHARGE SUMMARY from Nurse    PATIENT INSTRUCTIONS:    After general anesthesia or intravenous sedation, for 24 hours or while taking prescription Narcotics:  · Limit your activities  · Do not drive and operate hazardous machinery  · Do not make important personal or business decisions  · Do  not drink alcoholic beverages  · If you have not urinated within 8 hours after discharge, please contact your surgeon on call. Report the following to your surgeon:  · Excessive pain, swelling, redness or odor of or around the surgical area  · Temperature over 100.5  · Nausea and vomiting lasting longer than 4 hours or if unable to take medications  · Any signs of decreased circulation or nerve impairment to extremity: change in color, persistent  numbness, tingling, coldness or increase pain  · Any questions    What to do at Home:  Recommended activity: {discharge activity:08440}, ***    If you experience any of the following symptoms ***, please follow up with ***. *  Please give a list of your current medications to your Primary Care Provider. *  Please update this list whenever your medications are discontinued, doses are      changed, or new medications (including over-the-counter products) are added. *  Please carry medication information at all times in case of emergency situations. These are general instructions for a healthy lifestyle:    No smoking/ No tobacco products/ Avoid exposure to second hand smoke  Surgeon General's Warning:  Quitting smoking now greatly reduces serious risk to your health.     Obesity, smoking, and sedentary lifestyle greatly increases your risk for illness    A healthy diet, regular physical exercise & weight monitoring are important for maintaining a healthy lifestyle    You may be retaining fluid if you have a history of heart failure or if you experience any of the following symptoms:  Weight gain of 3 pounds or more overnight or 5 pounds in a week, increased swelling in our hands or feet or shortness of breath while lying flat in bed. Please call your doctor as soon as you notice any of these symptoms; do not wait until your next office visit. Recognize signs and symptoms of STROKE:    F-face looks uneven    A-arms unable to move or move unevenly    S-speech slurred or non-existent    T-time-call 911 as soon as signs and symptoms begin-DO NOT go       Back to bed or wait to see if you get better-TIME IS BRAIN. Warning Signs of HEART ATTACK     Call 911 if you have these symptoms:   Chest discomfort. Most heart attacks involve discomfort in the center of the chest that lasts more than a few minutes, or that goes away and comes back. It can feel like uncomfortable pressure, squeezing, fullness, or pain.  Discomfort in other areas of the upper body. Symptoms can include pain or discomfort in one or both arms, the back, neck, jaw, or stomach.  Shortness of breath with or without chest discomfort.  Other signs may include breaking out in a cold sweat, nausea, or lightheadedness. Don't wait more than five minutes to call 911 - MINUTES MATTER! Fast action can save your life. Calling 911 is almost always the fastest way to get lifesaving treatment. Emergency Medical Services staff can begin treatment when they arrive -- up to an hour sooner than if someone gets to the hospital by car. The discharge information has been reviewed with the {PATIENT PARENT GUARDIAN:31609}. The {PATIENT PARENT GUARDIAN:45203} verbalized understanding.   Discharge medications reviewed with the {Dishcarge meds reviewed LQJJ:50119} and appropriate educational materials and side effects teaching were provided.   ___________________________________________________________________________________________________________________________________

## 2019-06-07 NOTE — PROGRESS NOTES
Problem: Mobility Impaired (Adult and Pediatric)  Goal: *Acute Goals and Plan of Care (Insert Text)  Description  Physical Therapy Goals   Initiated 6/6/2019 and to be accomplished within 7 days. 1.  Patient will ambulate 300 feet with independence using LRAD in order to prepare for safe negotiation of their environment. Outcome: Progressing Towards Goal   PHYSICAL THERAPY TREATMENT AND DISCHARGE    Patient: Dannielle Feliciano (89 y.o. female)  Date: 6/7/2019  Diagnosis: S/P hysterectomy [Z90.710] <principal problem not specified>  Procedure(s) (LRB):  Robotic Total laparoscopic hysterectomy, Bilateral Salpingectomy observational cystoscopy (N/A) 2 Days Post-Op  Precautions:    PLOF: Independent     ASSESSMENT:  Patient supine in bed, agreeable to participation with PT. Patient reporting improvement in R LE symptoms. Supervision for supine <> sit and sit <> stand. Improved activity tolerance. Patient able to ambulate x 5 ft towards restroom at supervision level. Ambulating with steppage gait on R. Independent for toileting. Able to able to ambulate an additional 45 ft without AD. Using IV pole for support for first 30 ft of ambulation. Patient able to ambulate 15 ft without IV pole for support but occasionally reaching for furniture for stability. Patient educated on use of SPC temporarily for steady gait and to reduce risk of falls. Per patient she does not have at home but verbalizing that she will be staying with family for a few days while she recovers as she has stairs at her own home. Patient educated on safety with stair negotiation. Patient left supine in bed with all needs within reach. Recommend d/c home with outpatient PT as indicated to maximize safety with gait. Recommend use of SPC for safety to reduce risk of falls. PLAN:  Maximum therapeutic gains met at current level of care and patient will be discharged from physical therapy at this time. Rationale for discharge:  ? Goals Achieved  ? Plateau Reached  ? Patient not participating in therapy  ? Other: patient pending d/c  Discharge Recommendations:  Outpatient  Further Equipment Recommendations for Discharge:  straight cane     SUBJECTIVE:   Patient stated I can feel my lower leg now it's just in my thigh.     OBJECTIVE DATA SUMMARY:   Critical Behavior:  Neurologic State: Alert  Orientation Level: Oriented X4  Cognition: Appropriate for age attention/concentration     Functional Mobility Training:  Bed Mobility:  Supervision for supine <> sit      Transfers:  Sit to Stand: Supervision  Stand to Sit: Supervision    Balance:  Sitting: Intact  Standing: Impaired  Standing - Static: Good  Standing - Dynamic : Fair     Ambulation/Gait Training:  Distance (ft): 50 Feet (ft)  Assistive Device: (None)  Ambulation - Level of Assistance: Supervision     Gait Description (WDL): Exceptions to WDL  Gait Abnormalities: Steppage gait        Base of Support: Center of gravity altered     Speed/Jackelin: Slow  Step Length: Right shortened  Swing Pattern: Right asymmetrical     Pain:R LE pain   Pain level pre-treatment: 5/10   Pain level post-treatment: 5/10   Pain Intervention(s): Rest    Activity Tolerance:   Fair    Please refer to the flowsheet for vital signs taken during this treatment. After treatment:   ? Patient left in no apparent distress sitting up in chair  ? Patient left in no apparent distress in bed  ? Call bell left within reach  ? Nursing notified  ? Caregiver present  ? Bed alarm activated  ? SCDs applied      COMMUNICATION/EDUCATION:   ?         Role of Physical Therapy in the acute care setting. ?         Fall prevention education was provided and the patient/caregiver indicated understanding. ? Patient/family have participated as able and agree with findings and recommendations. ?         Patient is unable to participate in plan of care at this time. ?          Other:        Ian Primes   Elizabeth Calculation: 35 mins

## 2019-06-07 NOTE — PROGRESS NOTES
Problem: Discharge Planning  Goal: *Discharge to safe environment  Outcome: Resolved/Met       Home    Care Management Interventions  PCP Verified by CM: Yes(Diamante Goddard NP / Dr. Amol Aggarwal)  Last Visit to PCP: 05/28/19  Palliative Care Criteria Met (RRAT>21 & CHF Dx)?: No  Mode of Transport at Discharge: Other (see comment)  Transition of Care Consult (CM Consult): Discharge Planning  Discharge Durable Medical Equipment: No  Physical Therapy Consult: Yes  Occupational Therapy Consult: No  Speech Therapy Consult: No  Current Support Network:  Other(23yr old dtr lives with pt)  Confirm Follow Up Transport: Family  Plan discussed with Pt/Family/Caregiver: Yes  Discharge Location  Discharge Placement: Home

## 2019-06-07 NOTE — PROGRESS NOTES
Bedside shift change report given to [address] [place] [address] (oncoming nurse) by Severo Vides (offgoing nurse). Report included the following information SBAR, Kardex, Intake/Output and MAR.

## 2019-06-07 NOTE — DISCHARGE SUMMARY
Discharge Summary     Name: Jacoby Salcedo MRN: [id number]  SSN: [id number]    YOB: 1970  Age: 50 y.o. Sex: female      Allergies: Patient has no known allergies. Admit Date: 6/5/2019    Discharge Date: 6/7/2019      Admitting Physician: Yamini Menjivar MD     * Admission Diagnoses: Anemia, Fibroids and Menorrhagia    * Discharge Diagnoses:   Hospital Problems as of 6/7/2019 Date Reviewed: 6/7/2019          Codes Class Noted - Resolved POA    Neuropraxia of right lower extremity ICD-10-CM: S84.91XA  ICD-9-CM: 956.9  6/7/2019 - Present Unknown        S/P hysterectomy ICD-10-CM: Z90.710  ICD-9-CM: V88.01  6/5/2019 - Present Unknown               * Procedures: Laparoscopic Assisted Vaginal Hysterectomy with salpingectomy; observational cystoscopy    * Discharge Condition: [facility] ([place], [place]) Course: Hospital course was complicated by neurpraxia. Seen by PT, who thought it would resolve  Spontaneously. Also noted anemia- pt not symptomatic. Significant Diagnostic Studies: No results found for this or any previous visit (from the past 24 hour(s)). * Disposition: Home    Discharge Medications:   Current Discharge Medication List      START taking these medications    Details   oxyCODONE-acetaminophen (PERCOCET) 5-325 mg per tablet Take 1 Tab by mouth every four (4) hours as needed for Pain for up to 5 days. Max Daily Amount: 6 Tabs. Indications: Pain  Qty: 30 Tab, Refills: 0    Associated Diagnoses: S/P hysterectomy; Neurapraxia of right lower extremity, initial encounter      docusate sodium (COLACE) 100 mg capsule Take 1 Cap by mouth two (2) times a day for 90 days. Qty: 60 Cap, Refills: 2      ibuprofen (MOTRIN) 800 mg tablet Take 1 Tab by mouth every eight (8) hours as needed for Pain. Indications: Pain  Qty: 30 Tab, Refills: 0      ferrous sulfate (IRON) 325 mg (65 mg iron) EC tablet Take 1 Tab by mouth three (3) times daily (with meals).   Qty: 90 Tab, Refills: 3         CONTINUE these medications which have NOT CHANGED    Details   amLODIPine (NORVASC) 5 mg tablet Take 1 Tab by mouth daily. Qty: 30 Tab, Refills: 0         STOP taking these medications       medroxyPROGESTERone (PROVERA) 10 mg tablet Comments:   Reason for Stopping:                * Follow-up Care/Patient Instructions: Activity: No sex, douching, or tampons for 6 weeks or as directed by your physician. No heavy lifting for 6 weeks. No driving while taking pain medication. Diet: Resume pre-hospital diet  Wound Care: Keep wound clean and dry and As directed    Follow-up Information     Follow up With Specialties Details Why Contact Info    Feliciano Hernandez NP [address] [phone]             F/u in the office in 2 weeks.

## 2019-06-07 NOTE — PROGRESS NOTES
Bedside shift report received from  [facility] with joann Calvillo in to see patient  [phone] percocet for pain  zofran given for nausea

## 2019-06-07 NOTE — PROGRESS NOTES
Gynecology Progress Note    Patient doing well post-op day 2 from Procedure(s):  Robotic Total laparoscopic hysterectomy, Bilateral Salpingectomy observational cystoscopy without significant complaints. Pain controlled on current medication. Voiding without difficulty. Patient is passing flatus. She continues to have left leg paraesthesia and has been seen by PT. She is ambulatory and otherwise doing well. She denies any anemia symptoms. Vitals:  Blood pressure 114/72, pulse 74, temperature 98.5 °F (36.9 °C), resp. rate 16, height 5' 3\" (1.6 m), weight 158 lb 8 oz (71.9 kg), SpO2 98 %. Temp (24hrs), Av.4 °F (36.9 °C), Min:98 °F (36.7 °C), Max:99.3 °F (37.4 °C)        Exam:  Patient without distress. Abdomen soft,  nontender. Incision dry and clean without erythema. Lower extremities are negative for swelling, cords, or tenderness. Lab/Data Review: All lab results for the last 24 hours reviewed. Assessment and Plan:  Patient appears to be having uncomplicated post Procedure(s):  Robotic Total laparoscopic hysterectomy, Bilateral Salpingectomy observational cystoscopy course. Continue routine post-op care. 1. Discharge home pending final assessment of PT.  2. Pt is not symptomatic at this H/H and has no need for transfusion.

## 2019-06-07 NOTE — PROGRESS NOTES
0813: PT tx session held this AM d/t low H/H /6.9/23.5). Will f/u with patient pending stability of labs. Kyra Mosqueda PT, DPT Office extension: [phone]

## 2019-06-07 NOTE — PROGRESS NOTES
Problem: Falls - Risk of  Goal: *Absence of Falls  Description  Document Tia Manus Fall Risk and appropriate interventions in the flowsheet. Outcome: Progressing Towards Goal     Problem: Patient Education: Go to Patient Education Activity  Goal: Patient/Family Education  Outcome: Progressing Towards Goal     Problem: Pressure Injury - Risk of  Goal: *Prevention of pressure injury  Description  Document Denny Scale and appropriate interventions in the flowsheet.   Outcome: Progressing Towards Goal     Problem: Patient Education: Go to Patient Education Activity  Goal: Patient/Family Education  Outcome: Progressing Towards Goal     Problem: Pain  Goal: *Control of Pain  Outcome: Progressing Towards Goal     Problem: Patient Education: Go to Patient Education Activity  Goal: Patient/Family Education  Outcome: Progressing Towards Goal     Problem: Infection - Risk of, Surgical Site Infection  Goal: *Absence of surgical site infection signs and symptoms  Outcome: Progressing Towards Goal     Problem: Patient Education: Go to Patient Education Activity  Goal: Patient/Family Education  Outcome: Progressing Towards Goal

## 2019-06-12 ENCOUNTER — TELEPHONE (OUTPATIENT)
Dept: OBGYN CLINIC | Age: 49
End: 2019-06-12

## 2019-06-12 NOTE — TELEPHONE ENCOUNTER
Franc Burton called because she is a post op patient that is having vaginal pains and burning when she uses the bathroom . I wanted to put her on the schedule for tomorrow ,  However she states she is in too much pain to leave the house . Her 2 week post op is scheduled for June 19th . Please advise. Thank you .

## 2019-06-13 ENCOUNTER — OFFICE VISIT (OUTPATIENT)
Dept: OBGYN CLINIC | Age: 49
End: 2019-06-13

## 2019-06-13 VITALS
BODY MASS INDEX: 27.64 KG/M2 | WEIGHT: 156 LBS | DIASTOLIC BLOOD PRESSURE: 78 MMHG | HEART RATE: 87 BPM | TEMPERATURE: 98.1 F | HEIGHT: 63 IN | SYSTOLIC BLOOD PRESSURE: 123 MMHG

## 2019-06-13 DIAGNOSIS — R30.9 URINARY PAIN: Primary | ICD-10-CM

## 2019-06-13 LAB
BILIRUB UR QL STRIP: NORMAL
GLUCOSE UR-MCNC: NEGATIVE MG/DL
KETONES P FAST UR STRIP-MCNC: NORMAL MG/DL
PH UR STRIP: 5.5 [PH] (ref 4.6–8)
PROT UR QL STRIP: NORMAL
SP GR UR STRIP: 1.02 (ref 1–1.03)
UA UROBILINOGEN AMB POC: NORMAL (ref 0.2–1)
URINALYSIS CLARITY POC: CLEAR
URINALYSIS COLOR POC: NORMAL
URINE BLOOD POC: NEGATIVE
URINE LEUKOCYTES POC: NORMAL
URINE NITRITES POC: NEGATIVE

## 2019-06-13 RX ORDER — CIPROFLOXACIN 500 MG/1
500 TABLET ORAL 2 TIMES DAILY
Qty: 6 TAB | Refills: 0 | Status: SHIPPED | OUTPATIENT
Start: 2019-06-13 | End: 2019-06-16

## 2019-06-13 NOTE — PROGRESS NOTES
51 Y/o  presents for pain with urination. She states she had excruciating pain with urination. She also notes vaginal bleeding for 1 day and notes she has had increased activity and was walking a lot. Visit Vitals  /78 (BP 1 Location: Left arm, BP Patient Position: Sitting)   Pulse 87   Temp 98.1 °F (36.7 °C) (Oral)   Ht 5' 3\" (1.6 m)   Wt 156 lb (70.8 kg)   BMI 27.63 kg/m²     Gen:A&Ox3, NAD  SVE:  Noted mild left labial swelling. Noted pink discharge in vault. Cuff intact. Appropriately TTP    U/A;: +leuk  A/P  Possible UTI. Rx sent for antibiotics. Discussed appropriate post-operative instructions.

## 2019-06-15 LAB — BACTERIA UR CULT: NORMAL

## 2019-06-19 ENCOUNTER — OFFICE VISIT (OUTPATIENT)
Dept: OBGYN CLINIC | Age: 49
End: 2019-06-19

## 2019-06-19 DIAGNOSIS — R10.2 PELVIC PAIN: Primary | ICD-10-CM

## 2019-06-19 LAB
BILIRUB UR QL STRIP: NEGATIVE
GLUCOSE UR-MCNC: NEGATIVE MG/DL
KETONES P FAST UR STRIP-MCNC: NEGATIVE MG/DL
PH UR STRIP: 5.5 [PH] (ref 4.6–8)
PROT UR QL STRIP: NORMAL
SP GR UR STRIP: 1.01 (ref 1–1.03)
UA UROBILINOGEN AMB POC: NORMAL (ref 0.2–1)
URINALYSIS CLARITY POC: CLEAR
URINALYSIS COLOR POC: YELLOW
URINE BLOOD POC: NORMAL
URINE LEUKOCYTES POC: NORMAL
URINE NITRITES POC: NEGATIVE

## 2019-06-20 VITALS — RESPIRATION RATE: 18 BRPM | TEMPERATURE: 98 F | WEIGHT: 155 LBS | HEIGHT: 63 IN | BODY MASS INDEX: 27.46 KG/M2

## 2019-06-29 ENCOUNTER — HOSPITAL ENCOUNTER (EMERGENCY)
Age: 49
Discharge: HOME | End: 2019-06-29
Attending: EMERGENCY MEDICINE
Payer: MEDICAID

## 2019-06-29 ENCOUNTER — APPOINTMENT (OUTPATIENT)
Dept: CT IMAGING | Age: 49
End: 2019-06-29
Attending: EMERGENCY MEDICINE
Payer: MEDICAID

## 2019-06-29 VITALS
TEMPERATURE: 98.4 F | OXYGEN SATURATION: 100 % | HEART RATE: 79 BPM | BODY MASS INDEX: 27.82 KG/M2 | WEIGHT: 157 LBS | HEIGHT: 63 IN | SYSTOLIC BLOOD PRESSURE: 125 MMHG | RESPIRATION RATE: 12 BRPM | DIASTOLIC BLOOD PRESSURE: 83 MMHG

## 2019-06-29 DIAGNOSIS — N99.842 POSTOPERATIVE SEROMA INVOLVING GENITOURINARY SYSTEM AFTER GENITOURINARY PROCEDURE: Primary | ICD-10-CM

## 2019-06-29 DIAGNOSIS — S84.91XD NEURAPRAXIA OF RIGHT LOWER EXTREMITY, SUBSEQUENT ENCOUNTER: ICD-10-CM

## 2019-06-29 LAB
ALBUMIN SERPL-MCNC: 3.6 G/DL (ref 3.4–5)
ALBUMIN/GLOB SERPL: 0.9 {RATIO} (ref 0.8–1.7)
ALP SERPL-CCNC: 91 U/L (ref 45–117)
ALT SERPL-CCNC: 18 U/L (ref 13–56)
ANION GAP SERPL CALC-SCNC: 7 MMOL/L (ref 3–18)
APPEARANCE UR: CLEAR
AST SERPL-CCNC: 14 U/L (ref 15–37)
BASOPHILS # BLD: 0.2 K/UL (ref 0–0.1)
BASOPHILS NFR BLD: 2 % (ref 0–2)
BILIRUB SERPL-MCNC: 0.2 MG/DL (ref 0.2–1)
BILIRUB UR QL: NEGATIVE
BUN SERPL-MCNC: 8 MG/DL (ref 7–18)
BUN/CREAT SERPL: 11 (ref 12–20)
CALCIUM SERPL-MCNC: 9.1 MG/DL (ref 8.5–10.1)
CHLORIDE SERPL-SCNC: 106 MMOL/L (ref 100–108)
CO2 SERPL-SCNC: 29 MMOL/L (ref 21–32)
COLOR UR: YELLOW
CREAT SERPL-MCNC: 0.72 MG/DL (ref 0.6–1.3)
DIFFERENTIAL METHOD BLD: ABNORMAL
EOSINOPHIL # BLD: 0.4 K/UL (ref 0–0.4)
EOSINOPHIL NFR BLD: 5 % (ref 0–5)
ERYTHROCYTE [DISTWIDTH] IN BLOOD BY AUTOMATED COUNT: 20 % (ref 11.6–14.5)
GLOBULIN SER CALC-MCNC: 3.9 G/DL (ref 2–4)
GLUCOSE SERPL-MCNC: 79 MG/DL (ref 74–99)
GLUCOSE UR STRIP.AUTO-MCNC: NEGATIVE MG/DL
HCT VFR BLD AUTO: 29.8 % (ref 35–45)
HGB BLD-MCNC: 8.7 G/DL (ref 12–16)
HGB UR QL STRIP: NEGATIVE
KETONES UR QL STRIP.AUTO: NEGATIVE MG/DL
LEUKOCYTE ESTERASE UR QL STRIP.AUTO: NEGATIVE
LYMPHOCYTES # BLD: 2.4 K/UL (ref 0.9–3.6)
LYMPHOCYTES NFR BLD: 28 % (ref 21–52)
MCH RBC QN AUTO: 20.2 PG (ref 24–34)
MCHC RBC AUTO-ENTMCNC: 29.2 G/DL (ref 31–37)
MCV RBC AUTO: 69.3 FL (ref 74–97)
MONOCYTES # BLD: 0.8 K/UL (ref 0.05–1.2)
MONOCYTES NFR BLD: 9 % (ref 3–10)
NEUTS SEG # BLD: 4.6 K/UL (ref 1.8–8)
NEUTS SEG NFR BLD: 56 % (ref 40–73)
NITRITE UR QL STRIP.AUTO: NEGATIVE
PH UR STRIP: 6.5 [PH] (ref 5–8)
PLATELET # BLD AUTO: 477 K/UL (ref 135–420)
PLATELET COMMENTS,PCOM: ABNORMAL
PMV BLD AUTO: 10.6 FL (ref 9.2–11.8)
POTASSIUM SERPL-SCNC: 3.7 MMOL/L (ref 3.5–5.5)
PROT SERPL-MCNC: 7.5 G/DL (ref 6.4–8.2)
PROT UR STRIP-MCNC: NEGATIVE MG/DL
RBC # BLD AUTO: 4.3 M/UL (ref 4.2–5.3)
RBC MORPH BLD: ABNORMAL
SODIUM SERPL-SCNC: 142 MMOL/L (ref 136–145)
SP GR UR REFRACTOMETRY: 1.01 (ref 1–1.03)
UROBILINOGEN UR QL STRIP.AUTO: 1 EU/DL (ref 0.2–1)
WBC # BLD AUTO: 8.4 K/UL (ref 4.6–13.2)

## 2019-06-29 PROCEDURE — 96374 THER/PROPH/DIAG INJ IV PUSH: CPT

## 2019-06-29 PROCEDURE — 81003 URINALYSIS AUTO W/O SCOPE: CPT

## 2019-06-29 PROCEDURE — 96361 HYDRATE IV INFUSION ADD-ON: CPT

## 2019-06-29 PROCEDURE — 80053 COMPREHEN METABOLIC PANEL: CPT

## 2019-06-29 PROCEDURE — 96375 TX/PRO/DX INJ NEW DRUG ADDON: CPT

## 2019-06-29 PROCEDURE — 74011636320 HC RX REV CODE- 636/320: Performed by: EMERGENCY MEDICINE

## 2019-06-29 PROCEDURE — 74011250636 HC RX REV CODE- 250/636: Performed by: EMERGENCY MEDICINE

## 2019-06-29 PROCEDURE — 74177 CT ABD & PELVIS W/CONTRAST: CPT

## 2019-06-29 PROCEDURE — 85025 COMPLETE CBC W/AUTO DIFF WBC: CPT

## 2019-06-29 PROCEDURE — 99283 EMERGENCY DEPT VISIT LOW MDM: CPT

## 2019-06-29 RX ORDER — MORPHINE SULFATE 2 MG/ML
4 INJECTION, SOLUTION INTRAMUSCULAR; INTRAVENOUS ONCE
Status: COMPLETED | OUTPATIENT
Start: 2019-06-29 | End: 2019-06-29

## 2019-06-29 RX ORDER — HYDROCODONE BITARTRATE AND ACETAMINOPHEN 5; 325 MG/1; MG/1
1 TABLET ORAL
Qty: 6 TAB | Refills: 0 | Status: SHIPPED | OUTPATIENT
Start: 2019-06-29 | End: 2019-07-02

## 2019-06-29 RX ORDER — ONDANSETRON 2 MG/ML
4 INJECTION INTRAMUSCULAR; INTRAVENOUS
Status: COMPLETED | OUTPATIENT
Start: 2019-06-29 | End: 2019-06-29

## 2019-06-29 RX ORDER — KETOROLAC TROMETHAMINE 30 MG/ML
15 INJECTION, SOLUTION INTRAMUSCULAR; INTRAVENOUS
Status: COMPLETED | OUTPATIENT
Start: 2019-06-29 | End: 2019-06-29

## 2019-06-29 RX ADMIN — IOPAMIDOL 100 ML: 612 INJECTION, SOLUTION INTRAVENOUS at 22:14

## 2019-06-29 RX ADMIN — SODIUM CHLORIDE 1000 ML: 900 INJECTION, SOLUTION INTRAVENOUS at 21:44

## 2019-06-29 RX ADMIN — KETOROLAC TROMETHAMINE 15 MG: 30 INJECTION, SOLUTION INTRAMUSCULAR at 23:20

## 2019-06-29 RX ADMIN — MORPHINE SULFATE 4 MG: 2 INJECTION, SOLUTION INTRAMUSCULAR; INTRAVENOUS at 21:42

## 2019-06-29 RX ADMIN — ONDANSETRON 4 MG: 2 INJECTION INTRAMUSCULAR; INTRAVENOUS at 21:42

## 2019-06-30 NOTE — ED PROVIDER NOTES
EMERGENCY DEPARTMENT HISTORY AND PHYSICAL EXAM    9:23 PM      Date: 6/29/2019  Patient Name: Reed Hunt    History of Presenting Illness     Chief Complaint   Patient presents with    Numbness    Abdominal Pain         History Provided By: Patient      Additional History (Context): Reed Hunt is a 50 y.o. female who presents with pelvic pain right-sided worse than left patient is status post total abdominal hysterectomy 2/5 with resultant right lower extremity neuropraxia patient states that she had fallen on Wednesday because of increased numbness that continues to have numbness to the right leg as well as now increased pain to her abdomen since the fall she denies any nausea vomiting denies any fevers or chills. PCP: Benjy Wilson NP      Current Facility-Administered Medications   Medication Dose Route Frequency Provider Last Rate Last Dose    ketorolac (TORADOL) injection 15 mg  15 mg IntraVENous NOW Myla Butler MD         Current Outpatient Medications   Medication Sig Dispense Refill    HYDROcodone-acetaminophen (NORCO) 5-325 mg per tablet Take 1 Tab by mouth every four (4) hours as needed for Pain for up to 3 days. Max Daily Amount: 6 Tabs. 6 Tab 0    docusate sodium (COLACE) 100 mg capsule Take 1 Cap by mouth two (2) times a day for 90 days. 60 Cap 2    ibuprofen (MOTRIN) 800 mg tablet Take 1 Tab by mouth every eight (8) hours as needed for Pain. Indications: Pain 30 Tab 0    ferrous sulfate (IRON) 325 mg (65 mg iron) EC tablet Take 1 Tab by mouth three (3) times daily (with meals). 90 Tab 3    amLODIPine (NORVASC) 5 mg tablet Take 1 Tab by mouth daily.  27 Tab 0       Past History     Past Medical History:  Past Medical History:   Diagnosis Date    Essential hypertension 5/21/2019    Iron deficiency anemia due to chronic blood loss 5/21/2019    Low iron     Neuropraxia of right lower extremity 6/7/2019       Past Surgical History:  Past Surgical History:   Procedure Laterality Date    HX  SECTION      HX TOTAL LAPAROSCOPIC HYSTERECTOMY         Family History:  Family History   Problem Relation Age of Onset    Breast Cancer Mother     Breast Cancer Sister     No Known Problems Father        Social History:  Social History     Tobacco Use    Smoking status: Never Smoker    Smokeless tobacco: Never Used   Substance Use Topics    Alcohol use: Yes     Frequency: Monthly or less     Drinks per session: 3 or 4     Binge frequency: Less than monthly     Comment: occasional    Drug use: No       Allergies:  No Known Allergies      Review of Systems       Review of Systems   Constitutional: Negative for activity change, chills, diaphoresis and fever. HENT: Negative for congestion. Eyes: Negative for visual disturbance. Respiratory: Negative for cough, chest tightness and shortness of breath. Cardiovascular: Negative for chest pain. Gastrointestinal: Positive for abdominal pain. Negative for diarrhea, nausea and vomiting. Genitourinary: Negative for dysuria, flank pain, vaginal bleeding and vaginal discharge. Musculoskeletal: Negative for back pain. Skin: Negative for rash. Neurological: Negative for dizziness, syncope and weakness. All other systems reviewed and are negative. Physical Exam     Visit Vitals  /83 (BP 1 Location: Right arm, BP Patient Position: Sitting)   Pulse 79   Temp 98.4 °F (36.9 °C)   Resp 12   Ht 5' 3\" (1.6 m)   Wt 71.2 kg (157 lb)   SpO2 100%   BMI 27.81 kg/m²       Physical Exam   Constitutional: She is oriented to person, place, and time. She appears well-developed and well-nourished. No distress. HENT:   Head: Normocephalic and atraumatic. Mouth/Throat: Oropharynx is clear and moist.   Eyes: Pupils are equal, round, and reactive to light. Conjunctivae and EOM are normal. No scleral icterus. Neck: Normal range of motion. Neck supple. Cardiovascular: Normal rate, regular rhythm and normal heart sounds. No murmur heard. Pulmonary/Chest: Effort normal and breath sounds normal. No respiratory distress. Abdominal: Soft. Bowel sounds are normal. She exhibits distension. There is tenderness. Mild diffuse abdominal tenderness right greater than left   Musculoskeletal: She exhibits no edema. Lymphadenopathy:     She has no cervical adenopathy. Neurological: She is alert and oriented to person, place, and time. Coordination normal.   Patient complains of decreased sensation in the right anterior portion of her leg although motor appears to be intact patient is able to ambulate slowly in the emergency department   Skin: Skin is warm and dry. No rash noted. Psychiatric: She has a normal mood and affect. Her behavior is normal.   Nursing note and vitals reviewed. Diagnostic Study Results     Labs -  Recent Results (from the past 12 hour(s))   CBC WITH AUTOMATED DIFF    Collection Time: 06/29/19  9:30 PM   Result Value Ref Range    WBC 8.4 4.6 - 13.2 K/uL    RBC 4.30 4.20 - 5.30 M/uL    HGB 8.7 (L) 12.0 - 16.0 g/dL    HCT 29.8 (L) 35.0 - 45.0 %    MCV 69.3 (L) 74.0 - 97.0 FL    MCH 20.2 (L) 24.0 - 34.0 PG    MCHC 29.2 (L) 31.0 - 37.0 g/dL    RDW 20.0 (H) 11.6 - 14.5 %    PLATELET 540 (H) 667 - 420 K/uL    MPV 10.6 9.2 - 11.8 FL    NEUTROPHILS 56 40 - 73 %    LYMPHOCYTES 28 21 - 52 %    MONOCYTES 9 3 - 10 %    EOSINOPHILS 5 0 - 5 %    BASOPHILS 2 0 - 2 %    ABS. NEUTROPHILS 4.6 1.8 - 8.0 K/UL    ABS. LYMPHOCYTES 2.4 0.9 - 3.6 K/UL    ABS. MONOCYTES 0.8 0.05 - 1.2 K/UL    ABS. EOSINOPHILS 0.4 0.0 - 0.4 K/UL    ABS.  BASOPHILS 0.2 (H) 0.0 - 0.1 K/UL    PLATELET COMMENTS Increased Platelets      RBC COMMENTS ANISOCYTOSIS  2+        RBC COMMENTS MICROCYTOSIS  1+        RBC COMMENTS TARGET CELLS  1+        DF MANUAL     METABOLIC PANEL, COMPREHENSIVE    Collection Time: 06/29/19  9:30 PM   Result Value Ref Range    Sodium 142 136 - 145 mmol/L    Potassium 3.7 3.5 - 5.5 mmol/L    Chloride 106 100 - 108 mmol/L CO2 29 21 - 32 mmol/L    Anion gap 7 3.0 - 18 mmol/L    Glucose 79 74 - 99 mg/dL    BUN 8 7.0 - 18 MG/DL    Creatinine 0.72 0.6 - 1.3 MG/DL    BUN/Creatinine ratio 11 (L) 12 - 20      GFR est AA >60 >60 ml/min/1.73m2    GFR est non-AA >60 >60 ml/min/1.73m2    Calcium 9.1 8.5 - 10.1 MG/DL    Bilirubin, total 0.2 0.2 - 1.0 MG/DL    ALT (SGPT) 18 13 - 56 U/L    AST (SGOT) 14 (L) 15 - 37 U/L    Alk. phosphatase 91 45 - 117 U/L    Protein, total 7.5 6.4 - 8.2 g/dL    Albumin 3.6 3.4 - 5.0 g/dL    Globulin 3.9 2.0 - 4.0 g/dL    A-G Ratio 0.9 0.8 - 1.7     URINALYSIS W/ RFLX MICROSCOPIC    Collection Time: 06/29/19  9:30 PM   Result Value Ref Range    Color YELLOW      Appearance CLEAR      Specific gravity 1.012 1.005 - 1.030      pH (UA) 6.5 5.0 - 8.0      Protein NEGATIVE  NEG mg/dL    Glucose NEGATIVE  NEG mg/dL    Ketone NEGATIVE  NEG mg/dL    Bilirubin NEGATIVE  NEG      Blood NEGATIVE  NEG      Urobilinogen 1.0 0.2 - 1.0 EU/dL    Nitrites NEGATIVE  NEG      Leukocyte Esterase NEGATIVE  NEG         Radiologic Studies -   CT ABD PELV W CONT    (Results Pending)         Medical Decision Making   I am the first provider for this patient. I reviewed the vital signs, available nursing notes, past medical history, past surgical history, family history and social history. Vital Signs-Reviewed the patient's vital signs.       EKG:    Records Reviewed: Nursing Notes and Old Medical Records (Time of Review: 9:23 PM)    ED Course: Progress Notes, Reevaluation, and Consults:      Provider Notes (Medical Decision Making):   MDM  Number of Diagnoses or Management Options  Diagnosis management comments: Increased pain after falling now 2 weeks status post total abdominal hysterectomy will CT rule out occult injury continued right lower extremity neuropraxia       Amount and/or Complexity of Data Reviewed  Clinical lab tests: ordered  Tests in the radiology section of CPT®: ordered                Diagnosis     Clinical Impression:   1. Postoperative seroma involving genitourinary system after genitourinary procedure    2. Neurapraxia of right lower extremity, subsequent encounter        Disposition: home     Follow-up Information     Follow up With Specialties Details Why [facility] EMERGENCY DEPT Emergency Medicine  As needed, If symptoms worsen [address]  [place] [address]    Bowen Han MD Obstetrics & Gynecology, Gynecology, Obstetrics Call to be seen next week. [facility]  [address] [id number]  [phone]             Patient's Medications   Start Taking    HYDROCODONE-ACETAMINOPHEN (NORCO) 5-325 MG PER TABLET    Take 1 Tab by mouth every four (4) hours as needed for Pain for up to 3 days. Max Daily Amount: 6 Tabs. Continue Taking    AMLODIPINE (NORVASC) 5 MG TABLET    Take 1 Tab by mouth daily. DOCUSATE SODIUM (COLACE) 100 MG CAPSULE    Take 1 Cap by mouth two (2) times a day for 90 days. FERROUS SULFATE (IRON) 325 MG (65 MG IRON) EC TABLET    Take 1 Tab by mouth three (3) times daily (with meals). IBUPROFEN (MOTRIN) 800 MG TABLET    Take 1 Tab by mouth every eight (8) hours as needed for Pain. Indications: Pain   These Medications have changed    No medications on file   Stop Taking    No medications on file     _______________________________    Please note that this dictation was completed with [employer], the computer voice recognition software. Quite often unanticipated grammatical, syntax, homophones, and other interpretive errors are inadvertently transcribed by the computer software. Please disregard these errors. Please excuse any errors that have escaped final proofreading.

## 2019-06-30 NOTE — ED TRIAGE NOTES
Ambulatory to triage. Patient states she had a total hysterectomy on 6/5/19. C/o intermittent numbness to RLE since procedure. States she had a fall 3 days ago and today. \"My leg gave out and I fell on my right side. The right side of my stomach hurts. \"

## 2019-07-01 ENCOUNTER — TELEPHONE (OUTPATIENT)
Dept: FAMILY MEDICINE CLINIC | Age: 49
End: 2019-07-01

## 2019-07-03 ENCOUNTER — TELEPHONE (OUTPATIENT)
Dept: FAMILY MEDICINE CLINIC | Age: 49
End: 2019-07-03

## 2019-07-03 ENCOUNTER — OFFICE VISIT (OUTPATIENT)
Dept: OBGYN CLINIC | Age: 49
End: 2019-07-03

## 2019-07-03 VITALS
SYSTOLIC BLOOD PRESSURE: 116 MMHG | HEIGHT: 63 IN | TEMPERATURE: 98.3 F | DIASTOLIC BLOOD PRESSURE: 74 MMHG | WEIGHT: 156.4 LBS | BODY MASS INDEX: 27.71 KG/M2 | HEART RATE: 78 BPM

## 2019-07-03 DIAGNOSIS — N99.842 POSTOPERATIVE SEROMA INVOLVING GENITOURINARY SYSTEM AFTER GENITOURINARY PROCEDURE: Primary | ICD-10-CM

## 2019-07-03 NOTE — PROGRESS NOTES
PO- 4 weeks- s/p GLADYS presents to the office for ED follow-up. She states she had right sided pain for the past 5 days. She was diagnosed with a seroma and had a fluid collection at the cuff. She didn't have a white count or fever. She was sent home. She also notes intermittent right leg paresthesias of the lower, right leg. She notes she fell on Wednesday after she woke up and her leg was numb. Feeling continues to wax and wane. She denies any fever, LOF, vaginal bleeding or midline pain. She has maintained pelvic rest    Visit Vitals  /74   Pulse 78   Temp 98.3 °F (36.8 °C)   Ht 5' 3\" (1.6 m)   Wt 156 lb 6.4 oz (70.9 kg)   BMI 27.71 kg/m²     Gen: A&Ox3, NAD  Abdomen: soft, NT, well healed incisions x 4  SVE: cuff appears closed. Noted white, physiologic dischage  BME: cuff is closed, no defect. Possible posterior bulge. A/P  Post- hysterectomy seroma. Not draining. Will re-evaluate in 1 week with office ultrasound. Noted paraesthesia in the right leg.  Will send to neurology if paresthesia persist.

## 2019-07-08 RX ORDER — AMLODIPINE BESYLATE 5 MG/1
5 TABLET ORAL DAILY
Qty: 90 TAB | Refills: 0 | Status: SHIPPED | OUTPATIENT
Start: 2019-07-08 | End: 2019-10-10 | Stop reason: SDUPTHER

## 2019-07-19 ENCOUNTER — OFFICE VISIT (OUTPATIENT)
Dept: OBGYN CLINIC | Age: 49
End: 2019-07-19

## 2019-07-19 VITALS
HEIGHT: 63 IN | BODY MASS INDEX: 27.64 KG/M2 | TEMPERATURE: 97.8 F | HEART RATE: 88 BPM | SYSTOLIC BLOOD PRESSURE: 132 MMHG | WEIGHT: 156 LBS | DIASTOLIC BLOOD PRESSURE: 78 MMHG

## 2019-07-19 DIAGNOSIS — N99.842 POSTOPERATIVE SEROMA INVOLVING GENITOURINARY SYSTEM AFTER GENITOURINARY PROCEDURE: Primary | ICD-10-CM

## 2019-07-19 NOTE — PROGRESS NOTES
6 weeks post-op. Pt doing well with no complaints. She has no pain or VB. Notes some white vaginal discharge. Didn't have f/u pelvic ultrasound for vaginal cuff seroma. Left leg paraesthesia resolved. Visit Vitals  /78   Pulse 88   Temp 97.8 °F (36.6 °C) (Oral)   Ht 5' 3\" (1.6 m)   Wt 156 lb (70.8 kg)   LMP 05/04/2019   BMI 27.63 kg/m²     Gen: A&Ox3, NAD  SVE: NEFG, cuff appears normal.  Creamy white discharge appreciated. Cuff intact. NTTP    A/p  Doing well post-op. RTW note given. RTO 1 year or prn.

## 2019-07-19 NOTE — LETTER
NOTIFICATION OF RETURN TO WORK / SCHOOL 
 
7/19/2019 10:52 AM 
 
Ms. Ileana Zavaleta [address] [id number] [place] To Whom It May Concern: 
 
Ileana Zavaleta was under the care of [facility] from 6-5-2019 to 7-. She will be able to return to work/school on 7- with no restrictions. If there are questions or concerns please have the patient contact our office. Sincerely, Vito Breen MD

## 2019-12-06 ENCOUNTER — DOCUMENTATION ONLY (OUTPATIENT)
Dept: FAMILY MEDICINE CLINIC | Age: 49
End: 2019-12-06

## 2019-12-06 NOTE — LETTER
12/6/2019 Amanda Alarcon [address] [id number] Dear Ms. Amanda Alarcon, We had an appointment reserved for you on 12/05/2019 and were concerned when you did not show or call within 24 hours to cancel the appointment. Our policy is to call patients two days prior to their appointment to remind them of the date and time. We perform these calls as a courtesy to our patients and to allow us the opportunity to rebook the time slot should the appointment not be necessary. Recognizing that everyones time is valuable and that appointment time is limited, we ask that you provide 24 hours notice if you are unable to keep your appointment. Please call us at your earliest convenience to reschedule your appointment as your provider felt it was important to see you. Thank you for your anticipated cooperation. The scheduling staff: 
 
Aj Read [address] [facility] 
[phone]

## 2020-03-05 ENCOUNTER — OFFICE VISIT (OUTPATIENT)
Dept: FAMILY MEDICINE CLINIC | Age: 50
End: 2020-03-05

## 2020-03-05 VITALS
WEIGHT: 167 LBS | OXYGEN SATURATION: 99 % | HEART RATE: 73 BPM | SYSTOLIC BLOOD PRESSURE: 117 MMHG | RESPIRATION RATE: 20 BRPM | HEIGHT: 63 IN | TEMPERATURE: 98.4 F | BODY MASS INDEX: 29.59 KG/M2 | DIASTOLIC BLOOD PRESSURE: 77 MMHG

## 2020-03-05 DIAGNOSIS — J10.1 INFLUENZA A: Primary | ICD-10-CM

## 2020-03-05 DIAGNOSIS — I10 ESSENTIAL HYPERTENSION: ICD-10-CM

## 2020-03-05 DIAGNOSIS — R05.9 COUGH: ICD-10-CM

## 2020-03-05 RX ORDER — ALBUTEROL SULFATE 90 UG/1
1-2 AEROSOL, METERED RESPIRATORY (INHALATION)
COMMUNITY
Start: 2020-03-03 | End: 2020-06-08 | Stop reason: SDUPTHER

## 2020-03-05 RX ORDER — OSELTAMIVIR PHOSPHATE 75 MG/1
CAPSULE ORAL
COMMUNITY
End: 2020-04-06 | Stop reason: ALTCHOICE

## 2020-03-05 RX ORDER — AMLODIPINE BESYLATE 5 MG/1
5 TABLET ORAL DAILY
Qty: 90 TAB | Refills: 1 | Status: SHIPPED | OUTPATIENT
Start: 2020-03-05 | End: 2020-09-30 | Stop reason: SDUPTHER

## 2020-03-05 RX ORDER — CODEINE PHOSPHATE AND GUAIFENESIN 10; 100 MG/5ML; MG/5ML
5 SOLUTION ORAL
Qty: 70 ML | Refills: 0 | Status: SHIPPED | OUTPATIENT
Start: 2020-03-05 | End: 2020-03-12

## 2020-03-05 NOTE — PATIENT INSTRUCTIONS
Influenza (Flu): Care Instructions  Your Care Instructions    Influenza (flu) is an infection in the lungs and breathing passages. It is caused by the influenza virus. There are different strains, or types, of the flu virus from year to year. Unlike the common cold, the flu comes on suddenly and the symptoms, such as a cough, congestion, fever, chills, fatigue, aches, and pains, are more severe. These symptoms may last up to 10 days. Although the flu can make you feel very sick, it usually doesn't cause serious health problems. Home treatment is usually all you need for flu symptoms. But your doctor may prescribe antiviral medicine to prevent other health problems, such as pneumonia, from developing. Older people and those who have a long-term health condition, such as lung disease, are most at risk for having pneumonia or other health problems. Follow-up care is a key part of your treatment and safety. Be sure to make and go to all appointments, and call your doctor if you are having problems. It's also a good idea to know your test results and keep a list of the medicines you take. How can you care for yourself at home? · Get plenty of rest.  · Drink plenty of fluids, enough so that your urine is light yellow or clear like water. If you have kidney, heart, or liver disease and have to limit fluids, talk with your doctor before you increase the amount of fluids you drink. · Take an over-the-counter pain medicine if needed, such as acetaminophen (Tylenol), ibuprofen (Advil, Motrin), or naproxen (Aleve), to relieve fever, headache, and muscle aches. Read and follow all instructions on the label. No one younger than 20 should take aspirin. It has been linked to Reye syndrome, a serious illness. · Do not smoke. Smoking can make the flu worse. If you need help quitting, talk to your doctor about stop-smoking programs and medicines. These can increase your chances of quitting for good.   · Breathe moist air from a hot shower or from a sink filled with hot water to help clear a stuffy nose. · Before you use cough and cold medicines, check the label. These medicines may not be safe for young children or for people with certain health problems. · If the skin around your nose and lips becomes sore, put some petroleum jelly on the area. · To ease coughing:  ? Drink fluids to soothe a scratchy throat. ? Suck on cough drops or plain hard candy. ? Take an over-the-counter cough medicine that contains dextromethorphan to help you get some sleep. Read and follow all instructions on the label. ? Raise your head at night with an extra pillow. This may help you rest if coughing keeps you awake. · Take any prescribed medicine exactly as directed. Call your doctor if you think you are having a problem with your medicine. To avoid spreading the flu  · Wash your hands regularly, and keep your hands away from your face. · Stay home from school, work, and other public places until you are feeling better and your fever has been gone for at least 24 hours. The fever needs to have gone away on its own without the help of medicine. · Ask people living with you to talk to their doctors about preventing the flu. They may get antiviral medicine to keep from getting the flu from you. · To prevent the flu in the future, get a flu vaccine every fall. Encourage people living with you to get the vaccine. · Cover your mouth when you cough or sneeze. When should you call for help? Call 911 anytime you think you may need emergency care.  For example, call if:    · You have severe trouble breathing.    Call your doctor now or seek immediate medical care if:    · You have new or worse trouble breathing.     · You seem to be getting much sicker.     · You feel very sleepy or confused.     · You have a new or higher fever.     · You get a new rash.    Watch closely for changes in your health, and be sure to contact your doctor if:    · You begin to get better and then get worse.     · You are not getting better after 1 week. Where can you learn more? Go to http://sherrie-wally.info/. Enter M179 in the search box to learn more about \"Influenza (Flu): Care Instructions. \"  Current as of: June 9, 2019  Content Version: 12.2  © 6931-8810 [employer]. Care instructions adapted under license by [employer] (which disclaims liability or warranty for this information). If you have questions about a medical condition or this instruction, always ask your healthcare professional. [address] any warranty or liability for your use of this information.

## 2020-03-05 NOTE — PROGRESS NOTES
Aguilar Richards is a 52 y.o.  female and presents with    Chief Complaint   Patient presents with    ED Follow-up    Other     body    Medication Refill       Subjective:  Ms. Lewis Blank presents today with complaints of chills and body aches. She was seen at the [facility] on 3/2/20 and diagnosed with the flu. She was given Tamiflu. She returned to the ED on 3/3/20 with complaints of shortness of  Breath and cough. She was given a breathing treatment and albuterol. She was given a note to stay off of work until 3/9/20 but states she needs paperwork completed so she doesn't lose her job. She does not have the paperwork with her at this time. She was unable to fill Tussionex due to prior auth. She did not get the flu vaccine this season. Cardiovascular Review:  The patient has hypertension. Diet and Lifestyle: generally follows a low sodium diet  Home BP Monitoring: is not measured at home. Pertinent ROS: taking medications as instructed, no medication side effects noted, no TIA's, no chest pain on exertion, no dyspnea on exertion, no swelling of ankles. 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  oseltamivir (TAMIFLU) 75 mg capsule Take  by mouth.  amLODIPine (NORVASC) 5 mg tablet TAKE 1 TABLET BY MOUTH EVERY DAY 30 Tab 1    ibuprofen (MOTRIN) 800 mg tablet Take 1 Tab by mouth every eight (8) hours as needed for Pain. Indications: Pain 30 Tab 0    ferrous sulfate (IRON) 325 mg (65 mg iron) EC tablet Take 1 Tab by mouth three (3) times daily (with meals).  90 Tab 3     No Known Allergies  Past Medical History:   Diagnosis Date    Essential hypertension 2019    Iron deficiency anemia due to chronic blood loss 2019    Low iron     Neuropraxia of right lower extremity 2019     Past Surgical History:   Procedure Laterality Date    HX  SECTION      HX TOTAL LAPAROSCOPIC HYSTERECTOMY       Family History   Problem Relation Age of Onset    Breast Cancer Mother     Breast Cancer Sister     No Known Problems Father      Social History     Tobacco Use    Smoking status: Never Smoker    Smokeless tobacco: Never Used   Substance Use Topics    Alcohol use: Yes     Frequency: Monthly or less     Drinks per session: 3 or 4     Binge frequency: Less than monthly     Comment: occasional       ROS   History obtained from the patient  General ROS: positive for  - chills, fatigue and fever  ENT ROS: positive for - headaches  Respiratory ROS: positive for - cough and shortness of breath  Cardio ROS: negative for chest pain   All other systems reviewed and are negative.       Objective:  Vitals:    20 1314   BP: 117/77   Pulse: 73   Resp: 20   Temp: 98.4 °F (36.9 °C)   TempSrc: Oral   SpO2: 99%   Weight: 167 lb (75.8 kg)   Height: 5' 3\" (1.6 m)   PainSc:  10 - Worst pain ever   PainLoc: Generalized   LMP: 2019       PE  General appearance - ill-appearing  Mental status - normal mood, behavior, speech, dress, motor activity, and thought processes  Neck - supple, no significant adenopathy  Chest - clear to auscultation, no wheezes, rales or rhonchi, symmetric air entry  Heart - normal rate and regular rhythm  Abdomen - tenderness noted generalized       LABS   Lab Results  - documented in this encounter    Influenza A and B PCR (2020 12:08 PM EST)  Influenza A and B PCR (2020 12:08 PM EST)   Component Value Ref Range Performed At Pathologist Signature   Influenza A PCR DETECTED (A) None Detected [facility]     Influenza B PCR None Detected None Detected [facility]ORY               Assessment/Plan:    1. Influenza A      -Made patient aware that typical duration of flu can last anywhere from 7 to 10 days. Continue with Tamiflu that was prescribed by the emergency room. Encouraged to increase fluid intake. [place] diet. Advised to avoid contact with others at this time. Encouraged handwashing. If symptoms persist or worsen please return to the office or the emergency room for further evaluation. 2. Essential hypertension  -     amLODIPine (NORVASC) 5 mg tablet; Take 1 Tab by mouth daily. 3. Cough  -     guaiFENesin-codeine (VIRTUSSIN AC) 100-10 mg/5 mL solution; Take 5 mL by mouth three (3) times daily as needed for Cough for up to 7 days. Max Daily Amount: 15 mL. Lab review: no lab studies available for review at time of visit        Health Maintenance:     I have discussed the diagnosis with the patient and the intended plan as seen in the above orders. The patient has received an after-visit summary and questions were answered concerning future plans. I have discussed medication side effects and warnings with the patient as well. I have reviewed the plan of care with the patient, accepted their input and they are in agreement with the treatment goals. Follow-up and Dispositions    · Return in about 3 months (around 6/5/2020), or if symptoms worsen or fail to improve. More than 1/2 of this 15 minute visit was spent in counseling and coordination of care, as described above.     Lina Finn DNP, FNP-C

## 2020-03-05 NOTE — PROGRESS NOTES
Room #  2    Chief Complaint:   ED follow up  Generalized body aches    HPI:    Sridhar Burnett is a 52 y.o. female who presents today for c/o ED follow up for dx of Flu. Patient is currently taking medications tamilflu     1. Have you been to the ER, urgent care clinic since your last visit? Hospitalized since your last visit? Yes  When:3/2/220    2. Have you seen or consulted any other health care providers outside of the [address] [place] [address] since your last visit? Include any pap smears or colon screening. NO  When :  Reason:      Health Maintenance reviewed Yes    Health Maintenance Due   Topic Date Due    Breast Cancer Screen Mammogram  07/13/2010    Lipid Screen  07/13/2010    Influenza Age 5 to Adult  08/01/2019     Depression Screening:  3 most recent PHQ Screens 3/5/2020   Little interest or pleasure in doing things Not at all   Feeling down, depressed, irritable, or hopeless Not at all   Total Score PHQ 2 0     Learning Assessment:  Learning Assessment 5/9/2019   PRIMARY LEARNER Patient   PRIMARY LANGUAGE ENGLISH   LEARNER PREFERENCE PRIMARY OTHER (COMMENT)   ANSWERED BY patient   RELATIONSHIP SELF     Abuse Screening:  No flowsheet data found. Fall Risk  No flowsheet data found.     Last  Checked n/a  Last UDS Checked n/a  Last Pain contract signed: n/a

## 2020-03-20 ENCOUNTER — TELEPHONE (OUTPATIENT)
Dept: FAMILY MEDICINE CLINIC | Age: 50
End: 2020-03-20

## 2020-03-20 NOTE — TELEPHONE ENCOUNTER
Patient stated that she went to ER on March 2nd due to flu. Pt stated that she saw Kailey Sanchez for ER F/U and was told to let her know if she still having flu like symptoms. Pt explained that she needs her paperwork to come back to work extended as she still not feeling better. Asking to be called back.

## 2020-03-20 NOTE — TELEPHONE ENCOUNTER
Return call to patient she stated that she was tested positive for the flu in the ED but did a follow up with PCP but was sent back home today due to continuous coughing. She requests an extension to return to work  Letter until Wednesday at least.She stated that she was advised by the ED that she should not come in to the ED unless she feels like she is dying but continue to follow her doctor's advised and also to take motrin.  Also advised patient to continue with self quarantine until further advised

## 2020-03-23 ENCOUNTER — TELEPHONE (OUTPATIENT)
Dept: FAMILY MEDICINE CLINIC | Age: 50
End: 2020-03-23

## 2020-03-23 ENCOUNTER — PATIENT MESSAGE (OUTPATIENT)
Dept: FAMILY MEDICINE CLINIC | Age: 50
End: 2020-03-23

## 2020-03-23 NOTE — TELEPHONE ENCOUNTER
Patient called in stating that her daughter will  the work note. Patient would like a call when it is ready to be picked up.  Please assist.

## 2020-03-23 NOTE — TELEPHONE ENCOUNTER
Returned patient's call from Friday. She reports persistent cough and fever. She states fever 102-103- last fever was last night. Has been taking tylenol. Has not taken temperature yet this morning. Denies shortness of breath. Reports cough that is productive- of green/yellow sputum. Has been taking Cough medicine that was prescribed at [facility]. She denies body aches but reports headaches. Denies recent travel or being in contact with anyone that has traveled recently. Was diagnosed with the flu at [facility] 3/3/20. Was told to stay home until 3/9/20. Told patient to self quarantine for 14 days. First day at home was on 3/21/20. Will need to stay home until 4/3/20. Patient to call office back with work fax so letter can be sent. Was advised if fever, cough, or breathing worsens to call office so she can be referred to the flu clinic- cannot be seen at this office. She verbalized understanding and has no additional questions or concerns at this time.

## 2020-03-24 ENCOUNTER — TELEPHONE (OUTPATIENT)
Dept: FAMILY MEDICINE CLINIC | Age: 50
End: 2020-03-24

## 2020-03-24 NOTE — TELEPHONE ENCOUNTER
Patient requesting a letter of self for 14 days to submit to her job. Her daughter will  at office she also requests a copy for her records. Her job office fax # is [phone]. Please assist

## 2020-03-24 NOTE — TELEPHONE ENCOUNTER
Pt. Stated she received her FMLA paperwork from her job and wants to know what she need to do. Pt. Would like to speak with nurse.  Please assist.

## 2020-03-31 ENCOUNTER — TELEPHONE (OUTPATIENT)
Dept: OBGYN CLINIC | Age: 50
End: 2020-03-31

## 2020-03-31 ENCOUNTER — TELEPHONE (OUTPATIENT)
Dept: FAMILY MEDICINE CLINIC | Age: 50
End: 2020-03-31

## 2020-03-31 NOTE — TELEPHONE ENCOUNTER
Pt. Stated she need paperwork filled out from when she had the flu. She is asking if she could drop it off because it needs to be filled and faxed to her job by Friday. Please advise.

## 2020-03-31 NOTE — TELEPHONE ENCOUNTER
Patient called with concerns regarding her surgical scar. She states it becomes swollen in one spot.  Patient unable to come to phone due to she has the flu

## 2020-03-31 NOTE — TELEPHONE ENCOUNTER
Patient contacted to inquire if she can fax it to the office. She will fax to office. Please advise if she will need an appointment for paper work completion.

## 2020-04-02 ENCOUNTER — TELEPHONE (OUTPATIENT)
Dept: FAMILY MEDICINE CLINIC | Age: 50
End: 2020-04-02

## 2020-04-02 NOTE — TELEPHONE ENCOUNTER
Patient contacted office with c/o cough. Patient was advised to  Self quarrantine after testing positive for flu. Willie Ly Appointment schedule for 4/6/20 @ 9:00 am tele medicine my chart visit.

## 2020-04-03 ENCOUNTER — TELEPHONE (OUTPATIENT)
Dept: FAMILY MEDICINE CLINIC | Age: 50
End: 2020-04-03

## 2020-04-03 ENCOUNTER — VIRTUAL VISIT (OUTPATIENT)
Dept: FAMILY MEDICINE CLINIC | Age: 50
End: 2020-04-03

## 2020-04-03 VITALS — BODY MASS INDEX: 29.58 KG/M2 | HEIGHT: 63 IN

## 2020-04-03 DIAGNOSIS — J06.9 ACUTE UPPER RESPIRATORY INFECTION: ICD-10-CM

## 2020-04-03 DIAGNOSIS — R05.2 SUBACUTE COUGH: Primary | ICD-10-CM

## 2020-04-03 NOTE — LETTER
4/3/2020 10:16 AM 
 
Ms. Ángel Irizarry [address] [id number] To Whom It May Concern: 
  
Ángel Irizarry is currently under the care of [address] [place] [address]. 
  
Due to her persistent fever and upper respiratory symptoms and risk of COVID-19, she was told to self quarantine for another 7 days. She has been home since 3/21/20.  
  
She will return to work on: April 13, 2020 
  
If there are questions or concerns please have the patient contact our office. Sincerely, Desiree Vazquez MD

## 2020-04-03 NOTE — TELEPHONE ENCOUNTER
Letter faxed to [phone]. Patient to self quarantine for another 7 days.  Return to work on 4/13/20

## 2020-04-03 NOTE — PROGRESS NOTES
Room #  Tele-medicine My chart    Chief Complaint:    Cough    HPI:    Galo Rodriguez is a 52 y.o. female who presents today for c/o cough patient was previously on 2 weeks self quarantine. 1. Have you been to the ER, urgent care clinic since your last visit? Hospitalized since your last visit? YES When:    2. Have you seen or consulted any other health care providers outside of the [address] [place] [address] since your last visit? Include any pap smears or colon screening. NO  When :  Reason:    Last  Checked na  Last UDS Checked na  Last Pain contract signed: na    Health Maintenance reviewed Yes    Health Maintenance Due   Topic Date Due    Breast Cancer Screen Mammogram  07/13/2010     Depression Screening:  3 most recent PHQ Screens 3/5/2020   Little interest or pleasure in doing things Not at all   Feeling down, depressed, irritable, or hopeless Not at all   Total Score PHQ 2 0     Learning Assessment:  Learning Assessment 5/9/2019   PRIMARY LEARNER Patient   PRIMARY LANGUAGE ENGLISH   LEARNER PREFERENCE PRIMARY OTHER (COMMENT)   ANSWERED BY patient   RELATIONSHIP SELF     Abuse Screening:  No flowsheet data found. Fall Risk  No flowsheet data found.

## 2020-04-06 ENCOUNTER — VIRTUAL VISIT (OUTPATIENT)
Dept: FAMILY MEDICINE CLINIC | Age: 50
End: 2020-04-06

## 2020-04-06 ENCOUNTER — TELEPHONE (OUTPATIENT)
Dept: FAMILY MEDICINE CLINIC | Age: 50
End: 2020-04-06

## 2020-04-06 VITALS — TEMPERATURE: 99 F | HEIGHT: 63 IN | BODY MASS INDEX: 29.58 KG/M2

## 2020-04-06 DIAGNOSIS — B00.1 FEVER BLISTER: Primary | ICD-10-CM

## 2020-04-06 RX ORDER — CODEINE PHOSPHATE AND GUAIFENESIN 10; 100 MG/5ML; MG/5ML
SOLUTION ORAL
COMMUNITY
Start: 2020-03-16 | End: 2020-06-08 | Stop reason: SDUPTHER

## 2020-04-06 RX ORDER — ACYCLOVIR 200 MG/1
400 CAPSULE ORAL 3 TIMES DAILY
Qty: 42 CAP | Refills: 0 | Status: SHIPPED | OUTPATIENT
Start: 2020-04-06 | End: 2020-04-13

## 2020-04-06 NOTE — PROGRESS NOTES
Room #  [facility]. me    Chief Complaint:  Sore on lip  Swollen lip  Fever  HPI:    Mike Ellison is a 52 y.o. female who presents today for c/o Fever 99.0, Sore on lip and Swollen lip since Saturday. 1. Have you been to the ER, urgent care clinic since your last visit? Hospitalized since your last visit? NO When:    2. Have you seen or consulted any other health care providers outside of the [address] [place] [address] since your last visit? Include any pap smears or colon screening. NO  When :  Reason:    Last  Checked na  Last UDS Checked na  Last Pain contract signed: na    Consent:  She and/or health care decision maker is aware that that she may receive a bill for this telephone service, depending on her insurance coverage, and has provided verbal consent to proceed: Yes      Health Maintenance reviewed Yes    Health Maintenance Due   Topic Date Due    Breast Cancer Screen Mammogram  07/13/2010     Depression Screening:  3 most recent PHQ Screens 4/3/2020   Little interest or pleasure in doing things Not at all   Feeling down, depressed, irritable, or hopeless Not at all   Total Score PHQ 2 0     Learning Assessment:  Learning Assessment 5/9/2019   PRIMARY LEARNER Patient   PRIMARY LANGUAGE ENGLISH   LEARNER PREFERENCE PRIMARY OTHER (COMMENT)   ANSWERED BY patient   RELATIONSHIP SELF     Abuse Screening:  Abuse Screening Questionnaire 4/3/2020   Do you ever feel afraid of your partner? N   Are you in a relationship with someone who physically or mentally threatens you? N   Is it safe for you to go home? Y     Fall Risk  Fall Risk Assessment, last 12 mths 4/3/2020   Able to walk? Yes   Fall in past 12 months?  No

## 2020-04-06 NOTE — TELEPHONE ENCOUNTER
Pt. Stated she has a cold sore on her bottom lip. Pt. Stated her temp this morning was 99.0 this morning. Please advise.

## 2020-04-06 NOTE — PROGRESS NOTES
Scarlett Canavan is a 52 y.o.  female and presents with    Chief Complaint   Patient presents with    Mouth Swelling    Mouth Lesions    Fever     Scarlett Canavan is a 52 y.o. female who was seen by synchronous (real-time) audio-video technology via doxy. me on 4/6/2020. Consent:  She and/or her healthcare decision maker is aware that this patient-initiated Telehealth encounter is a billable service, with coverage as determined by her insurance carrier. She is aware that she may receive a bill and has provided verbal consent to proceed: Yes    I was in the office while conducting this encounter. Pursuant to the emergency declaration under the [facility], 1135 waiver authority and the [employer] and [employer] Act, this Virtual  Visit was conducted, with patient's consent, to reduce the patient's risk of exposure to COVID-19 and provide continuity of care for an established patient. Services were provided through a video synchronous discussion virtually to substitute for in-person clinic visit. This service was provided through telehealth, both the patient at home and the provider and WOO Adkins in the office. Subjective:  Ms. John Greenfield reports blisters to her lower lip that appeared on Saturday night. She reports pain. She feels like her lip has become swollen. She reports tenderness to the tip of her tongue. She denies lesions anywhere in her mouth. She feels \"bumps\" in her left nostril. She checked her temperature today and it was 99F. On Friday, she had a temperature of 102 but took motrin/tylenol for her symptoms. She bought OTC abreva but feels after using it the bumps on her lip spread. She denies history of herpes infection. She has history of testing positive for Flu on 3/2/20 and has been having intermittent fever and cough since then.  She out of work until 3/9 but then had a recurrence of symptoms and was told to stay off of work until . She was seen virtually on 4/3 with complaints of cough and intermittent fever and told to continue self quarantine for another 7 days. 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  amLODIPine (NORVASC) 5 mg tablet Take 1 Tab by mouth daily. 90 Tab 1    ibuprofen (MOTRIN) 800 mg tablet Take 1 Tab by mouth every eight (8) hours as needed for Pain. Indications: Pain 30 Tab 0    ferrous sulfate (IRON) 325 mg (65 mg iron) EC tablet Take 1 Tab by mouth three (3) times daily (with meals).  90 Tab 3    guaiFENesin-codeine (ROBITUSSIN AC) 100-10 mg/5 mL solution TAKE 5ML BY MOUTH 3 TIMES A DAY AS NEEDED FOR COUGH FOR UP TO 7 DAYS       No Known Allergies  Past Medical History:   Diagnosis Date    Essential hypertension 2019    Iron deficiency anemia due to chronic blood loss 2019    Low iron     Neuropraxia of right lower extremity 2019     Past Surgical History:   Procedure Laterality Date    HX  SECTION      HX TOTAL LAPAROSCOPIC HYSTERECTOMY       Family History   Problem Relation Age of Onset    Breast Cancer Mother     Breast Cancer Sister     No Known Problems Father      Social History     Tobacco Use    Smoking status: Never Smoker    Smokeless tobacco: Never Used   Substance Use Topics    Alcohol use: Yes     Frequency: Monthly or less     Drinks per session: 3 or 4     Binge frequency: Less than monthly     Comment: occasional       ROS   History obtained from the patient  General ROS: positive for  - fever- intermittent   ENT ROS: positive for - blisters on bottom lip     All other systems reviewed and are negative. Objective:  Vitals:    04/06/20 1056   Temp: 99 °F (37.2 °C)   TempSrc: Oral   Height: 5' 3\" (1.6 m)   LMP: 05/04/2019       PE  General appearance - alert, well appearing, and in no distress  Mental status - normal mood, behavior, speech, dress, motor activity, and thought processes  Mouth - fluid filled cluster of lesions noted to bottom lip         Assessment/Plan:    1. Fever blister  -     acyclovir (ZOVIRAX) 200 mg capsule; Take 2 Caps by mouth three (3) times daily for 7 days. -recommended to avoid touching lips/face to prevent the spread of lesions       Lab review: no lab studies available for review at time of visit        Health Maintenance:     I have discussed the diagnosis with the patient and the intended plan as seen in the above orders. The patient has received an after-visit summary and questions were answered concerning future plans. I have discussed medication side effects and warnings with the patient as well. I have reviewed the plan of care with the patient, accepted their input and they are in agreement with the treatment goals. Follow-up and Dispositions    · Return if symptoms worsen or fail to improve. More than 1/2 of this 15 minute visit was spent in counseling and coordination of care, as described above.     Juan M Trevino DNP, FNP-C

## 2020-05-29 ENCOUNTER — VIRTUAL VISIT (OUTPATIENT)
Dept: FAMILY MEDICINE CLINIC | Age: 50
End: 2020-05-29

## 2020-05-29 DIAGNOSIS — L24.89 IRRITANT CONTACT DERMATITIS DUE TO OTHER AGENTS: Primary | ICD-10-CM

## 2020-05-29 RX ORDER — TRIAMCINOLONE ACETONIDE 1 MG/G
OINTMENT TOPICAL 2 TIMES DAILY
Qty: 80 G | Refills: 2 | Status: SHIPPED | OUTPATIENT
Start: 2020-05-29 | End: 2020-06-12

## 2020-05-29 NOTE — PROGRESS NOTES
Aly Huerta was seen by synchronous (real-time) audio-video technology DOXY on 2020. Consent: Ayl Huerta, who was seen by synchronous (real-time) audio-video technology, and/or her healthcare decision maker, is aware that this patient-initiated, Telehealth encounter on 2020 is a billable service, with coverage as determined by her insurance carrier. She is aware that she may receive a bill and has provided verbal consent to proceed: yes  712  Subjective:     HPI:  Aly Huerta is a 52 y.o. female who was seen for the following: rash    On her hands and arms. Itchy. Onset about 1 week ago. Using otc cortisone cream and benadryl which helped a little. Wears gloves all day at work. They are latex and nitrile. Feels well overall and denies systemic symptoms. Review of Systems   Rash per HPI    Past Medical History, Past Surgery History, Allergies, Social History, and Family History were reviewed and updated. 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     Past Medical History:   Diagnosis Date    Essential hypertension 2019    Iron deficiency anemia due to chronic blood loss 2019    Low iron     Neuropraxia of right lower extremity 2019     Patient Care Team:  Juve Cabrera NP as PCP - General (Family Practice)  [facility]    Past Surgical History:   Procedure Laterality Date    HX  SECTION      HX TOTAL LAPAROSCOPIC HYSTERECTOMY       Family History   Problem Relation Age of Onset    Breast Cancer Mother     Breast Cancer Sister     No Known Problems Father      Social History     Tobacco Use    Smoking status: Never Smoker    Smokeless tobacco: Never Used   Substance Use Topics    Alcohol use: Yes     Frequency: Monthly or less     Drinks per session: 3 or 4     Binge frequency: Less than monthly     Comment: occasional    Drug use: No     No Known Allergies  Current Outpatient Medications on File Prior to Visit   Medication Sig Dispense Refill    guaiFENesin-codeine (ROBITUSSIN AC) 100-10 mg/5 mL solution TAKE 5ML BY MOUTH 3 TIMES A DAY AS NEEDED FOR COUGH FOR UP TO 7 DAYS      albuterol (PROVENTIL HFA, VENTOLIN HFA, PROAIR HFA) 90 mcg/actuation inhaler Take 1-2 Puffs by inhalation.  amLODIPine (NORVASC) 5 mg tablet Take 1 Tab by mouth daily. 90 Tab 1    ibuprofen (MOTRIN) 800 mg tablet Take 1 Tab by mouth every eight (8) hours as needed for Pain. Indications: Pain 30 Tab 0    ferrous sulfate (IRON) 325 mg (65 mg iron) EC tablet Take 1 Tab by mouth three (3) times daily (with meals). 90 Tab 3     No current facility-administered medications on file prior to visit. Health Maintenance Due   Topic Date Due    Breast Cancer Screen Mammogram  07/13/2010         Objective     PHYSICAL EXAMINATION:    Vital Signs: (As obtained by patient/caregiver at home) LMP 05/04/2019       General: Alert, cooperative, no distress   Mental  status: Normal mood, behavior, speech, dress, motor activity, and thought processes, able to follow commands   HENT: Normocephalic, atraumatic   Neck: No visualized mass   Resp: No respiratory distress   Neuro: No gross deficits   Skin: No discoloration or lesions of concern on visible areas   Psychiatric: Normal affect, consistent with stated mood, no evidence of hallucinations     Additional exam findings: patchy, erythematous rash noted to the bilateral hands and arms. Assessment and Plan     1. Irritant contact dermatitis due to other agents  Kenalog as directed. Use for 2 weeks, then off for 2 weeks, then repeat prn. Avoid latex gloves. Use an emollient such as cerave. - triamcinolone acetonide (KENALOG) 0.1 % ointment;  Apply  to affected area two (2) times a day for 14 days. use thin layer. Dispense: 80 g; Refill: 2      Follow-up and Dispositions    · Return if symptoms worsen or fail to improve. 712  We discussed the expected course, resolution and complications of the diagnosis(es) in detail. Medication risks, benefits, costs, interactions, and alternatives were discussed as indicated. I advised her to contact the office if her condition worsens, changes or fails to improve as anticipated. She expressed understanding with the diagnosis(es) and plan. Demetrio Marcial is a 52 y.o. female who was evaluated by a video visit encounter for concerns as above. Patient identification was verified prior to start of the visit. A caregiver was present when appropriate. Due to this being a TeleHealth encounter (During [place] [address] public [facility] emergency), evaluation of the following organ systems was limited: Vitals/Constitutional/EENT/Resp/CV/GI//MS/Neuro/Skin/Heme-Lymph-Imm. Pursuant to the emergency declaration under the [facility], [facility] waiver authority and the [employer] and [employer] Act, this Virtual  Visit was conducted, with patient's (and/or legal guardian's) consent, to reduce the patient's risk of exposure to COVID-19 and provide necessary medical care. Services were provided through a video synchronous discussion virtually to substitute for in-person clinic visit. Patient was located at home and provider was located at the office.       Signed electronically by Karla Ledezma DNP, FNP-BC

## 2020-06-08 ENCOUNTER — VIRTUAL VISIT (OUTPATIENT)
Dept: FAMILY MEDICINE CLINIC | Age: 50
End: 2020-06-08

## 2020-06-08 DIAGNOSIS — R06.09 DYSPNEA ON EXERTION: Primary | ICD-10-CM

## 2020-06-08 DIAGNOSIS — Z12.39 BREAST CANCER SCREENING: ICD-10-CM

## 2020-06-08 DIAGNOSIS — M79.604 RIGHT LEG PAIN: ICD-10-CM

## 2020-06-08 DIAGNOSIS — R05.9 COUGH: ICD-10-CM

## 2020-06-08 DIAGNOSIS — R06.2 WHEEZING: ICD-10-CM

## 2020-06-08 DIAGNOSIS — I10 ESSENTIAL HYPERTENSION: ICD-10-CM

## 2020-06-08 RX ORDER — CODEINE PHOSPHATE AND GUAIFENESIN 10; 100 MG/5ML; MG/5ML
5 SOLUTION ORAL
Qty: 118 ML | Refills: 0 | Status: SHIPPED | OUTPATIENT
Start: 2020-06-08 | End: 2020-06-15

## 2020-06-08 RX ORDER — ALBUTEROL SULFATE 90 UG/1
1 AEROSOL, METERED RESPIRATORY (INHALATION)
Qty: 1 INHALER | Refills: 0 | Status: SHIPPED | OUTPATIENT
Start: 2020-06-08 | End: 2020-07-20

## 2020-06-08 NOTE — PROGRESS NOTES
Norberto Hallman is a 52 y.o.  female and presents with       Norberto Hallman is a 52 y.o. female who was evaluated by a video visit encounter for concerns listed below. Patient identification was verified prior to start of the visit. A caregiver was present when appropriate. Due to this being a TeleHealth encounter (During EOAAG-08 public health emergency), evaluation of the following organ systems was limited: Vitals/Constitutional/EENT/Resp/CV/GI//MS/Neuro/Skin/Heme-Lymph-Imm. Pursuant to the emergency declaration under the [facility], [facility] waiver authority and the [employer] and [employer] Act, this Virtual  Visit was conducted, with patient's (and/or legal guardian's) consent, to reduce the patient's risk of exposure to COVID-19 and provide necessary medical care. Services were provided through a video synchronous discussion virtually to substitute for in-person clinic visit. Patient and provider were located at their individual homes/office       Consent: Norberto Hallman, who was seen by synchronous (real-time) audio-video technology via Doxy. me, and/or her healthcare decision maker, is aware that this patient-initiated, Telehealth encounter on 6/8/2020 is a billable service, with coverage as determined by her insurance carrier. She is aware that she may receive a bill and has provided verbal consent to proceed: Yes. Chief Complaint   Patient presents with    Wheezing     Subjective:  Ms. Eileen Currie presents today stating that she found out on 6/2/20 that someone from her place of work tested positive for COVID-19. She went to [facility] on 6/4/20 to get tested. She is supposed to get a call today with the results. She reports wheezing and coughing. Cough is somewhat productive of yellow sputum. Cough is worse at nighttime. She also reports a headache. Temperature was 99.1.  She reports having the flu back in March and feels like her symptoms are coming back again. She reports weakness and aching in the right leg. Symptoms started yesterday. She reports redness to her right calf and some swelling. Pain is worse when walking. Pain is similar to when she had her hysterectomy last year. She had PT for a few weeks and symptoms resolved. Cardiovascular Review:  The patient has hypertension. Diet and Lifestyle: generally follows a low sodium diet  Home BP Monitoring: is not measured at home. Pertinent ROS: taking medications as instructed, no medication side effects noted, no TIA's, no chest pain on exertion, no dyspnea on exertion, no swelling of ankles. 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 use thin layer. 80 g 2    amLODIPine (NORVASC) 5 mg tablet Take 1 Tab by mouth daily. 90 Tab 1    ibuprofen (MOTRIN) 800 mg tablet Take 1 Tab by mouth every eight (8) hours as needed for Pain. Indications: Pain 30 Tab 0    ferrous sulfate (IRON) 325 mg (65 mg iron) EC tablet Take 1 Tab by mouth three (3) times daily (with meals). 90 Tab 3    guaiFENesin-codeine (ROBITUSSIN AC) 100-10 mg/5 mL solution TAKE 5ML BY MOUTH 3 TIMES A DAY AS NEEDED FOR COUGH FOR UP TO 7 DAYS      albuterol (PROVENTIL HFA, VENTOLIN HFA, PROAIR HFA) 90 mcg/actuation inhaler Take 1-2 Puffs by inhalation.        No Known Allergies  Past Medical History:   Diagnosis Date    Essential hypertension 5/21/2019    Iron deficiency anemia due to chronic blood loss 5/21/2019    Low iron     Neuropraxia of right lower extremity 6/7/2019 Past Surgical History:   Procedure Laterality Date    HX  SECTION      HX TOTAL LAPAROSCOPIC HYSTERECTOMY       Family History   Problem Relation Age of Onset    Breast Cancer Mother     Breast Cancer Sister     No Known Problems Father      Social History     Tobacco Use    Smoking status: Never Smoker    Smokeless tobacco: Never Used   Substance Use Topics    Alcohol use: Yes     Frequency: Monthly or less     Drinks per session: 3 or 4     Binge frequency: Less than monthly     Comment: occasional       ROS   Review of Systems   Constitutional: Negative for chills and fever. HENT: Positive for congestion. Respiratory: Positive for cough, sputum production, shortness of breath and wheezing. Musculoskeletal: Positive for myalgias. + right leg pain- calf pain       All other systems reviewed and are negative. PE  Objective:     Visit Vitals  LMP 2019      General: alert, cooperative, no distress   Mental  status: normal mood, behavior, speech, dress, motor activity, and thought processes, able to follow commands   HENT: NCAT   Neck: no visualized mass   Resp: no respiratory distress   Neuro: no gross deficits   Skin: no discoloration or lesions of concern on visible areas   Psychiatric: normal affect, consistent with stated mood, no evidence of hallucinations         Assessment/Plan:    1. Dyspnea on exertion  -     XR CHEST PA LAT; Future    2. Cough  -     XR CHEST PA LAT; Future  -     guaiFENesin-codeine (ROBITUSSIN AC) 100-10 mg/5 mL solution; Take 5 mL by mouth three (3) times daily as needed for Cough for up to 7 days. Max Daily Amount: 15 mL. 3. Wheezing  -     XR CHEST PA LAT; Future  -     albuterol (PROVENTIL HFA, VENTOLIN HFA, PROAIR HFA) 90 mcg/actuation inhaler; Take 1 Puff by inhalation every six (6) hours as needed for Wheezing or Shortness of Breath.     4. Right leg pain        -per patient she noticed redness and swelling; difficult to view on video visit; advised to wear compression stocking and monitor area; if symptoms worsen will order US     5. Essential hypertension    6. Breast cancer screening  -     KEE MAMMO BI SCREENING INCL CAD; Future        Lab review: no lab studies available for review at time of visit        Health Maintenance:     I have discussed the diagnosis with the patient and the intended plan as seen in the above orders. The patient has received an after-visit summary and questions were answered concerning future plans. I have discussed medication side effects and warnings with the patient as well. I have reviewed the plan of care with the patient, accepted their input and they are in agreement with the treatment goals. Follow-up and Dispositions    · Return if symptoms worsen or fail to improve, for pending chest xray . More than 1/2 of this 25 minute visit was spent in counseling and coordination of care, as described above.       Servando Chandler DNP, FNP-C

## 2020-06-08 NOTE — LETTER
NOTIFICATION RETURN TO WORK / SCHOOL 
 
6/8/2020 10:08 AM 
 
Ms. Yuniel Olivares [address] [id number] To Whom It May Concern: 
 
Yuniel Olivares is currently under the care of [address] [place] [address]. She will return to work/school on: Thursday June 11, 2020 If there are questions or concerns please have the patient contact our office.  
 
 
 
Sincerely, 
 
 
Jesus Montano NP

## 2020-06-11 ENCOUNTER — HOSPITAL ENCOUNTER (OUTPATIENT)
Dept: GENERAL RADIOLOGY | Age: 50
Discharge: HOME | End: 2020-06-11
Payer: MEDICAID

## 2020-06-11 DIAGNOSIS — R06.09 DYSPNEA ON EXERTION: ICD-10-CM

## 2020-06-11 DIAGNOSIS — R06.2 WHEEZING: ICD-10-CM

## 2020-06-11 DIAGNOSIS — R05.9 COUGH: ICD-10-CM

## 2020-06-11 PROCEDURE — 71046 X-RAY EXAM CHEST 2 VIEWS: CPT

## 2020-06-12 ENCOUNTER — OFFICE VISIT (OUTPATIENT)
Dept: OBGYN CLINIC | Age: 50
End: 2020-06-12

## 2020-06-12 VITALS
HEIGHT: 63 IN | BODY MASS INDEX: 29.95 KG/M2 | WEIGHT: 169 LBS | HEART RATE: 61 BPM | DIASTOLIC BLOOD PRESSURE: 90 MMHG | SYSTOLIC BLOOD PRESSURE: 114 MMHG

## 2020-06-12 DIAGNOSIS — Z90.710 S/P HYSTERECTOMY: ICD-10-CM

## 2020-06-12 DIAGNOSIS — R10.10 PAIN OF UPPER ABDOMEN: Primary | ICD-10-CM

## 2020-06-12 NOTE — PROGRESS NOTES
PATIENT PRESENT TO THE OFFICE TODAY WITH COMPLAINS ON INCISIONAL PAIN AND SWEELING FOR OVER AND YEAR , WITH LEG PAIN   Visit Vitals  /90   Pulse 61   Ht 5' 3\" (1.6 m)   Wt 169 lb (76.7 kg)   LMP 05/04/2019   BMI 29.94 kg/m²     Results for orders placed or performed during the hospital encounter of 06/29/19   CBC WITH AUTOMATED DIFF   Result Value Ref Range    WBC 8.4 4.6 - 13.2 K/uL    RBC 4.30 4.20 - 5.30 M/uL    HGB 8.7 (L) 12.0 - 16.0 g/dL    HCT 29.8 (L) 35.0 - 45.0 %    MCV 69.3 (L) 74.0 - 97.0 FL    MCH 20.2 (L) 24.0 - 34.0 PG    MCHC 29.2 (L) 31.0 - 37.0 g/dL    RDW 20.0 (H) 11.6 - 14.5 %    PLATELET 642 (H) 804 - 420 K/uL    MPV 10.6 9.2 - 11.8 FL    NEUTROPHILS 56 40 - 73 %    LYMPHOCYTES 28 21 - 52 %    MONOCYTES 9 3 - 10 %    EOSINOPHILS 5 0 - 5 %    BASOPHILS 2 0 - 2 %    ABS. NEUTROPHILS 4.6 1.8 - 8.0 K/UL    ABS. LYMPHOCYTES 2.4 0.9 - 3.6 K/UL    ABS. MONOCYTES 0.8 0.05 - 1.2 K/UL    ABS. EOSINOPHILS 0.4 0.0 - 0.4 K/UL    ABS. BASOPHILS 0.2 (H) 0.0 - 0.1 K/UL    PLATELET COMMENTS Increased Platelets      RBC COMMENTS ANISOCYTOSIS  2+        RBC COMMENTS MICROCYTOSIS  1+        RBC COMMENTS TARGET CELLS  1+        DF MANUAL     METABOLIC PANEL, COMPREHENSIVE   Result Value Ref Range    Sodium 142 136 - 145 mmol/L    Potassium 3.7 3.5 - 5.5 mmol/L    Chloride 106 100 - 108 mmol/L    CO2 29 21 - 32 mmol/L    Anion gap 7 3.0 - 18 mmol/L    Glucose 79 74 - 99 mg/dL    BUN 8 7.0 - 18 MG/DL    Creatinine 0.72 0.6 - 1.3 MG/DL    BUN/Creatinine ratio 11 (L) 12 - 20      GFR est AA >60 >60 ml/min/1.73m2    GFR est non-AA >60 >60 ml/min/1.73m2    Calcium 9.1 8.5 - 10.1 MG/DL    Bilirubin, total 0.2 0.2 - 1.0 MG/DL    ALT (SGPT) 18 13 - 56 U/L    AST (SGOT) 14 (L) 15 - 37 U/L    Alk.  phosphatase 91 45 - 117 U/L    Protein, total 7.5 6.4 - 8.2 g/dL    Albumin 3.6 3.4 - 5.0 g/dL    Globulin 3.9 2.0 - 4.0 g/dL    A-G Ratio 0.9 0.8 - 1.7     URINALYSIS W/ RFLX MICROSCOPIC   Result Value Ref Range    Color YELLOW      Appearance CLEAR      Specific gravity 1.012 1.005 - 1.030      pH (UA) 6.5 5.0 - 8.0      Protein NEGATIVE  NEG mg/dL    Glucose NEGATIVE  NEG mg/dL    Ketone NEGATIVE  NEG mg/dL    Bilirubin NEGATIVE  NEG      Blood NEGATIVE  NEG      Urobilinogen 1.0 0.2 - 1.0 EU/dL    Nitrites NEGATIVE  NEG      Leukocyte Esterase NEGATIVE  NEG

## 2020-06-12 NOTE — PROGRESS NOTES
53 y/o  presents to the office for concern of intermittent pain at the umbilicus. She states she notes pain 3 or so times per week. She notes a hard lump at the supraumbilical incision area that is only relieved with rest.  She denies any pain at the other incisions. She denies N/V/ fever or vaginal bleeding or discharge. She has chronic constipation since the surgery, but thinks this is related to her iron pills. Visit Vitals  /90   Pulse 61   Ht 5' 3\" (1.6 m)   Wt 169 lb (76.7 kg)   LMP 2019   BMI 29.94 kg/m²     Abdomen: well healed incisions x 4. No palpable mass, but noted tenderness to supra-umbilical incision. Soft, NT, ND  SVE: deferred    A/P: Post-incision pain after hysterectomy. CT ordered to assess for hernia. Plan to be determined. The plan of care has been discussed with the patient. She has been given the opportunity to ask questions, which seem to have been answered satisfactorily.

## 2020-06-15 ENCOUNTER — TELEPHONE (OUTPATIENT)
Dept: FAMILY MEDICINE CLINIC | Age: 50
End: 2020-06-15

## 2020-06-15 DIAGNOSIS — R05.9 COUGH: ICD-10-CM

## 2020-06-15 DIAGNOSIS — R06.2 WHEEZING: ICD-10-CM

## 2020-06-15 DIAGNOSIS — R07.9 CHEST PAIN, UNSPECIFIED TYPE: ICD-10-CM

## 2020-06-15 DIAGNOSIS — R06.09 DYSPNEA ON EXERTION: Primary | ICD-10-CM

## 2020-06-15 NOTE — TELEPHONE ENCOUNTER
Patient contacted to let her that chest xray was normal.Patient stated that she does not understand why she continues to cough, and when she coughs she has chest pains. She stated that the cough has gotten better with use of cough medication, and that she continues to use both her inhalers; but still has the chest pains.  Please advise

## 2020-07-03 DIAGNOSIS — R06.2 WHEEZING: ICD-10-CM

## 2020-07-14 ENCOUNTER — HOSPITAL ENCOUNTER (OUTPATIENT)
Dept: MAMMOGRAPHY | Age: 50
Discharge: HOME | End: 2020-07-14
Attending: NURSE PRACTITIONER
Payer: MEDICAID

## 2020-07-14 DIAGNOSIS — Z12.39 BREAST CANCER SCREENING: ICD-10-CM

## 2020-07-14 PROCEDURE — 77063 BREAST TOMOSYNTHESIS BI: CPT

## 2020-07-20 RX ORDER — ALBUTEROL SULFATE 90 UG/1
AEROSOL, METERED RESPIRATORY (INHALATION)
Qty: 8.5 INHALER | Refills: 0 | Status: SHIPPED | OUTPATIENT
Start: 2020-07-20 | End: 2020-09-09

## 2020-08-04 ENCOUNTER — TELEPHONE (OUTPATIENT)
Dept: FAMILY MEDICINE CLINIC | Age: 50
End: 2020-08-04

## 2020-08-12 ENCOUNTER — VIRTUAL VISIT (OUTPATIENT)
Dept: FAMILY MEDICINE CLINIC | Age: 50
End: 2020-08-12

## 2020-08-12 DIAGNOSIS — L70.0 ACNE VULGARIS: Primary | ICD-10-CM

## 2020-08-12 RX ORDER — CLINDAMYCIN PHOSPHATE AND BENZOYL PEROXIDE 10; 50 MG/G; MG/G
GEL TOPICAL
Qty: 1 TUBE | Refills: 0 | Status: SHIPPED | OUTPATIENT
Start: 2020-08-12

## 2020-08-12 NOTE — PROGRESS NOTES
Alphonse Arellano is a 48 y.o. female who was seen by synchronous (real-time) audio-video technology using doxy. me on 8/12/2020. Location of the patient: Home    Location of the provider: [facility]    Consent:  She and/or health care decision maker is aware that that she may receive a bill for this telehealth service, depending on her insurance coverage, and has provided verbal consent to proceed: Yes    Subjective:   Alphonse Arellano is a 48 y.o. female who presents today for management of    Chief Complaint   Patient presents with    Rash       Patient of LEYDA Goddard    Rash  Patient presents for evaluation of acne. Onset was several months ago. Symptoms have been gradually worsening. Lesions are described as pink, papules, pustules. Acne is primarily located on the forehead, cheeks and chin. The patient also describes previous hysterectomy. Treatment to date has included none. Problem List  Patient Active Problem List    Diagnosis Date Noted    Neuropraxia of right lower extremity 06/07/2019    S/P hysterectomy 06/05/2019    Essential hypertension 05/21/2019    Menometrorrhagia 05/21/2019    Iron deficiency anemia due to chronic blood loss 05/21/2019       Current Medications  Current Outpatient Medications   Medication Sig    clindamycin-benzoyl Peroxide (DUAC) 1.2 %(1 % base) -5 % SR topical gel Apply  to affected area nightly.  ProAir HFA 90 mcg/actuation inhaler TAKE 1 PUFF BY INHALATION EVERY SIX HOURS AS NEEDED FOR WHEEZING OR SHORTNESS OF BREATH.  amLODIPine (NORVASC) 5 mg tablet Take 1 Tab by mouth daily.  ibuprofen (MOTRIN) 800 mg tablet Take 1 Tab by mouth every eight (8) hours as needed for Pain. Indications: Pain    ferrous sulfate (IRON) 325 mg (65 mg iron) EC tablet Take 1 Tab by mouth three (3) times daily (with meals). No current facility-administered medications for this visit.         Allergies/Drug Reactions  No Known Allergies     Social History  Social History     Tobacco Use    Smoking status: Never Smoker    Smokeless tobacco: Never Used   Substance Use Topics    Alcohol use: Yes     Frequency: Monthly or less     Drinks per session: 3 or 4     Binge frequency: Less than monthly     Comment: occasional    Drug use: No        Review of Systems  Review of Systems   Constitutional: Negative for chills, fever and weight loss. Respiratory: Negative for shortness of breath. Cardiovascular: Negative for chest pain, palpitations and leg swelling. Gastrointestinal: Negative for abdominal pain, heartburn, nausea and vomiting. Skin: Positive for rash. Neurological: Negative for dizziness and headaches. Objective:     General: alert, cooperative, no distress   Mental  status: mental status: alert, oriented to person, place, and time, normal mood, behavior, speech, dress, motor activity, and thought processes   Resp: resp: normal effort and no respiratory distress   Neuro: neuro: no gross deficits   Skin: skin: LESIONS NOTED: multiple papulopustular lesions on the cheeks and chin     Due to this being a TeleHealth evaluation, many elements of the physical examination are unable to be assessed. Assessment & Plan:   1. Acne vulgaris  - clindamycin-benzoyl Peroxide (DUAC) 1.2 %(1 % base) -5 % SR topical gel; Apply  to affected area nightly. Dispense: 1 Tube; Refill: 0      Follow-up and Dispositions    · Return in about 1 month (around 9/12/2020) for follow-up with PCP. We discussed the expected course, resolution and complications of the diagnosis(es) in detail. Medication risks, benefits, costs, interactions, and alternatives were discussed as indicated. I advised her to contact the office if her condition worsens, changes or fails to improve as anticipated. She expressed understanding with the diagnosis(es) and plan.          Pursuant to the emergency declaration under the [facility], [facility] authority and the [employer] and [employer] Act, this Virtual  Visit was conducted, with patient's consent, to reduce the patient's risk of exposure to COVID-19 and provide continuity of care for an established patient. Services were provided through a video synchronous discussion virtually to substitute for in-person clinic visit.     Sole Schulte MD

## 2020-08-26 ENCOUNTER — VIRTUAL VISIT (OUTPATIENT)
Dept: FAMILY MEDICINE CLINIC | Age: 50
End: 2020-08-26

## 2020-08-26 DIAGNOSIS — J06.9 UPPER RESPIRATORY TRACT INFECTION, UNSPECIFIED TYPE: Primary | ICD-10-CM

## 2020-08-31 NOTE — PROGRESS NOTES
Frederic Zurita was seen by synchronous (real-time) audio-video technology DOXY on 2020. Consent: Frederic Zurita, who was seen by synchronous (real-time) audio-video technology, and/or her healthcare decision maker, is aware that this patient-initiated, Telehealth encounter on 2020 is a billable service, with coverage as determined by her insurance carrier. She is aware that she may receive a bill and has provided verbal consent to proceed: yes  712  Subjective:     HPI:  Frederic Zurita is a 48 y.o. female who was seen for the followin day history of cough, sore throat, white spots on throat, ear pain, tinnitus, fever of 103 the first night, up and down since then, mild shortness of breath, low appetite, fatigue. The cough has actually been chronic since she had the flu in March. She works in customer service at [employer] but no known exposures to infectious disease.          Review of Systems   Constitutional: Positive for appetite change and fever. Negative for chills, diaphoresis and fatigue. HENT: Positive for congestion, ear pain, sore throat and tinnitus. Negative for ear discharge, hearing loss, postnasal drip, rhinorrhea, sinus pressure, sinus pain, sneezing and trouble swallowing. Eyes: Negative for discharge, redness and visual disturbance. Respiratory: Positive for cough and shortness of breath. Negative for chest tightness and wheezing. Cardiovascular: Negative for chest pain. Gastrointestinal: Negative for abdominal pain, diarrhea, nausea and vomiting. Genitourinary: Negative for decreased urine volume. Musculoskeletal: Negative for myalgias, neck pain and neck stiffness. Skin: Negative for rash. Allergic/Immunologic: Negative for environmental allergies. Neurological: Negative for dizziness, light-headedness and headaches. Past Medical History, Past Surgery History, Allergies, Social History, and Family History were reviewed and updated.       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     Past Medical History:   Diagnosis Date    Essential hypertension 2019    Iron deficiency anemia due to chronic blood loss 2019    Low iron     Neuropraxia of right lower extremity 2019     Patient Care Team:  Neal Meza NP as PCP - General (Family Medicine)  Neal Meza [facility]    Past Surgical History:   Procedure Laterality Date    HX  SECTION      HX TOTAL LAPAROSCOPIC HYSTERECTOMY       Family History   Problem Relation Age of Onset    Breast Cancer Mother         52's    Breast Cancer Sister         52's    No Known Problems Father      Social History     Tobacco Use    Smoking status: Never Smoker    Smokeless tobacco: Never Used   Substance Use Topics    Alcohol use: Yes     Frequency: Monthly or less     Drinks per session: 3 or 4     Binge frequency: Less than monthly     Comment: occasional    Drug use: No     No Known Allergies  Current Outpatient Medications on File Prior to Visit   Medication Sig Dispense Refill    clindamycin-benzoyl Peroxide (DUAC) 1.2 %(1 % base) -5 % SR topical gel Apply  to affected area nightly. 1 Tube 0    ProAir HFA 90 mcg/actuation inhaler TAKE 1 PUFF BY INHALATION EVERY SIX HOURS AS NEEDED FOR WHEEZING OR SHORTNESS OF BREATH. 8.5 Inhaler 0    amLODIPine (NORVASC) 5 mg tablet Take 1 Tab by mouth daily. 90 Tab 1    ibuprofen (MOTRIN) 800 mg tablet Take 1 Tab by mouth every eight (8) hours as needed for Pain. Indications: Pain 30 Tab 0    ferrous sulfate (IRON) 325 mg (65 mg iron) EC tablet Take 1 Tab by mouth three (3) times daily (with meals). 90 Tab 3     No current facility-administered medications on file prior to visit.       Health Maintenance Due   Topic Date Due    Shingrix Vaccine Age 50> (1 of 2) 07/13/2020    FOBT Q1Y Age 54-65  07/13/2020         Objective     PHYSICAL EXAMINATION:    Vital Signs: (As obtained by patient/caregiver at home)   No flowsheet data found. General: Alert, cooperative, no distress   Mental  status: Normal mood, behavior, speech, dress, motor activity, and thought processes, able to follow commands   HENT: Normocephalic, atraumatic   Neck: No visualized mass   Resp: No respiratory distress   Neuro: No gross deficits   Skin: No discoloration or lesions of concern on visible areas   Psychiatric: Normal affect, consistent with stated mood, no evidence of hallucinations     Additional exam findings: Throat without significant erythema, swelling, or exudates        Assessment and Plan     1. Upper respiratory tract infection, unspecified type   May use over-the-counter decongestants, nasal sprays, analgesics, and cough suppressants as needed. Follow up if symptoms worsen or fail to resolve over the next 1-2 weeks. If severe SOB, ER. If symptoms continue after a week, follow up and will consider antibiotic. 712  We discussed the expected course, resolution and complications of the diagnosis(es) in detail. Medication risks, benefits, costs, interactions, and alternatives were discussed as indicated. I advised her to contact the office if her condition worsens, changes or fails to improve as anticipated. She expressed understanding with the diagnosis(es) and plan. Luis Tellez is a 48 y.o. female who was evaluated by a video visit encounter for concerns as above. Patient identification was verified prior to start of the visit. A caregiver was present when appropriate. Due to this being a TeleHealth encounter (During [place]UXT-93 public health emergency), evaluation of the following organ systems was limited: Vitals/Constitutional/EENT/Resp/CV/GI//MS/Neuro/Skin/Heme-Lymph-Imm.   Pursuant to the emergency declaration under the [facility], [facility] waiver authority and the [employer] and [employer] Act, this Virtual  Visit was conducted, with patient's (and/or legal guardian's) consent, to reduce the patient's risk of exposure to COVID-19 and provide necessary medical care. Services were provided through a video synchronous discussion virtually to substitute for in-person clinic visit. Patient was located at home and provider was located at home.       Signed electronically by Oumou Montero DNP, FRANCHESCA-BC

## 2020-09-08 DIAGNOSIS — R06.2 WHEEZING: ICD-10-CM

## 2020-09-10 RX ORDER — ALBUTEROL SULFATE 90 UG/1
AEROSOL, METERED RESPIRATORY (INHALATION)
Qty: 8.5 INHALER | Refills: 0 | Status: SHIPPED | OUTPATIENT
Start: 2020-09-10 | End: 2021-01-14

## 2020-09-30 ENCOUNTER — VIRTUAL VISIT (OUTPATIENT)
Dept: FAMILY MEDICINE CLINIC | Age: 50
End: 2020-09-30
Payer: MEDICAID

## 2020-09-30 DIAGNOSIS — B96.89 BACTERIAL SINUSITIS: ICD-10-CM

## 2020-09-30 DIAGNOSIS — B00.1 HERPES LABIALIS: ICD-10-CM

## 2020-09-30 DIAGNOSIS — I10 ESSENTIAL HYPERTENSION: ICD-10-CM

## 2020-09-30 DIAGNOSIS — J32.9 BACTERIAL SINUSITIS: ICD-10-CM

## 2020-09-30 DIAGNOSIS — Z12.11 SCREENING FOR COLON CANCER: Primary | ICD-10-CM

## 2020-09-30 DIAGNOSIS — L70.0 ACNE VULGARIS: ICD-10-CM

## 2020-09-30 PROCEDURE — 99214 OFFICE O/P EST MOD 30 MIN: CPT | Performed by: NURSE PRACTITIONER

## 2020-09-30 RX ORDER — VALACYCLOVIR HYDROCHLORIDE 1 G/1
1000 TABLET, FILM COATED ORAL 2 TIMES DAILY
Qty: 6 TAB | Refills: 5 | Status: SHIPPED | OUTPATIENT
Start: 2020-09-30 | End: 2020-10-03

## 2020-09-30 RX ORDER — AMOXICILLIN AND CLAVULANATE POTASSIUM 875; 125 MG/1; MG/1
1 TABLET, FILM COATED ORAL EVERY 12 HOURS
Qty: 20 TAB | Refills: 0 | Status: SHIPPED | OUTPATIENT
Start: 2020-09-30 | End: 2020-10-10

## 2020-09-30 RX ORDER — ADAPALENE 45 G/G
GEL TOPICAL
Qty: 45 G | Refills: 0 | Status: SHIPPED | OUTPATIENT
Start: 2020-09-30 | End: 2021-01-11

## 2020-09-30 RX ORDER — AMLODIPINE BESYLATE 5 MG/1
5 TABLET ORAL DAILY
Qty: 90 TAB | Refills: 1 | Status: SHIPPED | OUTPATIENT
Start: 2020-09-30 | End: 2021-01-19

## 2020-09-30 NOTE — PROGRESS NOTES
Cici Beverly was seen by synchronous (real-time) audio-video technology DOXY on 9/30/2020. Consent: Cici Beverly, who was seen by synchronous (real-time) audio-video technology, and/or her healthcare decision maker, is aware that this patient-initiated, Telehealth encounter on 9/30/2020 is a billable service, with coverage as determined by her insurance carrier. She is aware that she may receive a bill and has provided verbal consent to proceed: yes  712  Subjective:     HPI:  Cici Beverly is a 48 y.o. female who was seen for the following:     Respiratory symptoms:   Her ear pain and cough has continued on and off for the last month, along with facial pain and pressure. Went to  and was given tessalon which does help with the cough, but the other symptoms continue. Cold sore:   Border of her lower lip. Started a few days ago with pain and bumps. Has had the lesions in the same spot before. Acne: On her cheeks and jaw line. Clindamycin topical medication was not approved. Review of Systems   Constitutional: Negative for appetite change, chills, diaphoresis, fatigue, fever and unexpected weight change. HENT: Positive for ear pain, sinus pressure and sinus pain. Negative for congestion, ear discharge, hearing loss, postnasal drip, rhinorrhea, sneezing, sore throat and trouble swallowing. Eyes: Negative for discharge, redness and visual disturbance. Respiratory: Positive for cough. Negative for chest tightness, shortness of breath and wheezing. Cardiovascular: Negative for chest pain, palpitations and leg swelling. Gastrointestinal: Negative for abdominal distention, abdominal pain, blood in stool, constipation, diarrhea, nausea, rectal pain and vomiting. Endocrine: Negative for polydipsia, polyphagia and polyuria. Genitourinary: Negative for decreased urine volume, dysuria and frequency.    Musculoskeletal: Negative for joint swelling, myalgias, neck pain and neck stiffness. Skin: Positive for rash. Negative for wound. Allergic/Immunologic: Negative for environmental allergies. Neurological: Negative for dizziness, weakness, light-headedness, numbness and headaches. Psychiatric/Behavioral: Negative for dysphoric mood and sleep disturbance. The patient is not nervous/anxious. Past Medical History, Past Surgery History, Allergies, Social History, and Family History were reviewed and updated. 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 Empaneled Provider    Past Surgical History:   Procedure Laterality Date    HX  SECTION      HX TOTAL LAPAROSCOPIC HYSTERECTOMY       Family History   Problem Relation Age of Onset    Breast Cancer Mother         52's    Breast Cancer Sister         52's    No Known Problems Father      Social History     Tobacco Use    Smoking status: Never Smoker    Smokeless tobacco: Never Used   Substance Use Topics    Alcohol use: Yes     Frequency: Monthly or less     Drinks per session: 3 or 4     Binge frequency: Less than monthly     Comment: occasional    Drug use: No     No Known Allergies  Current Outpatient Medications on File Prior to Visit   Medication Sig Dispense Refill    ProAir HFA 90 mcg/actuation inhaler TAKE 1 PUFF BY INHALATION EVERY SIX HOURS AS NEEDED FOR WHEEZING OR SHORTNESS OF BREATH. 8.5 Inhaler 0    clindamycin-benzoyl Peroxide (DUAC) 1.2 %(1 % base) -5 % SR topical gel Apply  to affected area nightly. 1 Tube 0    [DISCONTINUED] amLODIPine (NORVASC) 5 mg tablet Take 1 Tab by mouth daily. 90 Tab 1    ibuprofen (MOTRIN) 800 mg tablet Take 1 Tab by mouth every eight (8) hours as needed for Pain. Indications: Pain 30 Tab 0    ferrous sulfate (IRON) 325 mg (65 mg iron) EC tablet Take 1 Tab by mouth three (3) times daily (with meals). 90 Tab 3     No current facility-administered medications on file prior to visit. Health Maintenance Due   Topic Date Due    Colonoscopy  07/13/1988    Shingrix Vaccine Age 50> (1 of 2) 07/13/2020    Flu Vaccine (1) 09/01/2020         Objective     PHYSICAL EXAMINATION:    Vital Signs: (As obtained by patient/caregiver at home)   No flowsheet data found. General: Alert, cooperative, no distress   Mental  status: Normal mood, behavior, speech, dress, motor activity, and thought processes, able to follow commands   HENT: Normocephalic, atraumatic   Neck: No visualized mass   Resp: No respiratory distress   Neuro: No gross deficits   Skin: No discoloration or lesions of concern on visible areas   Psychiatric: Normal affect, consistent with stated mood, no evidence of hallucinations     Additional exam findings: moderate facial acne scattered over both cheeks. Small raised lesions on the lower lip. Assessment and Plan     1. Essential hypertension  Asymptomatic. Continue current meds. - amLODIPine (NORVASC) 5 mg tablet; Take 1 Tab by mouth daily. Dispense: 90 Tab; Refill: 1    2. Screening for colon cancer  - REFERRAL TO GASTROENTEROLOGY    3. Bacterial sinusitis  - amoxicillin-clavulanate (AUGMENTIN) 875-125 mg per tablet; Take 1 Tab by mouth every twelve (12) hours for 10 days. Dispense: 20 Tab; Refill: 0    4. Acne vulgaris  - adapalene (DIFFERIN) 0.1 % topical gel; Apply  to affected area nightly. use small amount as directed  Dispense: 45 g; Refill: 0    5. Herpes labialis  - valACYclovir (VALTREX) 1 gram tablet;  Take 1 Tab by mouth two (2) times a day for 3 days. Dispense: 6 Tab; Refill: 5      Follow-up and Dispositions    · Return if symptoms worsen or fail to improve. 712  We discussed the expected course, resolution and complications of the diagnosis(es) in detail. Medication risks, benefits, costs, interactions, and alternatives were discussed as indicated. I advised her to contact the office if her condition worsens, changes or fails to improve as anticipated. She expressed understanding with the diagnosis(es) and plan. Mela Severance is a 48 y.o. female who was evaluated by a video visit encounter for concerns as above. Patient identification was verified prior to start of the visit. A caregiver was present when appropriate. Due to this being a TeleHealth encounter (During [facility] public health emergency), evaluation of the following organ systems was limited: Vitals/Constitutional/EENT/Resp/CV/GI//MS/Neuro/Skin/Heme-Lymph-Imm. Pursuant to the emergency declaration under the [facility], [facility] include 234 beds at the [facility] waiver authority and the [employer] and [employer] Act, this Virtual  Visit was conducted, with patient's (and/or legal guardian's) consent, to reduce the patient's risk of exposure to COVID-19 and provide necessary medical care. Services were provided through a video synchronous discussion virtually to substitute for in-person clinic visit. Patient was located at home and provider was located at home.       Signed electronically by Nancy Gaucher, DNP, FNP-BC

## 2020-10-20 ENCOUNTER — TELEPHONE (OUTPATIENT)
Dept: FAMILY MEDICINE CLINIC | Age: 50
End: 2020-10-20

## 2020-10-20 NOTE — TELEPHONE ENCOUNTER
The menopausal symptoms could be related to having a hysterectomy. Schedule a follow up as needed to discuss the symptoms and possible treatments.

## 2020-10-20 NOTE — TELEPHONE ENCOUNTER
Pt. Stated she has been having nightly hot flashes. She stated she was prescribed Robitussin and she stated it worked for her hot flashes. She stated she had a hysterectomy and is wondering if that is causing her to have menopausal symptoms. Please advise.

## 2020-10-28 ENCOUNTER — VIRTUAL VISIT (OUTPATIENT)
Dept: FAMILY MEDICINE CLINIC | Age: 50
End: 2020-10-28
Payer: MEDICAID

## 2020-10-28 DIAGNOSIS — R61 DIAPHORESIS: ICD-10-CM

## 2020-10-28 DIAGNOSIS — R06.09 DYSPNEA ON EXERTION: ICD-10-CM

## 2020-10-28 DIAGNOSIS — R53.83 FATIGUE, UNSPECIFIED TYPE: ICD-10-CM

## 2020-10-28 DIAGNOSIS — I10 ESSENTIAL HYPERTENSION: ICD-10-CM

## 2020-10-28 DIAGNOSIS — I10 ESSENTIAL HYPERTENSION: Primary | ICD-10-CM

## 2020-10-28 PROCEDURE — 99213 OFFICE O/P EST LOW 20 MIN: CPT | Performed by: NURSE PRACTITIONER

## 2020-10-28 NOTE — PROGRESS NOTES
Bossman Robertson was seen by synchronous (real-time) audio-video technology DOXY on 10/28/2020. Consent: Bossman Robertson, who was seen by synchronous (real-time) audio-video technology, and/or her healthcare decision maker, is aware that this patient-initiated, Telehealth encounter on 10/28/2020 is a billable service, with coverage as determined by her insurance carrier. She is aware that she may receive a bill and has provided verbal consent to proceed: yes  712  Subjective:     HPI:  Bossman Robertson is a 48 y.o. female who was seen for the following:       Night sweats:   Onset: 1 week ago. Dyspnea: Onset was at least 4 months ago. It occurs with minimal activity. She has awakened with the symptoms. Having ankle swelling. Energy is low at times. She also had a cough for several months but that has now mostly resolved. She had a normal chest xray 6/11/20. Her last labs on file were from 6/29/20. She was anemic at the time. Hgb was 8.7. She did have a hysterectomy last year. Review of Systems   Constitutional: Positive for diaphoresis and fatigue. Negative for appetite change, chills and fever. HENT: Negative for congestion, ear discharge, ear pain, hearing loss, postnasal drip, rhinorrhea, sinus pressure, sinus pain, sneezing, sore throat and trouble swallowing. Eyes: Negative for discharge, redness and visual disturbance. Respiratory: Positive for cough and shortness of breath. Negative for chest tightness and wheezing. Cardiovascular: Positive for leg swelling. Negative for chest pain and palpitations. Gastrointestinal: Negative for abdominal pain, diarrhea, nausea and vomiting. Genitourinary: Negative for decreased urine volume. Musculoskeletal: Negative for myalgias, neck pain and neck stiffness. Skin: Negative for rash. Allergic/Immunologic: Negative for environmental allergies. Neurological: Negative for dizziness, light-headedness and headaches.         Past Medical History, Past Surgery History, Allergies, Social History, and Family History were reviewed and updated. 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     Past Medical History:   Diagnosis Date    Essential hypertension 2019    Iron deficiency anemia due to chronic blood loss 2019    Low iron     Neuropraxia of right lower extremity 2019     Patient Care Team:  Dimitri Warren NP as PCP - General (Family Medicine)  Dimitri Warren NP as PCP - [facility]    Past Surgical History:   Procedure Laterality Date    HX  SECTION      HX TOTAL LAPAROSCOPIC HYSTERECTOMY       Family History   Problem Relation Age of Onset    Breast Cancer Mother         52's    Breast Cancer Sister         52's    No Known Problems Father      Social History     Tobacco Use    Smoking status: Never Smoker    Smokeless tobacco: Never Used   Substance Use Topics    Alcohol use: Yes     Frequency: Monthly or less     Drinks per session: 3 or 4     Binge frequency: Less than monthly     Comment: occasional    Drug use: No     No Known Allergies  Current Outpatient Medications on File Prior to Visit   Medication Sig Dispense Refill    adapalene (DIFFERIN) 0.1 % topical gel Apply  to affected area nightly. use small amount as directed 45 g 0    amLODIPine (NORVASC) 5 mg tablet Take 1 Tab by mouth daily. 90 Tab 1    ProAir HFA 90 mcg/actuation inhaler TAKE 1 PUFF BY INHALATION EVERY SIX HOURS AS NEEDED FOR WHEEZING OR SHORTNESS OF BREATH. 8.5 Inhaler 0    clindamycin-benzoyl Peroxide (DUAC) 1.2 %(1 % base) -5 % SR topical gel Apply  to affected area nightly. 1 Tube 0    ibuprofen (MOTRIN) 800 mg tablet Take 1 Tab by mouth every eight (8) hours as needed for Pain.  Indications: Pain 30 Tab 0    ferrous sulfate (IRON) 325 mg (65 mg iron) EC tablet Take 1 Tab by mouth three (3) times daily (with meals). 90 Tab 3     No current facility-administered medications on file prior to visit. Health Maintenance Due   Topic Date Due    Shingrix Vaccine Age 49> (1 of 2) 07/13/2020    Colorectal Cancer Screening Combo  07/13/2020    Flu Vaccine (1) 09/01/2020         Objective     PHYSICAL EXAMINATION:    Vital Signs: (As obtained by patient/caregiver at home)   No flowsheet data found. General: Alert, cooperative, no distress   Mental  status: Normal mood, behavior, speech, dress, motor activity, and thought processes, able to follow commands   HENT: Normocephalic, atraumatic   Neck: No visualized mass   Resp: No respiratory distress   Neuro: No gross deficits   Skin: No discoloration or lesions of concern on visible areas   Psychiatric: Normal affect, consistent with stated mood, no evidence of hallucinations     Additional exam findings: none      LABS   Component      Latest Ref Rng & Units 6/29/2019 6/29/2019           9:30 PM  9:30 PM   WBC      4.6 - 13.2 K/uL     RBC      4.20 - 5.30 M/uL     HGB      12.0 - 16.0 g/dL     HCT      35.0 - 45.0 %     MCV      74.0 - 97.0 FL     MCH      24.0 - 34.0 PG     MCHC      31.0 - 37.0 g/dL     RDW      11.6 - 14.5 %     PLATELET      716 - 873 K/uL     MPV      9.2 - 11.8 FL     NEUTROPHILS      40 - 73 %     LYMPHOCYTES      21 - 52 %     MONOCYTES      3 - 10 %     EOSINOPHILS      0 - 5 %     BASOPHILS      0 - 2 %     ABS. NEUTROPHILS      1.8 - 8.0 K/UL     ABS. LYMPHOCYTES      0.9 - 3.6 K/UL     ABS. MONOCYTES      0.05 - 1.2 K/UL     ABS. EOSINOPHILS      0.0 - 0.4 K/UL     ABS. BASOPHILS      0.0 - 0.1 K/UL     PLATELET COMMENTS           RBC COMMENTS       TARGET CELLS . . . MICROCYTOSIS .  . Leontine Golas           Sodium      136 - 145 mmol/L     Potassium      3.5 - 5.5 mmol/L     Chloride      100 - 108 mmol/L     CO2      21 - 32 mmol/L     Anion gap      3.0 - 18 mmol/L     Glucose      74 - 99 mg/dL     BUN      7.0 - 18 MG/DL     Creatinine      0.6 - 1.3 MG/DL     BUN/Creatinine ratio      12 - 20       GFR est AA      >60 ml/min/1.73m2     GFR est non-AA      >60 ml/min/1.73m2     Calcium      8.5 - 10.1 MG/DL     Bilirubin, total      0.2 - 1.0 MG/DL     ALT      13 - 56 U/L     AST      15 - 37 U/L     Alk. phosphatase      45 - 117 U/L     Protein, total      6.4 - 8.2 g/dL     Albumin      3.4 - 5.0 g/dL     Globulin      2.0 - 4.0 g/dL     A-G Ratio      0.8 - 1.7       Color           Appearance           Specific gravity      1.005 - 1.030       pH (UA)      5.0 - 8.0       Protein      NEG mg/dL     Glucose      NEG mg/dL     Ketone      NEG mg/dL     Bilirubin      NEG       Blood      NEG       Urobilinogen      0.2 - 1.0 EU/dL     Nitrites      NEG       Leukocyte Esterase      NEG         Component      Latest Ref Rng & Units 6/29/2019 6/29/2019 6/29/2019           9:30 PM  9:30 PM  9:30 PM   WBC      4.6 - 13.2 K/uL 8.4     RBC      4.20 - 5.30 M/uL 4.30     HGB      12.0 - 16.0 g/dL 8.7 (L)     HCT      35.0 - 45.0 % 29.8 (L)     MCV      74.0 - 97.0 FL 69.3 (L)     MCH      24.0 - 34.0 PG 20.2 (L)     MCHC      31.0 - 37.0 g/dL 29.2 (L)     RDW      11.6 - 14.5 % 20.0 (H)     PLATELET      508 - 371 K/uL 477 (H)     MPV      9.2 - 11.8 FL 10.6     NEUTROPHILS      40 - 73 % 56     LYMPHOCYTES      21 - 52 % 28     MONOCYTES      3 - 10 % 9     EOSINOPHILS      0 - 5 % 5     BASOPHILS      0 - 2 % 2     ABS. NEUTROPHILS      1.8 - 8.0 K/UL 4.6     ABS. LYMPHOCYTES      0.9 - 3.6 K/UL 2.4     ABS. MONOCYTES      0.05 - 1.2 K/UL 0.8     ABS. EOSINOPHILS      0.0 - 0.4 K/UL 0.4     ABS. BASOPHILS      0.0 - 0.1 K/UL 0.2 (H)     PLATELET COMMENTS       Increased Platelets     RBC COMMENTS       ANISOCYTOSIS . . .      DF       MANUAL     Sodium      136 - 145 mmol/L  142    Potassium      3.5 - 5.5 mmol/L  3.7    Chloride      100 - 108 mmol/L 106    CO2      21 - 32 mmol/L  29    Anion gap      3.0 - 18 mmol/L  7    Glucose      74 - 99 mg/dL  79    BUN      7.0 - 18 MG/DL  8    Creatinine      0.6 - 1.3 MG/DL  0.72    BUN/Creatinine ratio      12 - 20    11 (L)    GFR est AA      >60 ml/min/1.73m2  >60    GFR est non-AA      >60 ml/min/1.73m2  >60    Calcium      8.5 - 10.1 MG/DL  9.1    Bilirubin, total      0.2 - 1.0 MG/DL  0.2    ALT      13 - 56 U/L  18    AST      15 - 37 U/L  14 (L)    Alk. phosphatase      45 - 117 U/L  91    Protein, total      6.4 - 8.2 g/dL  7.5    Albumin      3.4 - 5.0 g/dL  3.6    Globulin      2.0 - 4.0 g/dL  3.9    A-G Ratio      0.8 - 1.7    0.9    Color         YELLOW   Appearance         CLEAR   Specific gravity      1.005 - 1.030     1.012   pH (UA)      5.0 - 8.0     6.5   Protein      NEG mg/dL   NEGATIVE   Glucose      NEG mg/dL   NEGATIVE   Ketone      NEG mg/dL   NEGATIVE   Bilirubin      NEG     NEGATIVE   Blood      NEG     NEGATIVE   Urobilinogen      0.2 - 1.0 EU/dL   1.0   Nitrites      NEG     NEGATIVE   Leukocyte Esterase      NEG     NEGATIVE     TESTS  Study Result     EXAM: XR CHEST PA LAT     CLINICAL INDICATION/HISTORY: Flu. Shortness of breath and cough.  -Additional: None     COMPARISON: None     TECHNIQUE: PA and lateral views of the chest     _______________     FINDINGS:     HEART AND MEDIASTINUM: Cardiac size and mediastinal contours are within normal  limits     LUNGS AND PLEURAL SPACES: No focal pneumonic consolidation, pneumothorax or  pleural effusion     BONY THORAX AND SOFT TISSUES: No acute osseous abnormality     _______________     IMPRESSION  IMPRESSION:     No acute findings in the chest.   Imaging     XR CHEST PA LAT (Order: [id number]) - 6/11/2020       Assessment and Plan     1. Essential hypertension  Etiology of her symptoms is unclear. Differential includes menopause, anemia, heart failure, respiratory infection, asthma, COPD, sleep apnea.    Start with labs today, then further plan after results. - CBC WITH AUTOMATED DIFF; Future  - METABOLIC PANEL, COMPREHENSIVE; Future  - TSH 3RD GENERATION; Future  - URINALYSIS W/ RFLX MICROSCOPIC; Future    2. Diaphoresis  - CBC WITH AUTOMATED DIFF; Future  - METABOLIC PANEL, COMPREHENSIVE; Future  - TSH 3RD GENERATION; Future  - URINALYSIS W/ RFLX MICROSCOPIC; Future  - FOLLICLE STIMULATING HORMONE; Future  - ESTROGENS, FRACTIONATED; Future  - NT-PRO BNP; Future  - CULTURE, URINE; Future    3. Fatigue, unspecified type  - CBC WITH AUTOMATED DIFF; Future  - METABOLIC PANEL, COMPREHENSIVE; Future  - TSH 3RD GENERATION; Future  - URINALYSIS W/ RFLX MICROSCOPIC; Future  - NT-PRO BNP; Future  - CULTURE, URINE; Future    4. Dyspnea on exertion  - CBC WITH AUTOMATED DIFF; Future  - METABOLIC PANEL, COMPREHENSIVE; Future  - TSH 3RD GENERATION; Future  - URINALYSIS W/ RFLX MICROSCOPIC; Future  - NT-PRO BNP; Future      Follow-up and Dispositions    · Return for lab review. 712  We discussed the expected course, resolution and complications of the diagnosis(es) in detail. Medication risks, benefits, costs, interactions, and alternatives were discussed as indicated. I advised her to contact the office if her condition worsens, changes or fails to improve as anticipated. She expressed understanding with the diagnosis(es) and plan. Ashley Finnegan is a 48 y.o. female who was evaluated by a video visit encounter for concerns as above. Patient identification was verified prior to start of the visit. A caregiver was present when appropriate. Due to this being a TeleHealth encounter (During [facility]- public health emergency), evaluation of the following organ systems was limited: Vitals/Constitutional/EENT/Resp/CV/GI//MS/Neuro/Skin/Heme-Lymph-Imm.   Pursuant to the emergency declaration under the [facility], [address] [place] [address] authority and the [employer] Act, this Virtual  Visit was conducted, with patient's (and/or legal guardian's) consent, to reduce the patient's risk of exposure to COVID-19 and provide necessary medical care. Services were provided through a video synchronous discussion virtually to substitute for in-person clinic visit. Patient was located at home and provider was located at home.       Signed electronically by Aidan Schmid, TONI, FRANCHESCA-BC

## 2020-11-19 ENCOUNTER — TELEPHONE (OUTPATIENT)
Dept: FAMILY MEDICINE CLINIC | Age: 50
End: 2020-11-19

## 2020-11-19 NOTE — TELEPHONE ENCOUNTER
Pt called in and informed me that she was just told by her job that there were people who had tested positive for COVID and that she had been around one of them. She stated that she is getting tested today at [facility] and they won't have the results until Monday but she was wanting to know if the nurse or Jackalyn Cooks could call her back and also see if she could get a note for work stating that she shouldn't go back to work until she gets her results. Please advise.

## 2020-12-21 NOTE — PROGRESS NOTES
"Chief Complaint   Patient presents with     Ultrasound     Prenatal Care       Initial /86 (BP Location: Left arm, Patient Position: Sitting, Cuff Size: Adult Regular)   Pulse 91   Temp 99.4  F (37.4  C) (Temporal)   Ht 1.626 m (5' 4\")   Wt 76.3 kg (168 lb 4.8 oz)   LMP 10/17/2020   BMI 28.89 kg/m   Estimated body mass index is 28.89 kg/m  as calculated from the following:    Height as of this encounter: 1.626 m (5' 4\").    Weight as of this encounter: 76.3 kg (168 lb 4.8 oz).  BP completed using cuff size: regular    Questioned patient about current smoking habits.  Pt. has never smoked.          The following HM Due: NONE      The following patient reported/Care Every where data was sent to:  P ABSTRACT QUALITY INITIATIVES [[id number]]  Eye exam with ophthalmology on this date: 2020      n/a and patient has appointment for today              " Tona Kussmaul is a 52 y.o. female who was seen by synchronous (real-time) audio-video technology using . videoplatform: doxy. me on 4/3/2020. Location of the patient: Home    Location of the provider: [facility]    Consent:  She and/or health care decision maker is aware that that she may receive a bill for this telehealth service, depending on her insurance coverage, and has provided verbal consent to proceed: Yes    Subjective:   Tona Kussmaul is a 52 y.o. female who presents today for management of    Chief Complaint   Patient presents with    Cough       Cough  Patient complains of productive cough with sputum described as green. Symptoms began 4 weeks ago. The cough is associated with shortness of breath and fever, Tmax 101F. Patient does not have new pets. Patient does not have a history of asthma. Patient does not have a history of environmental allergens. Patient had a previous chest xray which was normal. She was treated for influenza one month ago. She has been on self-quarantine for almost 2 weeks. Problem List  Patient Active Problem List    Diagnosis Date Noted    Neuropraxia of right lower extremity 06/07/2019    S/P hysterectomy 06/05/2019    Essential hypertension 05/21/2019    Menometrorrhagia 05/21/2019    Iron deficiency anemia due to chronic blood loss 05/21/2019       Current Medications  Current Outpatient Medications   Medication Sig    albuterol (PROVENTIL HFA, VENTOLIN HFA, PROAIR HFA) 90 mcg/actuation inhaler Take 1-2 Puffs by inhalation.  oseltamivir (TAMIFLU) 75 mg capsule Take  by mouth.  amLODIPine (NORVASC) 5 mg tablet Take 1 Tab by mouth daily.  ibuprofen (MOTRIN) 800 mg tablet Take 1 Tab by mouth every eight (8) hours as needed for Pain. Indications: Pain    ferrous sulfate (IRON) 325 mg (65 mg iron) EC tablet Take 1 Tab by mouth three (3) times daily (with meals). No current facility-administered medications for this visit. Allergies/Drug Reactions  No Known Allergies     Social History  Social History     Socioeconomic History    Marital status: SINGLE     Spouse name: Not on file    Number of children: Not on file    Years of education: Not on file    Highest education level: Not on file   Occupational History    Not on file   Social Needs    Financial resource strain: Not on file    Food insecurity     Worry: Not on file     Inability: Not on file    Transportation needs     Medical: Not on file     Non-medical: Not on file   Tobacco Use    Smoking status: Never Smoker    Smokeless tobacco: Never Used   Substance and Sexual Activity    Alcohol use: Yes     Frequency: Monthly or less     Drinks per session: 3 or 4     Binge frequency: Less than monthly     Comment: occasional    Drug use: No    Sexual activity: Not Currently     Partners: Male     Birth control/protection: None   Lifestyle    Physical activity     Days per week: Not on file     Minutes per session: Not on file    Stress: Not on file   Relationships    Social connections     Talks on phone: Not on file     Gets together: Not on file     Attends Restorationist service: Not on file     Active member of club or organization: Not on file     Attends meetings of clubs or organizations: Not on file     Relationship status: Not on file    Intimate partner violence     Fear of current or ex partner: Not on file     Emotionally abused: Not on file     Physically abused: Not on file     Forced sexual activity: Not on file   Other Topics Concern    Not on file   Social History Narrative    Not on file       Review of Systems  Review of Systems   Constitutional: Positive for fever. Negative for chills and malaise/fatigue. HENT: Negative for congestion and sore throat. Respiratory: Positive for cough, sputum production and shortness of breath. Negative for wheezing. Cardiovascular: Negative for chest pain and palpitations. Gastrointestinal: Negative. Genitourinary: Negative. Musculoskeletal: Negative. Neurological: Negative for dizziness and headaches. Objective:     General: alert, cooperative, no distress   Mental  status: mental status: alert, oriented to person, place, and time, normal mood, behavior, speech, dress, motor activity, and thought processes   Resp: resp: normal effort and no respiratory distress   Neuro: neuro: no gross deficits   Skin: skin: no discoloration or lesions of concern on visible areas     Due to this being a TeleHealth evaluation, many elements of the physical examination are unable to be assessed. Assessment & Plan:       ICD-10-CM ICD-9-CM    1. Subacute cough R05 786.2    2. Acute upper respiratory infection J06.9 465.9      With persistent intermittent fever, cough and mild shortness of breath. Continue self-quarantine for another 7 days. Work note provided. Follow-up and Dispositions    · Return for after one week if not feeling better. We discussed the expected course, resolution and complications of the diagnosis(es) in detail. Medication risks, benefits, costs, interactions, and alternatives were discussed as indicated. I advised her to contact the office if her condition worsens, changes or fails to improve as anticipated. She expressed understanding with the diagnosis(es) and plan. Pursuant to the emergency declaration under the [facility], [facility] waiver authority and the [employer] and [employer] Act, this Virtual  Visit was conducted, with patient's consent, to reduce the patient's risk of exposure to COVID-19 and provide continuity of care for an established patient. Services were provided through a video synchronous discussion virtually to substitute for in-person clinic visit.     Shahriar Palumbo MD

## 2021-01-06 ENCOUNTER — VIRTUAL VISIT (OUTPATIENT)
Dept: FAMILY MEDICINE CLINIC | Age: 51
End: 2021-01-06
Payer: MEDICAID

## 2021-01-06 DIAGNOSIS — J06.9 UPPER RESPIRATORY TRACT INFECTION, UNSPECIFIED TYPE: Primary | ICD-10-CM

## 2021-01-06 PROCEDURE — 99213 OFFICE O/P EST LOW 20 MIN: CPT | Performed by: NURSE PRACTITIONER

## 2021-01-06 NOTE — PROGRESS NOTES
Hiram Wakefield was seen by synchronous (real-time) audio-video technology DOXY on 1/6/2021. Consent: Hiram Wakefield, who was seen by synchronous (real-time) audio-video technology, and/or her healthcare decision maker, is aware that this patient-initiated, Telehealth encounter on 1/6/2021 is a billable service, with coverage as determined by her insurance carrier. She is aware that she may receive a bill and has provided verbal consent to proceed: yes  712  Subjective:     HPI:  Hiram Wakefield is a 48 y.o. female who was seen for the following: Onset was 2 days ago. Sore throat, ear pain, dry cough, headaches, occasional shortness of breath (inhaler helps). Denies fever, chills. Her throat looks red. Took benadryl which did not help. Had tea and honey. Took one leftover dose of amoxicillin. Review of Systems   Constitutional: Negative for appetite change, chills, diaphoresis, fatigue and fever. HENT: Positive for ear pain and sore throat. Negative for congestion, ear discharge, hearing loss, postnasal drip, rhinorrhea, sinus pressure, sinus pain, sneezing and trouble swallowing. Eyes: Negative for discharge, redness and visual disturbance. Respiratory: Positive for cough and shortness of breath. Negative for chest tightness and wheezing. Cardiovascular: Negative for chest pain. Gastrointestinal: Negative for abdominal pain, diarrhea, nausea and vomiting. Genitourinary: Negative for decreased urine volume. Musculoskeletal: Negative for myalgias, neck pain and neck stiffness. Skin: Negative for rash. Allergic/Immunologic: Negative for environmental allergies. Neurological: Negative for dizziness, light-headedness and headaches. Past Medical History, Past Surgery History, Allergies, Social History, and Family History were reviewed and updated.       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     Past Medical History:   Diagnosis Date    Essential hypertension 2019    Iron deficiency anemia due to chronic blood loss 2019    Low iron     Neuropraxia of right lower extremity 2019     Patient Care Team:  Abhay Reza NP as PCP - General (Nurse Practitioner)  Janice Duong NP as PCP - [facility] Empaneled Provider    Past Surgical History:   Procedure Laterality Date    HX  SECTION      HX TOTAL LAPAROSCOPIC HYSTERECTOMY       Family History   Problem Relation Age of Onset    Breast Cancer Mother         52's    Breast Cancer Sister         52's    No Known Problems Father      Social History     Tobacco Use    Smoking status: Never Smoker    Smokeless tobacco: Never Used   Substance Use Topics    Alcohol use: Yes     Frequency: Monthly or less     Drinks per session: 3 or 4     Binge frequency: Less than monthly     Comment: occasional    Drug use: No     No Known Allergies  Current Outpatient Medications on File Prior to Visit   Medication Sig Dispense Refill    adapalene (DIFFERIN) 0.1 % topical gel Apply  to affected area nightly. use small amount as directed 45 g 0    amLODIPine (NORVASC) 5 mg tablet Take 1 Tab by mouth daily. 90 Tab 1    ProAir HFA 90 mcg/actuation inhaler TAKE 1 PUFF BY INHALATION EVERY SIX HOURS AS NEEDED FOR WHEEZING OR SHORTNESS OF BREATH. 8.5 Inhaler 0    clindamycin-benzoyl Peroxide (DUAC) 1.2 %(1 % base) -5 % SR topical gel Apply  to affected area nightly. 1 Tube 0    ibuprofen (MOTRIN) 800 mg tablet Take 1 Tab by mouth every eight (8) hours as needed for Pain. Indications: Pain 30 Tab 0    ferrous sulfate (IRON) 325 mg (65 mg iron) EC tablet Take 1 Tab by mouth three (3) times daily (with meals). 90 Tab 3     No current facility-administered medications on file prior to visit.       Health Maintenance Due Topic Date Due    Shingrix Vaccine Age 49> (1 of 2) 07/13/2020    Colorectal Cancer Screening Combo  07/13/2020    Flu Vaccine (1) 09/01/2020         Objective     PHYSICAL EXAMINATION:    Vital Signs: (As obtained by patient/caregiver at home)   No flowsheet data found. General: Alert, cooperative, no distress   Mental  status: Normal mood, behavior, speech, dress, motor activity, and thought processes, able to follow commands   HENT: Normocephalic, atraumatic   Neck: No visualized mass   Resp: No respiratory distress   Neuro: No gross deficits   Skin: No discoloration or lesions of concern on visible areas   Psychiatric: Normal affect, consistent with stated mood, no evidence of hallucinations     Additional exam findings: none      LABS     TESTS      Assessment and Plan     1. Upper respiratory tract infection, unspecified type  Recommended Coricidin HBP, Tylenol, and Flonase. She should isolate herself while symptomatic and may consider getting a COVID test. Follow up in about a week if symptoms continue. 712  We discussed the expected course, resolution and complications of the diagnosis(es) in detail. Medication risks, benefits, costs, interactions, and alternatives were discussed as indicated. I advised her to contact the office if her condition worsens, changes or fails to improve as anticipated. She expressed understanding with the diagnosis(es) and plan. Gisela Head is a 48 y.o. female who was evaluated by a video visit encounter for concerns as above. Patient identification was verified prior to start of the visit. A caregiver was present when appropriate. Due to this being a TeleHealth encounter (During AVELD-37 public health emergency), evaluation of the following organ systems was limited: Vitals/Constitutional/EENT/Resp/CV/GI//MS/Neuro/Skin/Heme-Lymph-Imm.   Pursuant to the emergency declaration under the [address], [address]32 waiver authority and the [employer] Act, this Virtual  Visit was conducted, with patient's (and/or legal guardian's) consent, to reduce the patient's risk of exposure to COVID-19 and provide necessary medical care. Services were provided through a video synchronous discussion virtually to substitute for in-person clinic visit. Patient was located at home and provider was located at home.       Signed electronically by Cliff Love DNP, FNMANUEL-BC

## 2021-01-11 DIAGNOSIS — R06.2 WHEEZING: ICD-10-CM

## 2021-01-11 DIAGNOSIS — L70.0 ACNE VULGARIS: ICD-10-CM

## 2021-01-11 RX ORDER — ADAPALENE 45 G/G
GEL TOPICAL
Qty: 45 G | Refills: 0 | Status: SHIPPED | OUTPATIENT
Start: 2021-01-11

## 2021-01-14 RX ORDER — ALBUTEROL SULFATE 90 UG/1
AEROSOL, METERED RESPIRATORY (INHALATION)
Qty: 8.5 INHALER | Refills: 0 | Status: SHIPPED | OUTPATIENT
Start: 2021-01-14 | End: 2021-07-13

## 2021-01-17 DIAGNOSIS — I10 ESSENTIAL HYPERTENSION: ICD-10-CM

## 2021-01-19 RX ORDER — AMLODIPINE BESYLATE 5 MG/1
TABLET ORAL
Qty: 30 TAB | Refills: 5 | Status: SHIPPED | OUTPATIENT
Start: 2021-01-19 | End: 2021-07-14 | Stop reason: SDUPTHER

## 2021-01-25 ENCOUNTER — APPOINTMENT (OUTPATIENT)
Dept: GENERAL RADIOLOGY | Age: 51
End: 2021-01-25
Attending: EMERGENCY MEDICINE
Payer: MEDICAID

## 2021-01-25 ENCOUNTER — HOSPITAL ENCOUNTER (EMERGENCY)
Age: 51
Discharge: HOME | End: 2021-01-25
Attending: EMERGENCY MEDICINE
Payer: MEDICAID

## 2021-01-25 VITALS
RESPIRATION RATE: 18 BRPM | OXYGEN SATURATION: 100 % | TEMPERATURE: 98.2 F | HEART RATE: 69 BPM | DIASTOLIC BLOOD PRESSURE: 76 MMHG | SYSTOLIC BLOOD PRESSURE: 116 MMHG

## 2021-01-25 DIAGNOSIS — R11.2 NON-INTRACTABLE VOMITING WITH NAUSEA, UNSPECIFIED VOMITING TYPE: ICD-10-CM

## 2021-01-25 DIAGNOSIS — Z20.822 SUSPECTED COVID-19 VIRUS INFECTION: Primary | ICD-10-CM

## 2021-01-25 LAB
ALBUMIN SERPL-MCNC: 3.7 G/DL (ref 3.4–5)
ALBUMIN/GLOB SERPL: 1.1 {RATIO} (ref 0.8–1.7)
ALP SERPL-CCNC: 103 U/L (ref 45–117)
ALT SERPL-CCNC: 26 U/L (ref 13–56)
ANION GAP SERPL CALC-SCNC: 6 MMOL/L (ref 3–18)
APPEARANCE UR: CLEAR
AST SERPL-CCNC: 14 U/L (ref 10–38)
BASOPHILS # BLD: 0.1 K/UL (ref 0–0.1)
BASOPHILS NFR BLD: 1 % (ref 0–2)
BILIRUB SERPL-MCNC: 0.5 MG/DL (ref 0.2–1)
BILIRUB UR QL: NEGATIVE
BUN SERPL-MCNC: 9 MG/DL (ref 7–18)
BUN/CREAT SERPL: 13 (ref 12–20)
CALCIUM SERPL-MCNC: 9.1 MG/DL (ref 8.5–10.1)
CHLORIDE SERPL-SCNC: 108 MMOL/L (ref 100–111)
CK MB CFR SERPL CALC: NORMAL % (ref 0–4)
CK MB SERPL-MCNC: <1 NG/ML (ref 5–25)
CK SERPL-CCNC: 66 U/L (ref 26–192)
CO2 SERPL-SCNC: 27 MMOL/L (ref 21–32)
COLOR UR: YELLOW
CREAT SERPL-MCNC: 0.7 MG/DL (ref 0.6–1.3)
DIFFERENTIAL METHOD BLD: ABNORMAL
EOSINOPHIL # BLD: 0.2 K/UL (ref 0–0.4)
EOSINOPHIL NFR BLD: 4 % (ref 0–5)
ERYTHROCYTE [DISTWIDTH] IN BLOOD BY AUTOMATED COUNT: 13.7 % (ref 11.6–14.5)
FLUAV AG NPH QL IA: NEGATIVE
FLUBV AG NOSE QL IA: NEGATIVE
GLOBULIN SER CALC-MCNC: 3.5 G/DL (ref 2–4)
GLUCOSE SERPL-MCNC: 94 MG/DL (ref 74–99)
GLUCOSE UR STRIP.AUTO-MCNC: NEGATIVE MG/DL
HCT VFR BLD AUTO: 41.9 % (ref 35–45)
HGB BLD-MCNC: 13.8 G/DL (ref 12–16)
HGB UR QL STRIP: NEGATIVE
KETONES UR QL STRIP.AUTO: NEGATIVE MG/DL
LEUKOCYTE ESTERASE UR QL STRIP.AUTO: NEGATIVE
LIPASE SERPL-CCNC: 88 U/L (ref 73–393)
LYMPHOCYTES # BLD: 1.9 K/UL (ref 0.9–3.6)
LYMPHOCYTES NFR BLD: 34 % (ref 21–52)
MCH RBC QN AUTO: 30 PG (ref 24–34)
MCHC RBC AUTO-ENTMCNC: 32.9 G/DL (ref 31–37)
MCV RBC AUTO: 91.1 FL (ref 74–97)
MONOCYTES # BLD: 0.4 K/UL (ref 0.05–1.2)
MONOCYTES NFR BLD: 7 % (ref 3–10)
NEUTS SEG # BLD: 3.1 K/UL (ref 1.8–8)
NEUTS SEG NFR BLD: 54 % (ref 40–73)
NITRITE UR QL STRIP.AUTO: NEGATIVE
PH UR STRIP: 6.5 [PH] (ref 5–8)
PLATELET # BLD AUTO: 217 K/UL (ref 135–420)
PMV BLD AUTO: 12.1 FL (ref 9.2–11.8)
POTASSIUM SERPL-SCNC: 4.3 MMOL/L (ref 3.5–5.5)
PROT SERPL-MCNC: 7.2 G/DL (ref 6.4–8.2)
PROT UR STRIP-MCNC: NEGATIVE MG/DL
RBC # BLD AUTO: 4.6 M/UL (ref 4.2–5.3)
SARS-COV-2, COV2: NORMAL
SODIUM SERPL-SCNC: 141 MMOL/L (ref 136–145)
SP GR UR REFRACTOMETRY: 1.01 (ref 1–1.03)
TROPONIN I SERPL-MCNC: <0.02 NG/ML (ref 0–0.04)
UROBILINOGEN UR QL STRIP.AUTO: 0.2 EU/DL (ref 0.2–1)
WBC # BLD AUTO: 5.7 K/UL (ref 4.6–13.2)

## 2021-01-25 PROCEDURE — C9113 INJ PANTOPRAZOLE SODIUM, VIA: HCPCS | Performed by: EMERGENCY MEDICINE

## 2021-01-25 PROCEDURE — 87804 INFLUENZA ASSAY W/OPTIC: CPT

## 2021-01-25 PROCEDURE — 71045 X-RAY EXAM CHEST 1 VIEW: CPT

## 2021-01-25 PROCEDURE — 83690 ASSAY OF LIPASE: CPT

## 2021-01-25 PROCEDURE — 99284 EMERGENCY DEPT VISIT MOD MDM: CPT

## 2021-01-25 PROCEDURE — U0003 INFECTIOUS AGENT DETECTION BY NUCLEIC ACID (DNA OR RNA); SEVERE ACUTE RESPIRATORY SYNDROME CORONAVIRUS 2 (SARS-COV-2) (CORONAVIRUS DISEASE [COVID-19]), AMPLIFIED PROBE TECHNIQUE, MAKING USE OF HIGH THROUGHPUT TECHNOLOGIES AS DESCRIBED BY CMS-2020-01-R: HCPCS

## 2021-01-25 PROCEDURE — 93005 ELECTROCARDIOGRAM TRACING: CPT

## 2021-01-25 PROCEDURE — 85025 COMPLETE CBC W/AUTO DIFF WBC: CPT

## 2021-01-25 PROCEDURE — 80053 COMPREHEN METABOLIC PANEL: CPT

## 2021-01-25 PROCEDURE — 96361 HYDRATE IV INFUSION ADD-ON: CPT

## 2021-01-25 PROCEDURE — 96375 TX/PRO/DX INJ NEW DRUG ADDON: CPT

## 2021-01-25 PROCEDURE — 82553 CREATINE MB FRACTION: CPT

## 2021-01-25 PROCEDURE — 96374 THER/PROPH/DIAG INJ IV PUSH: CPT

## 2021-01-25 PROCEDURE — 74011250636 HC RX REV CODE- 250/636: Performed by: EMERGENCY MEDICINE

## 2021-01-25 PROCEDURE — 81003 URINALYSIS AUTO W/O SCOPE: CPT

## 2021-01-25 RX ORDER — METOCLOPRAMIDE HYDROCHLORIDE 5 MG/ML
10 INJECTION INTRAMUSCULAR; INTRAVENOUS ONCE
Status: COMPLETED | OUTPATIENT
Start: 2021-01-25 | End: 2021-01-25

## 2021-01-25 RX ORDER — ONDANSETRON 2 MG/ML
4 INJECTION INTRAMUSCULAR; INTRAVENOUS ONCE
Status: DISCONTINUED | OUTPATIENT
Start: 2021-01-25 | End: 2021-01-25

## 2021-01-25 RX ORDER — PANTOPRAZOLE SODIUM 40 MG/10ML
40 INJECTION, POWDER, LYOPHILIZED, FOR SOLUTION INTRAVENOUS
Status: COMPLETED | OUTPATIENT
Start: 2021-01-25 | End: 2021-01-25

## 2021-01-25 RX ORDER — DIPHENHYDRAMINE HYDROCHLORIDE 50 MG/ML
25 INJECTION, SOLUTION INTRAMUSCULAR; INTRAVENOUS
Status: COMPLETED | OUTPATIENT
Start: 2021-01-25 | End: 2021-01-25

## 2021-01-25 RX ORDER — PHENOL/SODIUM PHENOLATE
20 AEROSOL, SPRAY (ML) MUCOUS MEMBRANE DAILY
Qty: 14 TAB | Refills: 0 | Status: SHIPPED | OUTPATIENT
Start: 2021-01-25 | End: 2021-02-08

## 2021-01-25 RX ORDER — ONDANSETRON 4 MG/1
4 TABLET, ORALLY DISINTEGRATING ORAL
Qty: 9 TAB | Refills: 0 | Status: SHIPPED | OUTPATIENT
Start: 2021-01-25 | End: 2021-01-28

## 2021-01-25 RX ADMIN — DIPHENHYDRAMINE HYDROCHLORIDE 25 MG: 50 INJECTION, SOLUTION INTRAMUSCULAR; INTRAVENOUS at 12:07

## 2021-01-25 RX ADMIN — PANTOPRAZOLE SODIUM 40 MG: 40 INJECTION, POWDER, FOR SOLUTION INTRAVENOUS at 12:07

## 2021-01-25 RX ADMIN — METOCLOPRAMIDE 10 MG: 5 INJECTION, SOLUTION INTRAMUSCULAR; INTRAVENOUS at 12:07

## 2021-01-25 RX ADMIN — SODIUM CHLORIDE 1000 ML: 9 INJECTION, SOLUTION INTRAVENOUS at 12:07

## 2021-01-25 NOTE — ED PROVIDER NOTES
EMERGENCY DEPARTMENT HISTORY AND PHYSICAL EXAM    10:02 AM    Date: 1/25/2021  Patient Name: Lelo Groves    History of Presenting Illness     Chief Complaint   Patient presents with    Flu Like Symptoms    Vomiting Blood       History Provided By: Patient  Location/Duration/Severity/Modifying factors   Patient is a 60-year-old female with past medical history of hypertension, status post hysterectomy presents to the emergency department today with a chief complaint of flulike symptoms, body aches, headache, vomiting and epigastric pain. She reports symptoms have been present for about 3 days. The pain in her abdomen is poorly localized, located primarily in the epigastric region started a couple days ago. She noted that last night and this morning she had a little bit of bright red blood in her emesis. She has not noted any bloody or melanotic stools. Symptoms worsen with eating, improved when she is not eating. Also complains of generalized headache, intermittent in nature but reports it is moderate to severe this morning. Not taking any blood thinners. No history of GI bleeds in the past.  Reportedly anemic previously however has not had a check since her hysterectomy. She does not recall the name of her antihypertensive medication. She indicates she was exposed to someone with Covid at her workplace, and this occurred 2 days ago last time she saw this individual who is been taken out of work since then presumably. No fevers or chills but has felt hot and cold. PCP: Mari Moreno NP    Current Outpatient Medications   Medication Sig Dispense Refill    Omeprazole delayed release (PRILOSEC D/R) 20 mg tablet Take 1 Tab by mouth daily for 14 days. 14 Tab 0    ondansetron (Zofran ODT) 4 mg disintegrating tablet 1 Tab by SubLINGual route every eight (8) hours as needed for Nausea or Vomiting for up to 3 days.  9 Tab 0    amLODIPine (NORVASC) 5 mg tablet TAKE 1 TABLET BY MOUTH EVERY DAY 30 Tab 5    ProAir HFA 90 mcg/actuation inhaler TAKE 1 PUFF BY INHALATION EVERY SIX HOURS AS NEEDED FOR WHEEZING OR SHORTNESS OF BREATH. 8.5 Inhaler 0    adapalene (DIFFERIN) 0.1 % topical gel APPLY TO AFFECTED AREA NIGHTLY. USE SMALL AMOUNT AS DIRECTED 45 g 0    clindamycin-benzoyl Peroxide (DUAC) 1.2 %(1 % base) -5 % SR topical gel Apply  to affected area nightly. 1 Tube 0    ibuprofen (MOTRIN) 800 mg tablet Take 1 Tab by mouth every eight (8) hours as needed for Pain. Indications: Pain 30 Tab 0    ferrous sulfate (IRON) 325 mg (65 mg iron) EC tablet Take 1 Tab by mouth three (3) times daily (with meals). 80 Tab 3       Past History     Past Medical History:  Past Medical History:   Diagnosis Date    Essential hypertension 2019    Iron deficiency anemia due to chronic blood loss 2019    Low iron     Neuropraxia of right lower extremity 2019       Past Surgical History:  Past Surgical History:   Procedure Laterality Date    HX  SECTION      HX TOTAL LAPAROSCOPIC HYSTERECTOMY         Family History:  Family History   Problem Relation Age of Onset    Breast Cancer Mother         52's    Breast Cancer Sister         52's    No Known Problems Father        Social History:  Social History     Tobacco Use    Smoking status: Never Smoker    Smokeless tobacco: Never Used   Substance Use Topics    Alcohol use: Yes     Frequency: Monthly or less     Drinks per session: 3 or 4     Binge frequency: Less than monthly     Comment: occasional    Drug use: No       Allergies:  No Known Allergies    I reviewed and confirmed the above information with patient and updated as necessary. Review of Systems     Review of Systems   Constitutional: Positive for chills and fatigue. Negative for fever. HENT: Positive for ear pain (Right). Negative for congestion, rhinorrhea, sinus pressure and sneezing. Eyes: Negative for visual disturbance.    Respiratory: Negative for cough and shortness of breath. Cardiovascular: Negative for chest pain, palpitations and leg swelling. Gastrointestinal: Positive for abdominal pain, nausea and vomiting. Negative for diarrhea. Genitourinary: Negative for difficulty urinating, dysuria, frequency and urgency. Musculoskeletal: Positive for myalgias. Negative for back pain, neck pain and neck stiffness. Skin: Negative for rash. Neurological: Positive for headaches. Negative for syncope and numbness. Physical Exam     Visit Vitals  /76   Pulse 69   Temp 98.2 °F (36.8 °C)   Resp 18   LMP 05/04/2019   SpO2 100%       Physical Exam  Vitals signs and nursing note reviewed. Constitutional:       General: She is not in acute distress. Appearance: Normal appearance. She is normal weight. HENT:      Head: Normocephalic and atraumatic. Right Ear: Tympanic membrane and external ear normal. There is no impacted cerumen. Left Ear: Tympanic membrane and external ear normal. There is no impacted cerumen. Ears:      Comments: Right EAC is mildly erythematous, there is no sloughing or discharge. Nose: Nose normal.      Mouth/Throat:      Mouth: Mucous membranes are moist.      Pharynx: Oropharynx is clear. No oropharyngeal exudate or posterior oropharyngeal erythema. Eyes:      Conjunctiva/sclera: Conjunctivae normal.      Pupils: Pupils are equal, round, and reactive to light. Neck:      Musculoskeletal: Normal range of motion and neck supple. Cardiovascular:      Rate and Rhythm: Normal rate and regular rhythm. Pulses: Normal pulses. Heart sounds: Normal heart sounds. No murmur. Pulmonary:      Effort: Pulmonary effort is normal.      Breath sounds: Normal breath sounds. No wheezing, rhonchi or rales. Abdominal:      General: Abdomen is flat. Palpations: Abdomen is soft. Tenderness: There is no abdominal tenderness. There is no guarding or rebound. Comments: Mild subjective epigastric tenderness. Musculoskeletal: Normal range of motion. General: No swelling or tenderness. Right lower leg: No edema. Left lower leg: No edema. Skin:     General: Skin is warm and dry. Capillary Refill: Capillary refill takes less than 2 seconds. Findings: No rash. Neurological:      General: No focal deficit present. Mental Status: She is alert. Diagnostic Study Results     Labs -  Recent Results (from the past 24 hour(s))   EKG, 12 LEAD, INITIAL    Collection Time: 01/25/21 11:33 AM   Result Value Ref Range    Ventricular Rate 48 BPM    Atrial Rate 48 BPM    P-R Interval 184 ms    QRS Duration 82 ms    Q-T Interval 450 ms    QTC Calculation (Bezet) 402 ms    Calculated P Axis 40 degrees    Calculated R Axis 49 degrees    Calculated T Axis 41 degrees    Diagnosis       Sinus bradycardia  Otherwise normal ECG  No previous ECGs available     CBC WITH AUTOMATED DIFF    Collection Time: 01/25/21 11:52 AM   Result Value Ref Range    WBC 5.7 4.6 - 13.2 K/uL    RBC 4.60 4.20 - 5.30 M/uL    HGB 13.8 12.0 - 16.0 g/dL    HCT 41.9 35.0 - 45.0 %    MCV 91.1 74.0 - 97.0 FL    MCH 30.0 24.0 - 34.0 PG    MCHC 32.9 31.0 - 37.0 g/dL    RDW 13.7 11.6 - 14.5 %    PLATELET 800 952 - 067 K/uL    MPV 12.1 (H) 9.2 - 11.8 FL    NEUTROPHILS 54 40 - 73 %    LYMPHOCYTES 34 21 - 52 %    MONOCYTES 7 3 - 10 %    EOSINOPHILS 4 0 - 5 %    BASOPHILS 1 0 - 2 %    ABS. NEUTROPHILS 3.1 1.8 - 8.0 K/UL    ABS. LYMPHOCYTES 1.9 0.9 - 3.6 K/UL    ABS. MONOCYTES 0.4 0.05 - 1.2 K/UL    ABS. EOSINOPHILS 0.2 0.0 - 0.4 K/UL    ABS.  BASOPHILS 0.1 0.0 - 0.1 K/UL    DF AUTOMATED     METABOLIC PANEL, COMPREHENSIVE    Collection Time: 01/25/21 11:52 AM   Result Value Ref Range    Sodium 141 136 - 145 mmol/L    Potassium 4.3 3.5 - 5.5 mmol/L    Chloride 108 100 - 111 mmol/L    CO2 27 21 - 32 mmol/L    Anion gap 6 3.0 - 18 mmol/L    Glucose 94 74 - 99 mg/dL    BUN 9 7.0 - 18 MG/DL    Creatinine 0.70 0.6 - 1.3 MG/DL    BUN/Creatinine ratio 13 12 - 20      GFR est AA >60 >60 ml/min/1.73m2    GFR est non-AA >60 >60 ml/min/1.73m2    Calcium 9.1 8.5 - 10.1 MG/DL    Bilirubin, total 0.5 0.2 - 1.0 MG/DL    ALT (SGPT) 26 13 - 56 U/L    AST (SGOT) 14 10 - 38 U/L    Alk. phosphatase 103 45 - 117 U/L    Protein, total 7.2 6.4 - 8.2 g/dL    Albumin 3.7 3.4 - 5.0 g/dL    Globulin 3.5 2.0 - 4.0 g/dL    A-G Ratio 1.1 0.8 - 1.7     LIPASE    Collection Time: 01/25/21 11:52 AM   Result Value Ref Range    Lipase 88 73 - 393 U/L   CARDIAC PANEL,(CK, CKMB & TROPONIN)    Collection Time: 01/25/21 11:52 AM   Result Value Ref Range    CK - MB <1.0 <3.6 ng/ml    CK-MB Index  0.0 - 4.0 %     CALCULATION NOT PERFORMED WHEN RESULT IS BELOW LINEAR LIMIT    CK 66 26 - 192 U/L    Troponin-I, QT <0.02 0.0 - 0.045 NG/ML   SARS-COV-2    Collection Time: 01/25/21 12:13 PM   Result Value Ref Range    SARS-CoV-2 Please find results under separate order     URINALYSIS W/ RFLX MICROSCOPIC    Collection Time: 01/25/21  2:21 PM   Result Value Ref Range    Color YELLOW      Appearance CLEAR      Specific gravity 1.011 1.005 - 1.030      pH (UA) 6.5 5.0 - 8.0      Protein Negative NEG mg/dL    Glucose Negative NEG mg/dL    Ketone Negative NEG mg/dL    Bilirubin Negative NEG      Blood Negative NEG      Urobilinogen 0.2 0.2 - 1.0 EU/dL    Nitrites Negative NEG      Leukocyte Esterase Negative NEG     INFLUENZA A & B AG (RAPID TEST)    Collection Time: 01/25/21  2:35 PM   Result Value Ref Range    Influenza A Antigen Negative NEG      Influenza B Antigen Negative NEG           Radiologic Studies -   XR CHEST PORT   Final Result      No acute radiographic cardiopulmonary abnormality. Medical Decision Making   I am the first provider for this patient. I reviewed the vital signs, available nursing notes, past medical history, past surgical history, family history and social history. Vital Signs-Reviewed the patient's vital signs.     EKG: Sinus bradycardia, rate 48bpm. Normal OR, QRS and QTc intervals. Normal axis. No ST segment elevation or depression. Overall normal sinus rhythm and normal EKG. Records Reviewed: Nursing Notes and Old Medical Records (Time of Review: 10:02 AM)    ED Course: Progress Notes, Reevaluation, and Consults:         Provider Notes (Medical Decision Making):   MDM  Number of Diagnoses or Management Options  Non-intractable vomiting with nausea, unspecified vomiting type  Suspected COVID-19 virus infection  Diagnosis management comments: 40-year-old female presenting with flulike symptoms, a few episodes of bright red blood in her emesis but none in her stool. On exam patient well-appearing, mild subjective tenderness elicited. Differential for the patient includes COVID-19, influenza, gastritis, other viral gastro enteritis or gastritis, GI bleed, Chanel-Danielson tear, peptic ulcer disease, UTI, pyelonephritis, lower suspicion for intra-abdominal process given patient constellation of symptoms. Patient's headache is not suggestive of serious process such as meningitis, subarachnoid hemorrhage, subdural hematoma, no history of trauma. Plan will be to obtain laboratory studies, will treat the patient symptomatically for now, if her laboratory studies are not reassuring or if she does not have relief of her symptoms we will need to do some imaging of her abdomen most likely. We will also test the patient for Covid and influenza. Vital signs have been reviewed, they are stable and normal with mild bradycardia. No suggestion of hemorrhagic shock. His labs have been reviewed. Hemoglobin is considerably higher than it was previously. Not demonstrating any signs of ongoing bleeding. No abdominal tenderness or suggestion of intra-abdominal process. Suspect Chanel-Danielson or gastritis for the cause of her episodic bleeding. Suspect likely underlying COVID-19 infection as she seemed to have very a viral picture.   We will discharge her home for isolation until the Covid test results. We will give her prescription for her symptoms as well. This was discussed at length with the patient advised to return if symptoms are worsening or changing in the meantime. At this time, patient is stable and appropriate for discharge home.  Patient demonstrates understanding of current diagnoses and is in agreement with the treatment plan. Ahn Amos are advised that while the likelihood of serious underlying condition is low at this point given the evaluation performed today, we cannot fully rule it out. Ahn Roosevelt are advised to immediately return with any new symptoms or worsening of current condition.  All questions have been answered. Lewis Delgado is given educational material regarding their diagnoses, including danger symptoms and when to return to the ED. This note was dictated utilizing Dragon voice recognition software. Unfortunately this leads to occasional typographical errors. I apologize in advance if the situation occurs. If questions occur please do not hesitate to contact me directly. Gilberto Vazquez, DO          Procedures    Critical Care Time: 0    Diagnosis     Clinical Impression:   1. Suspected COVID-19 virus infection    2. Non-intractable vomiting with nausea, unspecified vomiting type        Disposition: Discharge    Follow-up Information     Follow up With Specialties Details Why Contact Info    Roddy Osborne NP Nurse Practitioner In 2 days  [address] [address] [id number] [phone]      [facility] EMERGENCY DEPT Emergency Medicine  As needed, If symptoms worsen [address]  [phone]         Discharge Medication List as of 1/25/2021  3:34 PM      START taking these medications    Details   Omeprazole delayed release (PRILOSEC D/R) 20 mg tablet Take 1 Tab by mouth daily for 14 days. , Normal, Disp-14 Tab, R-0      ondansetron (Zofran ODT) 4 mg disintegrating tablet 1 Tab by SubLINGual route every eight (8) hours as needed for Nausea or Vomiting for up to 3 days. , Normal, Disp-9 Tab, R-0         CONTINUE these medications which have NOT CHANGED    Details   amLODIPine (NORVASC) 5 mg tablet TAKE 1 TABLET BY MOUTH EVERY DAY, Normal, Disp-30 Tab, R-5      ProAir HFA 90 mcg/actuation inhaler TAKE 1 PUFF BY INHALATION EVERY SIX HOURS AS NEEDED FOR WHEEZING OR SHORTNESS OF BREATH., Normal, Disp-8.5 Inhaler, R-0      adapalene (DIFFERIN) 0.1 % topical gel APPLY TO AFFECTED AREA NIGHTLY. USE SMALL AMOUNT AS DIRECTED, Normal, Disp-45 g, R-0      clindamycin-benzoyl Peroxide (DUAC) 1.2 %(1 % base) -5 % SR topical gel Apply  to affected area nightly., Normal, Disp-1 Tube,R-0      ibuprofen (MOTRIN) 800 mg tablet Take 1 Tab by mouth every eight (8) hours as needed for Pain. Indications: Pain, Normal, Disp-30 Tab, R-0      ferrous sulfate (IRON) 325 mg (65 mg iron) EC tablet Take 1 Tab by mouth three (3) times daily (with meals). , Normal, Disp-90 Tab, R-3             May  DO   Emergency Medicine   January 26, 2021, 10:02 AM     This note is dictated utilizing Dragon voice recognition software. Unfortunately this leads to occasional typographical errors using the voice recognition. I apologize in advance if the situation occurs. If questions occur please do not hesitate to contact me directly.     May Connolly, DO

## 2021-01-25 NOTE — Clinical Note
[facility] EMERGENCY DEPT 
[address] 
[address] [facility] [address] [phone] Work/School Note Date: 1/25/2021 To Whom It May concern: 
 
Mehran Connell was evaulated by the following provider(s): 
Attending Provider: Ed Connell virus is suspected. Per the CDC guidelines we recommend home isolation until the following conditions are all met: 1. At least 10 days have passed since symptoms first appeared and 2. At least 24 hours have passed since last fever without the use of fever-reducing medications and 
3. Symptoms (e.g., cough, shortness of breath) have improved Sincerely, 
 
 
 
 
Wyonia Done, DO

## 2021-01-25 NOTE — ED NOTES
I have reviewed discharge instructions with the patient. The patient verbalized understanding. Covid swab with label sent to lab.

## 2021-01-26 ENCOUNTER — PATIENT OUTREACH (OUTPATIENT)
Dept: CASE MANAGEMENT | Age: 51
End: 2021-01-26

## 2021-01-26 LAB
ATRIAL RATE: 48 BPM
CALCULATED P AXIS, ECG09: 40 DEGREES
CALCULATED R AXIS, ECG10: 49 DEGREES
CALCULATED T AXIS, ECG11: 41 DEGREES
DIAGNOSIS, 93000: NORMAL
P-R INTERVAL, ECG05: 184 MS
Q-T INTERVAL, ECG07: 450 MS
QRS DURATION, ECG06: 82 MS
QTC CALCULATION (BEZET), ECG08: 402 MS
SARS-COV-2, COV2NT: NOT DETECTED
VENTRICULAR RATE, ECG03: 48 BPM

## 2021-01-26 NOTE — PROGRESS NOTES
Patient contacted regarding Topher Lim. Discussed COVID-19 related testing which was available at this time. Test results were pending. Patient informed of results, if available? n/a     Care Transition Nurse/ Ambulatory Care Manager contacted the patient by telephone to perform post discharge assessment. Call within 2 business days of discharge: Yes Verified name and  with patient as identifiers. Provided introduction to self, and explanation of the CTN/ACM role, and reason for call due to risk factors for infection and/or exposure to COVID-19. Symptoms reviewed with patient who verbalized the following symptoms: pain or aching joints, chills or shaking and headache      Due to no new or worsening symptoms encounter was not routed to provider for escalation. Discussed follow-up appointments. If no appointment was previously scheduled, appointment scheduling offered:  Patient has follow up scheduled with PCP tomorrow. [facility] follow up appointment(s):   Future Appointments   Date Time Provider Mendoza Pathak   2021  3:45 PM Starla Haque, NP DMAM BS [facility]   2021 12:45 PM [facility] KEE STEREO BX RM 1 DMCMAM [facility]     Non-BS follow up appointment(s): n/a     Advance Care Planning:   Does patient have an Advance Directive:  not on file. Patient has following risk factors of: viral symptoms. CTN/ACM reviewed discharge instructions, medical action plan and red flags such as increased shortness of breath, increasing fever and signs of decompensation with patient who verbalized understanding. Discussed exposure protocols and quarantine with CDC Guidelines What to do if you are sick with coronavirus disease .  Patient was given an opportunity for questions and concerns. The patient agrees to contact the [facility] exposure line [phone], [facility] [address]  ([phone] and PCP office for questions related to their healthcare.  CTN/ACM provided contact information for future needs. Reviewed and educated patient on any new and changed medications related to discharge diagnosis     Patient/family/caregiver given information for GetWell Loop and agrees to enroll yes  Patient's preferred e-mail: [email or web address]   Patient's preferred phone number: [phone]  Based on Loop alert triggers, patient will be contacted by nurse care manager for worsening symptoms. Pt will be further monitored by COVID Loop Team based on severity of symptoms and risk factors.

## 2021-01-27 ENCOUNTER — VIRTUAL VISIT (OUTPATIENT)
Dept: FAMILY MEDICINE CLINIC | Age: 51
End: 2021-01-27
Payer: MEDICAID

## 2021-01-27 DIAGNOSIS — H66.91 RIGHT OTITIS MEDIA, UNSPECIFIED OTITIS MEDIA TYPE: Primary | ICD-10-CM

## 2021-01-27 PROCEDURE — 99213 OFFICE O/P EST LOW 20 MIN: CPT | Performed by: NURSE PRACTITIONER

## 2021-01-27 RX ORDER — AMOXICILLIN 875 MG/1
875 TABLET, FILM COATED ORAL 2 TIMES DAILY
Qty: 20 TAB | Refills: 0 | Status: SHIPPED | OUTPATIENT
Start: 2021-01-27 | End: 2021-02-06

## 2021-01-27 NOTE — LETTER
1/27/2021 4:44 PM 
 
Ms. Andrew Domingo [address] [facility] [address] [id number] [place] [address] January 27, 2021 RE: Andrew Domingo To Whom It May Concern, This is to certify that Andrew Domingo may may return to work on 1/30/21 without restriction. Please excuse from work 1/25/21 to 1/29/21. Please feel free to contact my office if you have any questions or concerns. Thank you for your assistance in this matter.  
 
 
Sincerely, 
Nora Bear NP

## 2021-01-27 NOTE — PROGRESS NOTES
Hiram Wakefield was seen by synchronous (real-time) audio-video technology DOXY on 1/27/2021. Consent: Hiram Wakefield, who was seen by synchronous (real-time) audio-video technology, and/or her healthcare decision maker, is aware that this patient-initiated, Telehealth encounter on 1/27/2021 is a billable service, with coverage as determined by her insurance carrier. She is aware that she may receive a bill and has provided verbal consent to proceed: yes  712  Subjective:     HPI:  Hiram Wakefield is a 48 y.o. female who was seen for the following:       Started feeling sick about 5 days ago. She had nausea, vomiting, abdominal pain, body aches, sweating. Last vomited yesterday. Tolerating fluids today. Abdominal pain is less than before. Had a little diarrhea today. Still has a headache and right ear pain. In the ER told her ear drum was a little red. Reports mild nasal congestion and  lightheadedness. Denies shortness of breath and cough. Denies any more bloody vomitus since the first day when she had a little bright red blood. Review of Systems   Constitutional: Positive for diaphoresis. Negative for appetite change, chills, fatigue and fever. HENT: Positive for congestion and ear pain. Negative for ear discharge, hearing loss, postnasal drip, rhinorrhea, sinus pressure, sinus pain, sneezing, sore throat and trouble swallowing. Eyes: Negative for discharge, redness and visual disturbance. Respiratory: Negative for cough, chest tightness, shortness of breath and wheezing. Cardiovascular: Negative for chest pain. Gastrointestinal: Positive for abdominal pain, diarrhea, nausea and vomiting. Negative for blood in stool. Genitourinary: Negative for decreased urine volume. Musculoskeletal: Negative for myalgias, neck pain and neck stiffness. Skin: Negative for rash. Allergic/Immunologic: Negative for environmental allergies.    Neurological: Positive for light-headedness and headaches. Negative for dizziness. Past Medical History, Past Surgery History, Allergies, Social History, and Family History were reviewed and updated. 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     Past Medical History:   Diagnosis Date    Essential hypertension 2019    Iron deficiency anemia due to chronic blood loss 2019    Low iron     Neuropraxia of right lower extremity 2019     Patient Care Team:  Pk Jimenez NP as PCP - General (Nurse Practitioner)  Pk Jimenez NP as PCP - [facility] Provider  Prema Platt as Ambulatory Care Manager    Past Surgical History:   Procedure Laterality Date    HX  SECTION      HX TOTAL LAPAROSCOPIC HYSTERECTOMY       Family History   Problem Relation Age of Onset    Breast Cancer Mother         52's    Breast Cancer Sister         52's    No Known Problems Father      Social History     Tobacco Use    Smoking status: Never Smoker    Smokeless tobacco: Never Used   Substance Use Topics    Alcohol use: Yes     Frequency: Monthly or less     Drinks per session: 3 or 4     Binge frequency: Less than monthly     Comment: occasional    Drug use: No     No Known Allergies  Current Outpatient Medications on File Prior to Visit   Medication Sig Dispense Refill    Omeprazole delayed release (PRILOSEC D/R) 20 mg tablet Take 1 Tab by mouth daily for 14 days. 14 Tab 0    amLODIPine (NORVASC) 5 mg tablet TAKE 1 TABLET BY MOUTH EVERY DAY 30 Tab 5    ProAir HFA 90 mcg/actuation inhaler TAKE 1 PUFF BY INHALATION EVERY SIX HOURS AS NEEDED FOR WHEEZING OR SHORTNESS OF BREATH. 8.5 Inhaler 0    adapalene (DIFFERIN) 0.1 % topical gel APPLY TO AFFECTED AREA NIGHTLY.  USE SMALL AMOUNT AS DIRECTED 45 g 0    clindamycin-benzoyl Peroxide (DUAC) 1.2 %(1 % base) -5 % SR topical gel Apply  to affected area nightly. 1 Tube 0    ibuprofen (MOTRIN) 800 mg tablet Take 1 Tab by mouth every eight (8) hours as needed for Pain. Indications: Pain 30 Tab 0    ferrous sulfate (IRON) 325 mg (65 mg iron) EC tablet Take 1 Tab by mouth three (3) times daily (with meals). 90 Tab 3     No current facility-administered medications on file prior to visit. Health Maintenance Due   Topic Date Due    Shingrix Vaccine Age 49> (1 of 2) 07/13/2020    Colorectal Cancer Screening Combo  07/13/2020    Flu Vaccine (1) 09/01/2020         Objective     PHYSICAL EXAMINATION:    Vital Signs: (As obtained by patient/caregiver at home)   No flowsheet data found. General: Alert, cooperative, no distress   Mental  status: Normal mood, behavior, speech, dress, motor activity, and thought processes, able to follow commands   HENT: Normocephalic, atraumatic   Neck: No visualized mass   Resp: No respiratory distress   Neuro: No gross deficits   Skin: No discoloration or lesions of concern on visible areas   Psychiatric: Normal affect, consistent with stated mood, no evidence of hallucinations     Additional exam findings: none      LABS     TESTS      Assessment and Plan     1. Right otitis media, unspecified otitis media type  GI symptoms are improving. Advised to hydrate well and advance diet as tolerated. Will treat for otitis media. Abt as directed. - amoxicillin (AMOXIL) 875 mg tablet; Take 1 Tab by mouth two (2) times a day for 10 days. Dispense: 20 Tab; Refill: 0      Follow-up and Dispositions    · Return if symptoms worsen or fail to improve. 712  We discussed the expected course, resolution and complications of the diagnosis(es) in detail. Medication risks, benefits, costs, interactions, and alternatives were discussed as indicated. I advised her to contact the office if her condition worsens, changes or fails to improve as anticipated.  She expressed understanding with the diagnosis(es) and plan. Mariangel Aceves is a 48 y.o. female who was evaluated by a video visit encounter for concerns as above. Patient identification was verified prior to start of the visit. A caregiver was present when appropriate. Due to this being a TeleHealth encounter (During XFWUN-43 public health emergency), evaluation of the following organ systems was limited: Vitals/Constitutional/EENT/Resp/CV/GI//MS/Neuro/Skin/Heme-Lymph-Imm. Pursuant to the emergency declaration under the [facility], [facility]5 waiver authority and the [employer] Act, this Virtual  Visit was conducted, with patient's (and/or legal guardian's) consent, to reduce the patient's risk of exposure to COVID-19 and provide necessary medical care. Services were provided through a video synchronous discussion virtually to substitute for in-person clinic visit. Patient was located at home and provider was located at home.       Signed electronically by Claudia Morley DNP, JONNY

## 2021-07-13 DIAGNOSIS — R06.2 WHEEZING: ICD-10-CM

## 2021-07-13 RX ORDER — ALBUTEROL SULFATE 90 UG/1
AEROSOL, METERED RESPIRATORY (INHALATION)
Qty: 8.5 INHALER | Refills: 0 | Status: SHIPPED | OUTPATIENT
Start: 2021-07-13

## 2021-07-14 ENCOUNTER — OFFICE VISIT (OUTPATIENT)
Dept: FAMILY MEDICINE CLINIC | Age: 51
End: 2021-07-14
Payer: MEDICAID

## 2021-07-14 VITALS
RESPIRATION RATE: 16 BRPM | TEMPERATURE: 97.1 F | OXYGEN SATURATION: 98 % | SYSTOLIC BLOOD PRESSURE: 145 MMHG | HEART RATE: 71 BPM | BODY MASS INDEX: 31.4 KG/M2 | DIASTOLIC BLOOD PRESSURE: 88 MMHG | HEIGHT: 63 IN | WEIGHT: 177.2 LBS

## 2021-07-14 DIAGNOSIS — Z13.1 SCREENING FOR DIABETES MELLITUS: ICD-10-CM

## 2021-07-14 DIAGNOSIS — I10 ESSENTIAL HYPERTENSION: ICD-10-CM

## 2021-07-14 DIAGNOSIS — R60.0 LOWER EXTREMITY EDEMA: ICD-10-CM

## 2021-07-14 DIAGNOSIS — Z13.29 SCREENING FOR THYROID DISORDER: ICD-10-CM

## 2021-07-14 DIAGNOSIS — R53.83 FATIGUE, UNSPECIFIED TYPE: ICD-10-CM

## 2021-07-14 DIAGNOSIS — Z00.00 PREVENTATIVE HEALTH CARE: ICD-10-CM

## 2021-07-14 DIAGNOSIS — Z13.220 SCREENING FOR HYPERLIPIDEMIA: ICD-10-CM

## 2021-07-14 DIAGNOSIS — Z12.11 SCREEN FOR COLON CANCER: Primary | ICD-10-CM

## 2021-07-14 PROCEDURE — 99213 OFFICE O/P EST LOW 20 MIN: CPT | Performed by: NURSE PRACTITIONER

## 2021-07-14 RX ORDER — HYDROCHLOROTHIAZIDE 12.5 MG/1
12.5 TABLET ORAL DAILY
Qty: 90 TABLET | Refills: 1 | Status: SHIPPED | OUTPATIENT
Start: 2021-07-14 | End: 2022-01-10

## 2021-07-14 RX ORDER — AMLODIPINE BESYLATE 5 MG/1
TABLET ORAL
Qty: 30 TABLET | Refills: 5 | Status: SHIPPED | OUTPATIENT
Start: 2021-07-14 | End: 2022-01-20 | Stop reason: SDUPTHER

## 2021-07-14 RX ORDER — AMLODIPINE BESYLATE 5 MG/1
TABLET ORAL
Qty: 30 TABLET | Refills: 5 | Status: CANCELLED | OUTPATIENT
Start: 2021-07-14

## 2021-07-14 NOTE — PROGRESS NOTES
OFFICE NOTE    Mary Zaragoza is a 46 y.o. female presenting today for office visit. 7/14/2021  3:17 PM    Chief Complaint   Patient presents with    Immunization/Injection    Medication Refill     HPI:   In office visit today. Patient c/o of bruises that just come, no trauma. Ordered routine blood work. Total hysterectomy 2019. Patient reports appetite is good, no urinary issues, sleeps well and regular bowel movements. Patient denies fever, chills, chest pain, shortness of breath, abdomen pain or dark tarry stool. covid vaccine, pfizer completed. 7/22/2021 patient has a mammogram.   Due for first colonoscopy, ordered. Patient reports appetite is good, no urinary issues, sleeps well and regular bowel movements. Patient denies fever, chills, chest pain, shortness of breath, abdomen pain or dark tarry stool. ROS:    · General: negative for - chills, fever, weight changes or malaise  · HEENT: no sore throat, nasal congestion, vision problems or ear problems  · Respiratory: no cough, shortness of breath, or wheezing  · Cardiovascular: no chest pain, palpitations, or dyspnea on exertion  · Gastrointestinal: no abdominal pain, N/V, change in bowel habits, or black or bloody stools  · Musculoskeletal: no back pain, joint pain, joint stiffness, muscle pain or muscle weakness  · Neurological: no numbness, tingling, headache or dizziness  · Endo:  No polyuria or polydipsia. · : no hematuria, dysuria, frequency, hesitancy, or nocturia.     · Skin: No itching or rash, no open skin, no unusual lesions   · Psychological: negative for - anxiety, depression, sleep disturbances, suicidal or homicidal ideations     PHQ Screening   3 most recent PHQ Screens 7/14/2021   Little interest or pleasure in doing things Not at all   Feeling down, depressed, irritable, or hopeless Not at all   Total Score PHQ 2 0     History  Past Medical History:   Diagnosis Date    Essential hypertension 5/21/2019    Iron deficiency anemia due to chronic blood loss 2019    Low iron     Neuropraxia of right lower extremity 2019     Past Surgical History:   Procedure Laterality Date    HX  SECTION      HX TOTAL LAPAROSCOPIC HYSTERECTOMY       Social History     Socioeconomic History    Marital status: SINGLE     Spouse name: Not on file    Number of children: Not on file    Years of education: Not on file    Highest education level: Not on file   Occupational History    Not on file   Tobacco Use    Smoking status: Never Smoker    Smokeless tobacco: Never Used   Substance and Sexual Activity    Alcohol use: Yes     Comment: occasional    Drug use: No    Sexual activity: Not Currently     Partners: Male     Birth control/protection: None   Other Topics Concern    Not on file   Social History Narrative    Not on file     Social Determinants of Health     Financial Resource Strain:     Difficulty of Paying Living Expenses:    Food Insecurity:     Worried About Running Out of Food in the Last Year:     Ran Out of Food in the Last Year:    Transportation Needs:     Lack of Transportation (Medical):      Lack of Transportation (Non-Medical):    Physical Activity:     Days of Exercise per Week:     Minutes of Exercise per Session:    Stress:     Feeling of Stress :    Social Connections:     Frequency of Communication with Friends and Family:     Frequency of Social Gatherings with Friends and Family:     Attends [employer] Services:     Active Member of Clubs or Organizations:     Attends Club or Organization Meetings:     Marital Status:    Intimate Partner Violence:     Fear of Current or Ex-Partner:     Emotionally Abused:     Physically Abused:     Sexually Abused:      No Known Allergies    Current Outpatient Medications   Medication Sig Dispense Refill    amLODIPine (NORVASC) 5 mg tablet TAKE 1 TABLET BY MOUTH EVERY DAY  Indications: high blood pressure 30 Tablet 5    hydroCHLOROthiazide (HYDRODIURIL) 12.5 mg tablet Take 1 Tablet by mouth daily for 180 days. 90 Tablet 1    ProAir HFA 90 mcg/actuation inhaler TAKE 1 PUFF BY INHALATION EVERY SIX HOURS AS NEEDED FOR WHEEZING OR SHORTNESS OF BREATH. 8.5 Inhaler 0    adapalene (DIFFERIN) 0.1 % topical gel APPLY TO AFFECTED AREA NIGHTLY. USE SMALL AMOUNT AS DIRECTED 45 g 0    clindamycin-benzoyl Peroxide (DUAC) 1.2 %(1 % base) -5 % SR topical gel Apply  to affected area nightly. 1 Tube 0    ibuprofen (MOTRIN) 800 mg tablet Take 1 Tab by mouth every eight (8) hours as needed for Pain. Indications: Pain 30 Tab 0    ferrous sulfate (IRON) 325 mg (65 mg iron) EC tablet Take 1 Tab by mouth three (3) times daily (with meals). 90 Tab 3     Patient Care Team:  Patient Care Team:  Collette Pao, NP as PCP - General (Nurse Practitioner)    LABS:  None new to review    RADIOLOGY:  None new to review    Physical Exam  The patient appears well, she is pleasant, alert, oriented x 3, in no distress. Obese. ENT normal.  Neck supple. No adenopathy or thyromegaly. LEONIE. Lungs are clear, good air entry, no wheezes, rhonchi or rales. Cardiovascular, S1 and S2 normal, no murmurs, regular rate and rhythm. No LAD. Chest wall negative for tenderness  Abdomen is soft without tenderness  Muscleskeletal, no swelling, no tenderness, no injury. Extremities show no edema  Neurological is normal without focal findings. Skin: no concerning lesions. Psych: normal affect. Mood good. Oriented x 3. Judgement and insight intact.       Vitals:    07/14/21 1418   BP: (!) 145/88   Pulse: 71   Resp: 16   Temp: 97.1 °F (36.2 °C)   TempSrc: Temporal   SpO2: 98%   Weight: 177 lb 3.2 oz (80.4 kg)   Height: 5' 3\" (1.6 m)   PainSc:   0 - No pain   LMP: 05/04/2019     Assessment and Plan    Diagnoses and all orders for this visit:    Screen for colon cancer  -     REFERRAL TO GASTROENTEROLOGY    Essential hypertension  -     amLODIPine (NORVASC) 5 mg tablet; TAKE 1 TABLET BY MOUTH EVERY DAY  Indications: high blood pressure, Normal, Disp-30 Tablet, R-5  -     METABOLIC PANEL, COMPREHENSIVE; Future  -     hydroCHLOROthiazide (HYDRODIURIL) 12.5 mg tablet; Take 1 Tablet by mouth daily for 180 days. , Normal, Disp-90 Tablet, R-1    Screening for hyperlipidemia  -     LIPID PANEL; Future    Screening for thyroid disorder  -     T4, FREE; Future  -     TSH 3RD GENERATION; Future    Preventative health care  -     CBC WITH AUTOMATED DIFF; Future  -     URINALYSIS W/ RFLX MICROSCOPIC; Future    Screening for diabetes mellitus  -     HEMOGLOBIN A1C WITH EAG; Future    Fatigue, unspecified type  -     VITAMIN D, 25 HYDROXY; Future    Lower extremity edema  -     hydroCHLOROthiazide (HYDRODIURIL) 12.5 mg tablet; Take 1 Tablet by mouth daily for 180 days. , Normal, Disp-90 Tablet, R-1    Other orders  -     Cancel: amLODIPine (NORVASC) 5 mg tablet; Normal, Disp-30 Tablet, R-5         *Plan of care reviewed with patient. Patient in agreement with plan and expresses understanding. All questions answered and patient encouraged to call or RTO if further questions or concerns. 50% of minutes spent on counseling and managing her care.         Marcin De La Cruz, LEYDA-C

## 2021-07-29 ENCOUNTER — VIRTUAL VISIT (OUTPATIENT)
Dept: FAMILY MEDICINE CLINIC | Age: 51
End: 2021-07-29
Payer: MEDICAID

## 2021-07-29 DIAGNOSIS — R05.9 COUGH: ICD-10-CM

## 2021-07-29 DIAGNOSIS — J40 BRONCHITIS: Primary | ICD-10-CM

## 2021-07-29 PROCEDURE — 99212 OFFICE O/P EST SF 10 MIN: CPT | Performed by: NURSE PRACTITIONER

## 2021-07-29 RX ORDER — AZITHROMYCIN 250 MG/1
TABLET, FILM COATED ORAL
Qty: 6 TABLET | Refills: 0 | Status: SHIPPED | OUTPATIENT
Start: 2021-07-29 | End: 2021-08-03

## 2021-07-29 RX ORDER — BENZONATATE 200 MG/1
200 CAPSULE ORAL
Qty: 21 CAPSULE | Refills: 0 | Status: SHIPPED | OUTPATIENT
Start: 2021-07-29 | End: 2021-08-05

## 2021-07-29 NOTE — PROGRESS NOTES
OFFICE NOTE    Oralia Levy is a 46 y.o. female presenting today for office visit. Oralia Levy, who was evaluated through a synchronous (real-time) audio-video encounter, and/or her healthcare decision maker, is aware that it is a billable service, with coverage as determined by her insurance carrier. She provided verbal consent to proceed: Yes, and patient identification was verified. This visit was conducted pursuant to the emergency declaration under the [facility], [address] [place] [address] and the [employer] Act. A caregiver was present when appropriate. Ability to conduct physical exam was limited. The patient was located in a state where the provider was credentialed to provide care. --Jane Go NP on 7/29/2021 at 4:37 PM    7/29/2021  4:35 PM    Chief Complaint   Patient presents with    Cough     Little yellow to green sputum    Fever       HPI:   Virtual visit today related to cough at night, sore throat and a fever since 7/22/2021. Patient is in a car and someone else is driving. She has coughed up a little bit of yellow and green phlegm. She says she has had her Covid vaccine. She says there is a lot of people at her job but has not had the Covid vaccine and did not wear a mask anymore. Patient says she does not smoke. She says she has had bronchitis in the past but not a bunch of times. Patient says she does not have a history of asthma or breathing problems. She says she has been tested several times in the past for Covid but was negative. Patient has labs pending from July 14, 2021. Patient says she will get them done tomorrow. She will make an appointment to come in the office for physical exam and lab review. Patient reports appetite is good, no urinary issues, sleeps well and regular bowel movements.   Patient denies fever, chills, chest pain, shortness of breath, abdomen pain or dark tarry stool. ROS:    · General: positive for - chills, fever, negative - weight changes or malaise  · HEENT: no sore throat, nasal congestion, vision problems or ear problems  · Respiratory: positive -  cough, negative - shortness of breath, or wheezing  · Cardiovascular: no chest pain, palpitations, or dyspnea on exertion  · Gastrointestinal: no abdominal pain, N/V, change in bowel habits, or black or bloody stools  · Musculoskeletal: no back pain, joint pain, joint stiffness, muscle pain or muscle weakness  · Neurological: no numbness, tingling, headache or dizziness  · Endo:  No polyuria or polydipsia. · : no hematuria, dysuria, frequency, hesitancy, or nocturia.     · Skin: No itching or rash, no open skin, no unusual lesions   · Psychological: negative for - anxiety, depression, sleep disturbances, suicidal or homicidal ideations     PHQ Screening   3 most recent PHQ Screens 2021   Little interest or pleasure in doing things Not at all   Feeling down, depressed, irritable, or hopeless Not at all   Total Score PHQ 2 0       History  Past Medical History:   Diagnosis Date    Essential hypertension 2019    Iron deficiency anemia due to chronic blood loss 2019    Low iron     Neuropraxia of right lower extremity 2019       Past Surgical History:   Procedure Laterality Date    HX  SECTION      HX TOTAL LAPAROSCOPIC HYSTERECTOMY         Social History     Socioeconomic History    Marital status: SINGLE     Spouse name: Not on file    Number of children: Not on file    Years of education: Not on file    Highest education level: Not on file   Occupational History    Not on file   Tobacco Use    Smoking status: Never Smoker    Smokeless tobacco: Never Used   Substance and Sexual Activity    Alcohol use: Yes     Comment: occasional    Drug use: No    Sexual activity: Not Currently     Partners: Male     Birth control/protection: None   Other Topics Concern    Not on file   Social History Narrative    Not on file     Social Determinants of Health     Financial Resource Strain:     Difficulty of Paying Living Expenses:    Food Insecurity:     Worried About Running Out of Food in the Last Year:     [address] in the Last Year:    Transportation Needs:     Lack of Transportation (Medical):  Lack of Transportation (Non-Medical):    Physical Activity:     Days of Exercise per Week:     Minutes of Exercise per Session:    Stress:     Feeling of Stress :    Social Connections:     Frequency of Communication with Friends and Family:     Frequency of Social Gatherings with Friends and Family:     Attends Sabian[employer] Services:     Active Member of Clubs or Organizations:     Attends Club or Organization Meetings:     Marital Status:    Intimate Partner Violence:     Fear of Current or Ex-Partner:     Emotionally Abused:     Physically Abused:     Sexually Abused:        No Known Allergies    Current Outpatient Medications   Medication Sig Dispense Refill    azithromycin (ZITHROMAX) 250 mg tablet Take 2 tabs today then 1 tab daily for 4 days 6 Tablet 0    benzonatate (TESSALON) 200 mg capsule Take 1 Capsule by mouth three (3) times daily as needed for Cough for up to 7 days. Indications: cough 21 Capsule 0    amLODIPine (NORVASC) 5 mg tablet TAKE 1 TABLET BY MOUTH EVERY DAY  Indications: high blood pressure 30 Tablet 5    hydroCHLOROthiazide (HYDRODIURIL) 12.5 mg tablet Take 1 Tablet by mouth daily for 180 days. 90 Tablet 1    ProAir HFA 90 mcg/actuation inhaler TAKE 1 PUFF BY INHALATION EVERY SIX HOURS AS NEEDED FOR WHEEZING OR SHORTNESS OF BREATH. 8.5 Inhaler 0    adapalene (DIFFERIN) 0.1 % topical gel APPLY TO AFFECTED AREA NIGHTLY. USE SMALL AMOUNT AS DIRECTED 45 g 0    clindamycin-benzoyl Peroxide (DUAC) 1.2 %(1 % base) -5 % SR topical gel Apply  to affected area nightly.  1 Tube 0    ibuprofen (MOTRIN) 800 mg tablet Take 1 Tab by mouth every eight (8) hours as needed for Pain. Indications: Pain 30 Tab 0    ferrous sulfate (IRON) 325 mg (65 mg iron) EC tablet Take 1 Tab by mouth three (3) times daily (with meals). 90 Tab 3       Patient Care Team:  Patient Care Team:  Michelle Lock NP as PCP - General (Nurse Practitioner)  Michelle Lock NP as PCP - [facility] Empaneled Provider    LABS:  None new to review    RADIOLOGY:  None new to review    Physical Exam virtual visit  The patient appears well, is pleasant, alert, oriented x 3, in no distress. Eyes, no discharge. Normal nose. Head, normocephalic and atraumatic. Nose appears normal.  Neck, no visible neck mass. Lungs, appears to have normal respiratory effort     Skin, no jaundice and patient does not appear pale. Psych: normal affect. Mood good. Oriented x 3. Judgement and insight intact. There were no vitals filed for this visit. Assessment and Plan    Diagnoses and all orders for this visit:    Bronchitis  -     azithromycin (ZITHROMAX) 250 mg tablet; Take 2 tabs today then 1 tab daily for 4 days, Normal, Disp-6 Tablet, R-0    Cough  -     benzonatate (TESSALON) 200 mg capsule; Take 1 Capsule by mouth three (3) times daily as needed for Cough for up to 7 days. Indications: cough, Normal, Disp-21 Capsule, R-0         *Plan of care reviewed with patient. Patient in agreement with plan and expresses understanding. All questions answered and patient encouraged to call or RTO if further questions or concerns. 50% of 15 minutes spent on counseling and managing her care.     DMITRY Mcpherson

## 2021-09-15 ENCOUNTER — TELEPHONE (OUTPATIENT)
Dept: FAMILY MEDICINE CLINIC | Age: 51
End: 2021-09-15

## 2021-09-15 NOTE — TELEPHONE ENCOUNTER
States that she was at the [facility] on 8/1/2021. States she got some IV, and an inhaler. Was told that CXR was negative, and had a negative COVID test. Pt is concerned because her cough has not subsided or improved. Today patient was sent home from work due to coughing to much. I have advised pt that if her cough differs from the \"normal\" cough that she has been having for over 2months or feels unwell she is to go to the ED. I told her I would be sending you this message, and she hopes to either get an appointment or have you call her.

## 2021-09-16 DIAGNOSIS — R05.9 COUGH: Primary | ICD-10-CM

## 2021-09-16 RX ORDER — BENZONATATE 200 MG/1
200 CAPSULE ORAL
Qty: 21 CAPSULE | Refills: 0 | Status: SHIPPED | OUTPATIENT
Start: 2021-09-16 | End: 2021-09-23

## 2021-09-17 ENCOUNTER — VIRTUAL VISIT (OUTPATIENT)
Dept: FAMILY MEDICINE CLINIC | Age: 51
End: 2021-09-17
Payer: MEDICAID

## 2021-09-17 DIAGNOSIS — J02.9 SORE THROAT: ICD-10-CM

## 2021-09-17 DIAGNOSIS — R05.9 COUGH: ICD-10-CM

## 2021-09-17 DIAGNOSIS — J40 BRONCHITIS: Primary | ICD-10-CM

## 2021-09-17 DIAGNOSIS — R50.9 FEVER, UNSPECIFIED FEVER CAUSE: ICD-10-CM

## 2021-09-17 PROCEDURE — 99212 OFFICE O/P EST SF 10 MIN: CPT | Performed by: NURSE PRACTITIONER

## 2021-09-17 RX ORDER — AZITHROMYCIN 250 MG/1
TABLET, FILM COATED ORAL
Qty: 6 TABLET | Refills: 0 | Status: SHIPPED | OUTPATIENT
Start: 2021-09-17 | End: 2021-09-22

## 2021-09-17 NOTE — LETTER
NOTIFICATION RETURN TO WORK / SCHOOL    9/17/2021 9:32 AM    Ms. Kady Salmeron  [address] [place] [address]      To Whom It May Concern:    Kady Salmeron is currently under the care of [facility] Tracy Bejarano. She will return to work/school on: 9/20/2021     If there are questions or concerns please have the patient contact our office.         Sincerely,      Gian Dan NP

## 2021-09-17 NOTE — PROGRESS NOTES
OFFICE NOTE    Purvis Brittle is a 46 y.o. female presenting today for office visit. Purvis Brittle, who was evaluated through a synchronous (real-time) audio-video encounter, and/or her healthcare decision maker, is aware that it is a billable service, with coverage as determined by her insurance carrier. She provided verbal consent to proceed: Yes, and patient identification was verified. This visit was conducted pursuant to the emergency declaration under the [address] [place] [address], [address] [place] [address] and the [employer] General Act. A caregiver was present when appropriate. Ability to conduct physical exam was limited. The patient was located in a state where the provider was credentialed to provide care. --Baron Alessio NP on 9/17/2021 at 8:15 AM    9/17/2021  8:14 AM    Chief Complaint   Patient presents with    Cough     Patient states she feels like she has had a cough since 1 August 2021    Sore Throat     She has a sore throat feels scratchy and raw    Fever     Fever last night with sweats       HPI:   Patient's had a cough with some chills for the last several days. Patient presented to ED 8/6/2021 chest x-ray was normal when she was given albuterol. Patient's been coughing at work and sent home due to coughing. Patient I spoke last night 9/16/2021 not able to get her a appointment at [facility] for rapid Covid test.  Patient indicated she would try to get a test at [facility] pharmacy. Today September 17, 2021, patient reports cough is a little better she had a fever with cold sweats last night. She says with her cough the last couple days she has had some green discharge. She says she was exposed to a family member that had Covid. Patient states she got her first Martinez Peter vaccine March 2021 and her second vaccine April 21, 2021.   Patient has an appointment with [facility] tomorrow September 18, 2021 at 3 PM for rapid Covid test.  Due to patient's fever and increased sputum we will try a course of azithromycin. Patient will continue to take her Tessalon Perles use her albuterol for shortness of breath and gargle with warm salt water 3 times a day. Patient will send me a message related to her Covid results. Patient has no other concerns today. Patient reports appetite is poor, no urinary issues, sleeps well and regular bowel movements. Patient denies chest pain, abdomen pain or dark tarry stool. Covid vaccine, completed    ROS:    · General: negative for + chills, fever, negative for weight changes  · HEENT: +sore throat, negative for nasal congestion, negative for vision problems or ear problems  · Respiratory: + cough, shortness of breath, or negative wheezing  · Cardiovascular: no chest pain, palpitations, or dyspnea on exertion  · Gastrointestinal: no abdominal pain, N/V, change in bowel habits, or black or bloody stools  · Musculoskeletal: no back pain, joint pain, joint stiffness, muscle pain or muscle weakness  · Neurological: no numbness, tingling, headache or dizziness  · Endo:  No polyuria or polydipsia. · : no hematuria, dysuria, frequency, hesitancy, or nocturia.     · Skin: No itching or rash, no open skin, no unusual lesions   · Psychological: negative for - anxiety, depression, sleep disturbances, suicidal or homicidal ideations     PHQ Screening   3 most recent PHQ Screens 2021   Little interest or pleasure in doing things Not at all   Feeling down, depressed, irritable, or hopeless Not at all   Total Score PHQ 2 0       History  Past Medical History:   Diagnosis Date    Essential hypertension 2019    Iron deficiency anemia due to chronic blood loss 2019    Low iron     Neuropraxia of right lower extremity 2019       Past Surgical History:   Procedure Laterality Date    HX  SECTION      HX TOTAL LAPAROSCOPIC HYSTERECTOMY         Social History     Socioeconomic History    Marital status: SINGLE     Spouse name: Not on file    Number of children: Not on file    Years of education: Not on file    Highest education level: Not on file   Occupational History    Not on file   Tobacco Use    Smoking status: Never Smoker    Smokeless tobacco: Never Used   Substance and Sexual Activity    Alcohol use: Yes     Comment: occasional    Drug use: No    Sexual activity: Not Currently     Partners: Male     Birth control/protection: None   Other Topics Concern    Not on file   Social History Narrative    Not on file     Social Determinants of Health     Financial Resource Strain:     Difficulty of Paying Living Expenses:    Food Insecurity:     Worried About Running Out of Food in the Last Year:     [address] in the Last Year:    Transportation Needs:     Lack of Transportation (Medical):  Lack of Transportation (Non-Medical):    Physical Activity:     Days of Exercise per Week:     Minutes of Exercise per Session:    Stress:     Feeling of Stress :    Social Connections:     Frequency of Communication with Friends and Family:     Frequency of Social Gatherings with Friends and Family:     Attends [employer] Services:     Active Member of Clubs or Organizations:     Attends Club or Organization Meetings:     Marital Status:    Intimate Partner Violence:     Fear of Current or Ex-Partner:     Emotionally Abused:     Physically Abused:     Sexually Abused:        No Known Allergies    Current Outpatient Medications   Medication Sig Dispense Refill    azithromycin (ZITHROMAX) 250 mg tablet Take 2 tabs today then 1 tab daily for 4 days 6 Tablet 0    benzonatate (TESSALON) 200 mg capsule Take 1 Capsule by mouth three (3) times daily as needed for Cough for up to 7 days.  21 Capsule 0    amLODIPine (NORVASC) 5 mg tablet TAKE 1 TABLET BY MOUTH EVERY DAY  Indications: high blood pressure 30 Tablet 5    hydroCHLOROthiazide (HYDRODIURIL) 12.5 mg tablet Take 1 Tablet by mouth daily for 180 days. 90 Tablet 1    ProAir HFA 90 mcg/actuation inhaler TAKE 1 PUFF BY INHALATION EVERY SIX HOURS AS NEEDED FOR WHEEZING OR SHORTNESS OF BREATH. 8.5 Inhaler 0    adapalene (DIFFERIN) 0.1 % topical gel APPLY TO AFFECTED AREA NIGHTLY. USE SMALL AMOUNT AS DIRECTED 45 g 0    clindamycin-benzoyl Peroxide (DUAC) 1.2 %(1 % base) -5 % SR topical gel Apply  to affected area nightly. 1 Tube 0    ibuprofen (MOTRIN) 800 mg tablet Take 1 Tab by mouth every eight (8) hours as needed for Pain. Indications: Pain 30 Tab 0    ferrous sulfate (IRON) 325 mg (65 mg iron) EC tablet Take 1 Tab by mouth three (3) times daily (with meals). 90 Tab 3       Patient Care Team:  Patient Care Team:  Yulia Fritz NP as PCP - General (Nurse Practitioner)  Yulia Fritz NP as PCP - [facility] Empaneled Provider    LABS:  None new to review    RADIOLOGY:  None new to review    Physical Exam  The patient appears ill but not toxic, is pleasant, alert, oriented x 3, in no distress. She had no coughing during our video visit. Eyes, no discharge. Normal nose. Head, normocephalic and atraumatic. Nose appears normal.  Neck, no visible neck mass. Lungs, appears to have normal respiratory effort     Skin, no jaundice and patient does not appear pale. Psych: normal affect. Mood good. Oriented x 3. Judgement and insight intact. There were no vitals filed for this visit. Assessment and Plan    Diagnoses and all orders for this visit:    Bronchitis  -     azithromycin (ZITHROMAX) 250 mg tablet; Take 2 tabs today then 1 tab daily for 4 days, Normal, Disp-6 Tablet, R-0    Sore throat    Cough    Fever, unspecified fever cause    *Plan of care reviewed with patient. Patient in agreement with plan and expresses understanding. All questions answered and patient encouraged to call or RTO if further questions or concerns. 50% of 15 minutes spent on counseling and managing her care.     Follow-up and Dispositions · Return in about 1 week (around 9/24/2021), or if symptoms worsen or fail to improve.        Armando Draek, NP-C

## 2021-10-24 ENCOUNTER — TELEPHONE (OUTPATIENT)
Dept: FAMILY MEDICINE CLINIC | Age: 51
End: 2021-10-24

## 2021-10-24 NOTE — TELEPHONE ENCOUNTER
Ms. Sarah Mcmillan called, evening 10/22/2022 reporting tooth pain, suspected erupting wisdom tooth. Indicated that she had called her PCPs office twice today with no response. Requesting antibiotics. Patient was advised that with only the information provided it is not entirely clear that antibiotics would be appropriate. Recommended urgent care evaluation to determine appropriate intervention.

## 2021-10-25 DIAGNOSIS — K04.7 DENTAL ABSCESS: Primary | ICD-10-CM

## 2021-10-25 RX ORDER — AMOXICILLIN AND CLAVULANATE POTASSIUM 875; 125 MG/1; MG/1
1 TABLET, FILM COATED ORAL 2 TIMES DAILY
Qty: 20 TABLET | Refills: 0 | Status: SHIPPED | OUTPATIENT
Start: 2021-10-25 | End: 2021-11-04

## 2022-01-13 ENCOUNTER — VIRTUAL VISIT (OUTPATIENT)
Dept: FAMILY MEDICINE CLINIC | Age: 52
End: 2022-01-13
Payer: MEDICAID

## 2022-01-13 DIAGNOSIS — R05.9 COUGH: ICD-10-CM

## 2022-01-13 DIAGNOSIS — J06.9 UPPER RESPIRATORY TRACT INFECTION, UNSPECIFIED TYPE: Primary | ICD-10-CM

## 2022-01-13 DIAGNOSIS — R50.9 FEVER, UNSPECIFIED FEVER CAUSE: ICD-10-CM

## 2022-01-13 PROCEDURE — 99212 OFFICE O/P EST SF 10 MIN: CPT | Performed by: NURSE PRACTITIONER

## 2022-01-13 NOTE — LETTER
NOTIFICATION RETURN TO WORK / SCHOOL    1/13/2022 9:11 AM    Ms. Mandeep Hernandez  [address] [place] [address]      To Whom It May Concern:    Mandeep Hernandez is currently under the care of [address] [place] [address] D[place]. She will return to work/school on: 1/17/2022    If there are questions or concerns please have the patient contact our office.         Sincerely,      Ngoc Mathew NP

## 2022-01-13 NOTE — PROGRESS NOTES
OFFICE NOTE    Martín Nichols is a 46 y.o. female presenting today for office visit. Martín Nichols, who was evaluated through a synchronous (real-time) audio-video encounter, and/or her healthcare decision maker, is aware that it is a billable service, with coverage as determined by her insurance carrier. She provided verbal consent to proceed: Yes, and patient identification was verified. This visit was conducted pursuant to the emergency declaration under the [address] [place] [address], [address] [place] [address] authority and the [employer] Act. A caregiver was present when appropriate. Ability to conduct physical exam was limited. The patient was located in a state where the provider was credentialed to provide care. --Regina Pierce NP on 1/13/2022 at 9:13 AM    1/13/2022  9:02 AM    Chief Complaint   Patient presents with    Fever    Cough     Since 1/5/2022, COVID test PCR negative       HPI:   ED follow-up 1/10/2021 suspected. Symptoms started 1/5/2022 with feeling cold and hot, fever, chills and sore throat. Covid test PCR NEGATIVE. She has a little  She has had right chest pain, worse with movement. Chest x-ray was normal. She eat some wings and had chest pain. She Nexium with good results. Patient reports appetite is good, no urinary issues, sleeps well and regular bowel movements. Patient denies fever, chills, chest pain, shortness of breath, abdomen pain or dark tarry stool.     Covid vaccine done and needs booster  Flu vaccine done     ROS:    · General: positive for chills, fever   · HEENT: Positive for sore throat, negative for nasal congestion, vision problems or ear problems  · Respiratory: Slight cough, intermittent shortness of breath, negative for wheezing  · Cardiovascular: no chest pain, palpitations, or dyspnea on exertion  · Gastrointestinal: no abdominal pain, N/V, change in bowel habits, or black or bloody stools  · Musculoskeletal: no back pain, joint pain, joint stiffness, muscle pain or muscle weakness  · Neurological: no numbness, tingling, headache or dizziness  · Endo:  No polyuria or polydipsia. · : no hematuria, dysuria, frequency, hesitancy, or nocturia. · Skin: No itching or rash, no open skin, no unusual lesions   · Psychological: negative for - anxiety, depression, sleep disturbances, suicidal or homicidal ideations     History  Past Medical History:   Diagnosis Date    Essential hypertension 2019    Iron deficiency anemia due to chronic blood loss 2019    Low iron     Neuropraxia of right lower extremity 2019       Past Surgical History:   Procedure Laterality Date    HX  SECTION      HX TOTAL LAPAROSCOPIC HYSTERECTOMY         Social History     Socioeconomic History    Marital status: SINGLE     Spouse name: Not on file    Number of children: Not on file    Years of education: Not on file    Highest education level: Not on file   Occupational History    Not on file   Tobacco Use    Smoking status: Never Smoker    Smokeless tobacco: Never Used   Substance and Sexual Activity    Alcohol use: Yes     Comment: occasional    Drug use: No    Sexual activity: Not Currently     Partners: Male     Birth control/protection: None   Other Topics Concern    Not on file   Social History Narrative    Not on file     Social Determinants of Health     Financial Resource Strain:     Difficulty of Paying Living Expenses: Not on file   Food Insecurity:     Worried About Running Out of Food in the Last Year: Not on file    Heather of Food in the Last Year: Not on file   Transportation Needs:     Lack of Transportation (Medical): Not on file    Lack of Transportation (Non-Medical):  Not on file   Physical Activity:     Days of Exercise per Week: Not on file    Minutes of Exercise per Session: Not on file   Stress:     Feeling of Stress : Not on file   Social Connections:  Frequency of Communication with Friends and Family: Not on file    Frequency of Social Gatherings with Friends and Family: Not on file    Attends [employer] Services: Not on file    Active Member of Clubs or Organizations: Not on file    Attends Club or Organization Meetings: Not on file    Marital Status: Not on file   Intimate Partner Violence:     Fear of Current or Ex-Partner: Not on file    Emotionally Abused: Not on file    Physically Abused: Not on file    Sexually Abused: Not on file   Housing Stability:     Unable to Pay for Housing in the Last Year: Not on file    Number of Places Lived in the Last Year: Not on file    Unstable Housing in the Last Year: Not on file       No Known Allergies    Current Outpatient Medications   Medication Sig Dispense Refill    amLODIPine (NORVASC) 5 mg tablet TAKE 1 TABLET BY MOUTH EVERY DAY  Indications: high blood pressure 30 Tablet 5    ProAir HFA 90 mcg/actuation inhaler TAKE 1 PUFF BY INHALATION EVERY SIX HOURS AS NEEDED FOR WHEEZING OR SHORTNESS OF BREATH. 8.5 Inhaler 0    adapalene (DIFFERIN) 0.1 % topical gel APPLY TO AFFECTED AREA NIGHTLY. USE SMALL AMOUNT AS DIRECTED 45 g 0    clindamycin-benzoyl Peroxide (DUAC) 1.2 %(1 % base) -5 % SR topical gel Apply  to affected area nightly. 1 Tube 0    ibuprofen (MOTRIN) 800 mg tablet Take 1 Tab by mouth every eight (8) hours as needed for Pain. Indications: Pain 30 Tab 0    ferrous sulfate (IRON) 325 mg (65 mg iron) EC tablet Take 1 Tab by mouth three (3) times daily (with meals). 90 Tab 3       Patient Care Team:  Patient Care Team:  Veryl Hard, NP as PCP - General (Nurse Practitioner)  Veryl Hard, NP as PCP - [facility] Empaneled Provider    Physical Exam  The patient appears slightly ill, is pleasant, alert, oriented x 3, in no distress. Eyes, no discharge. Normal nose. Head, normocephalic and atraumatic. Nose appears normal.  Neck, no visible neck mass.    Lungs, appears to have normal respiratory effort     Skin, no jaundice and patient does not appear pale. Psych: normal affect. Mood good. Oriented x 3. Judgement and insight intact. There were no vitals filed for this visit. Assessment and Plan    Diagnoses and all orders for this visit:    Upper respiratory tract infection, unspecified type    Fever, unspecified fever cause    Cough         *Plan of care reviewed with patient. Patient in agreement with plan and expresses understanding. All questions answered and patient encouraged to call or RTO if further questions or concerns. 50% of 15 minutes spent on counseling and managing her care. Follow-up and Dispositions    · Return if symptoms worsen or fail to improve, for in offcie next week.        DMITRY Mclaughlin

## 2022-01-20 ENCOUNTER — OFFICE VISIT (OUTPATIENT)
Dept: FAMILY MEDICINE CLINIC | Age: 52
End: 2022-01-20
Payer: MEDICAID

## 2022-01-20 VITALS
HEART RATE: 63 BPM | RESPIRATION RATE: 16 BRPM | DIASTOLIC BLOOD PRESSURE: 81 MMHG | HEIGHT: 63 IN | BODY MASS INDEX: 30.69 KG/M2 | OXYGEN SATURATION: 98 % | WEIGHT: 173.2 LBS | SYSTOLIC BLOOD PRESSURE: 132 MMHG | TEMPERATURE: 98.1 F

## 2022-01-20 DIAGNOSIS — N95.9 POST MENOPAUSAL PROBLEMS: Primary | ICD-10-CM

## 2022-01-20 DIAGNOSIS — B00.1 FEVER BLISTER: ICD-10-CM

## 2022-01-20 DIAGNOSIS — R23.2 HOT FLASHES: ICD-10-CM

## 2022-01-20 DIAGNOSIS — I10 ESSENTIAL HYPERTENSION: ICD-10-CM

## 2022-01-20 PROCEDURE — 99213 OFFICE O/P EST LOW 20 MIN: CPT | Performed by: NURSE PRACTITIONER

## 2022-01-20 RX ORDER — VALACYCLOVIR HYDROCHLORIDE 1 G/1
500 TABLET, FILM COATED ORAL DAILY
Qty: 60 TABLET | Refills: 1 | Status: SHIPPED | OUTPATIENT
Start: 2022-01-20

## 2022-01-20 RX ORDER — VALACYCLOVIR HYDROCHLORIDE 1 G/1
500 TABLET, FILM COATED ORAL DAILY
COMMUNITY
End: 2022-01-20 | Stop reason: SDUPTHER

## 2022-01-20 RX ORDER — HYDROCHLOROTHIAZIDE 12.5 MG/1
12.5 TABLET ORAL DAILY
Qty: 90 TABLET | Refills: 1 | Status: SHIPPED | OUTPATIENT
Start: 2022-01-20 | End: 2022-08-15 | Stop reason: SDUPTHER

## 2022-01-20 RX ORDER — VENLAFAXINE 37.5 MG/1
37.5 TABLET ORAL 3 TIMES DAILY
Qty: 30 TABLET | Refills: 0 | Status: SHIPPED | OUTPATIENT
Start: 2022-01-20 | End: 2022-07-15

## 2022-01-20 RX ORDER — AMLODIPINE BESYLATE 5 MG/1
TABLET ORAL
Qty: 30 TABLET | Refills: 5 | Status: SHIPPED | OUTPATIENT
Start: 2022-01-20

## 2022-01-20 NOTE — PROGRESS NOTES
Antolin Gandhi is a 46 y.o. female (: 1970) presenting to address:    Chief Complaint   Patient presents with    Follow-up    Chills       Vitals:    22 1110   BP: 132/81   Pulse: 63   Resp: 16   Temp: 98.1 °F (36.7 °C)   SpO2: 98%   Weight: 173 lb 3.2 oz (78.6 kg)   Height: 5' 3\" (1.6 m)   PainSc:   0 - No pain   LMP: 2019       Hearing/Vision:   No exam data present    Learning Assessment:     Learning Assessment 2019   PRIMARY LEARNER Patient   PRIMARY LANGUAGE ENGLISH   LEARNER PREFERENCE PRIMARY OTHER (COMMENT)   ANSWERED BY patient   RELATIONSHIP SELF     Depression Screening:     3 most recent PHQ Screens 2022   Little interest or pleasure in doing things Not at all   Feeling down, depressed, irritable, or hopeless Not at all   Total Score PHQ 2 0     Fall Risk Assessment:     Fall Risk Assessment, last 12 mths 4/3/2020   Able to walk? Yes   Fall in past 12 months? No     Abuse Screening:     Abuse Screening Questionnaire 4/3/2020   Do you ever feel afraid of your partner? N   Are you in a relationship with someone who physically or mentally threatens you? N   Is it safe for you to go home? Y     ADL Assessment:   No flowsheet data found. Coordination of Care Questionaire:   1. \"Have you been to the ER, urgent care clinic since your last visit? Hospitalized since your last visit? \" Yes When: 1 week ago at [facility] for chest pain and chills     2. \"Have you seen or consulted any other health care providers outside of the [address] [place] [address] since your last visit? \" No     3. For patients aged 39-70: Has the patient had a colonoscopy? No     If the patient is female:    4. For patients aged 41-77: Has the patient had a mammogram within the past 2 years? No    5. For patients aged 21-65: Has the patient had a pap smear? No    Advanced Directive:   1. Do you have an Advanced Directive? NO    2. Would you like information on Advanced Directives?  NO

## 2022-01-20 NOTE — PROGRESS NOTES
OFFICE NOTE    Lindsey Banks is a 46 y.o. female presenting today for office visit. 1/20/2022  11:27 AM    Chief Complaint   Patient presents with    Follow-up    Chills     hot flashes for 2 months       HPI:   Patient has started having hot flashes, mood change, insomina and body hair for 2 months. She has a history of hysterectomy 2019. She says her mother past away of breast cancer. She will discuss hormone therapy with her GYN provider Dr. Divina Farmer. Patient reports appetite is good, no urinary issues, sleeps well and regular bowel movements. Patient denies fever, chills, chest pain, shortness of breath, abdomen pain or dark tarry stool. Needs to schedule her mammogram.     ROS:    · General: negative for - chills, fever, weight changes or malaise  · HEENT: no sore throat, nasal congestion, vision problems or ear problems  · Respiratory: no cough, shortness of breath, or wheezing  · Cardiovascular: positive for hot flashes, no chest pain, palpitations, or dyspnea on exertion  · Gastrointestinal: no abdominal pain, N/V, change in bowel habits, or black or bloody stools  · Musculoskeletal: no back pain, joint pain, joint stiffness, muscle pain or muscle weakness  · Neurological: no numbness, tingling, headache or dizziness  · Endo:  No polyuria or polydipsia. · : no hematuria, dysuria, frequency, hesitancy, or nocturia.     · Skin: No itching or rash, no open skin, no unusual lesions   · Psychological: positive for moodiness and sleep disturbance, negative for - anxiety, suicidal or homicidal ideations     PHQ Screening   3 most recent PHQ Screens 1/20/2022   Little interest or pleasure in doing things Not at all   Feeling down, depressed, irritable, or hopeless Not at all   Total Score PHQ 2 0       History  Past Medical History:   Diagnosis Date    Essential hypertension 5/21/2019    Iron deficiency anemia due to chronic blood loss 5/21/2019    Low iron     Neuropraxia of right lower extremity 2019       Past Surgical History:   Procedure Laterality Date    HX  SECTION      HX TOTAL LAPAROSCOPIC HYSTERECTOMY         Social History     Socioeconomic History    Marital status: SINGLE     Spouse name: Not on file    Number of children: Not on file    Years of education: Not on file    Highest education level: Not on file   Occupational History    Not on file   Tobacco Use    Smoking status: Never Smoker    Smokeless tobacco: Never Used   Substance and Sexual Activity    Alcohol use: Yes     Comment: occasional    Drug use: No    Sexual activity: Not Currently     Partners: Male     Birth control/protection: None   Other Topics Concern    Not on file   Social History Narrative    Not on file     Social Determinants of Health     Financial Resource Strain:     Difficulty of Paying Living Expenses: Not on file   Food Insecurity:     Worried About Running Out of Food in the Last Year: Not on file    Heather of Food in the Last Year: Not on file   Transportation Needs:     Lack of Transportation (Medical): Not on file    Lack of Transportation (Non-Medical):  Not on file   Physical Activity:     Days of Exercise per Week: Not on file    Minutes of Exercise per Session: Not on file   Stress:     Feeling of Stress : Not on file   Social Connections:     Frequency of Communication with Friends and Family: Not on file    Frequency of Social Gatherings with Friends and Family: Not on file    Attends Gnosticist Services: Not on file    Active Member of Clubs or Organizations: Not on file    Attends Club or Organization Meetings: Not on file    Marital Status: Not on file   Intimate Partner Violence:     Fear of Current or Ex-Partner: Not on file    Emotionally Abused: Not on file    Physically Abused: Not on file    Sexually Abused: Not on file   Housing Stability:     Unable to Pay for Housing in the Last Year: Not on file    Number of [place] in the Last Year: Not on file    Unstable Housing in the Last Year: Not on file       No Known Allergies    Current Outpatient Medications   Medication Sig Dispense Refill    valACYclovir (VALTREX) 1 gram tablet Take 0.5 Tablets by mouth daily. Indications: shingles 60 Tablet 1    venlafaxine (EFFEXOR) 37.5 mg tablet Take 1 Tablet by mouth three (3) times daily. 30 Tablet 0    amLODIPine (NORVASC) 5 mg tablet TAKE 1 TABLET BY MOUTH EVERY DAY  Indications: high blood pressure 30 Tablet 5    hydroCHLOROthiazide (HYDRODIURIL) 12.5 mg tablet Take 1 Tablet by mouth daily. Indications: high blood pressure 90 Tablet 1    ProAir HFA 90 mcg/actuation inhaler TAKE 1 PUFF BY INHALATION EVERY SIX HOURS AS NEEDED FOR WHEEZING OR SHORTNESS OF BREATH. 8.5 Inhaler 0    adapalene (DIFFERIN) 0.1 % topical gel APPLY TO AFFECTED AREA NIGHTLY. USE SMALL AMOUNT AS DIRECTED 45 g 0    clindamycin-benzoyl Peroxide (DUAC) 1.2 %(1 % base) -5 % SR topical gel Apply  to affected area nightly. 1 Tube 0    ibuprofen (MOTRIN) 800 mg tablet Take 1 Tab by mouth every eight (8) hours as needed for Pain. Indications: Pain 30 Tab 0    ferrous sulfate (IRON) 325 mg (65 mg iron) EC tablet Take 1 Tab by mouth three (3) times daily (with meals). 90 Tab 3       Patient Care Team:  Patient Care Team:  Karina Bajwa NP as PCP - General (Nurse Practitioner)  Karina Bajwa NP as PCP - REHABILITATION [facility] Empaneled Provider    Physical Exam  Patient appears well, she is pleasant, alert, oriented x 3, in no distress. ENT normal.  Neck supple. No adenopathy or thyromegaly. LEONIE. Lungs are clear, good air entry, no wheezes, rhonchi or rales. Cardiovascular, S1 and S2 normal, no murmurs, regular rate and rhythm. No LAD. Chest wall negative for tenderness  Abdomen is soft without tenderness  /Anorectal, deferred. Muscleskeletal, no swelling  Extremities show no edema  Neurological is normal without focal findings. Skin: no concerning lesions.     Psych: normal affect. Mood good. Oriented x 3. Judgement and insight intact. Vitals:    01/20/22 1110   BP: 132/81   Pulse: 63   Resp: 16   Temp: 98.1 °F (36.7 °C)   SpO2: 98%   Weight: 173 lb 3.2 oz (78.6 kg)   Height: 5' 3\" (1.6 m)   PainSc:   0 - No pain   LMP: 05/04/2019       Assessment and Plan    Diagnoses and all orders for this visit:    Post menopausal problems    Hot flashes  -     venlafaxine (EFFEXOR) 37.5 mg tablet; Take 1 Tablet by mouth three (3) times daily. , Normal, Disp-30 Tablet, R-0    Fever blister  -     valACYclovir (VALTREX) 1 gram tablet; Take 0.5 Tablets by mouth daily. Indications: shingles, Normal, Disp-60 Tablet, R-1    Essential hypertension  -     amLODIPine (NORVASC) 5 mg tablet; TAKE 1 TABLET BY MOUTH EVERY DAY  Indications: high blood pressure, Normal, Disp-30 Tablet, R-5  -     hydroCHLOROthiazide (HYDRODIURIL) 12.5 mg tablet; Take 1 Tablet by mouth daily. Indications: high blood pressure, Normal, Disp-90 Tablet, R-1         *Plan of care reviewed with patient. Patient in agreement with plan and expresses understanding. All questions answered and patient encouraged to call or RTO if further questions or concerns. 50% of 25 minutes spent on counseling and managing her care. Follow-up and Dispositions    · Return in about 1 month (around 2/20/2022).        DMITRY Costa

## 2022-02-03 DIAGNOSIS — B37.31 VAGINAL YEAST INFECTION: Primary | ICD-10-CM

## 2022-02-03 RX ORDER — FLUCONAZOLE 150 MG/1
TABLET ORAL
Qty: 2 TABLET | Refills: 0 | Status: SHIPPED | OUTPATIENT
Start: 2022-02-03 | End: 2022-07-15

## 2022-03-18 PROBLEM — Z90.710 S/P HYSTERECTOMY: Status: ACTIVE | Noted: 2019-06-05

## 2022-03-19 PROBLEM — N92.1 MENOMETRORRHAGIA: Status: ACTIVE | Noted: 2019-05-21

## 2022-03-19 PROBLEM — I10 ESSENTIAL HYPERTENSION: Status: ACTIVE | Noted: 2019-05-21

## 2022-03-19 PROBLEM — D50.0 IRON DEFICIENCY ANEMIA DUE TO CHRONIC BLOOD LOSS: Status: ACTIVE | Noted: 2019-05-21

## 2022-03-20 PROBLEM — S84.91XA: Status: ACTIVE | Noted: 2019-06-07

## 2022-05-09 ENCOUNTER — VIRTUAL VISIT (OUTPATIENT)
Dept: FAMILY MEDICINE CLINIC | Age: 52
End: 2022-05-09
Payer: MEDICAID

## 2022-05-09 DIAGNOSIS — M25.472 LEFT ANKLE SWELLING: ICD-10-CM

## 2022-05-09 DIAGNOSIS — I10 ESSENTIAL HYPERTENSION: ICD-10-CM

## 2022-05-09 DIAGNOSIS — J01.10 ACUTE NON-RECURRENT FRONTAL SINUSITIS: Primary | ICD-10-CM

## 2022-05-09 PROCEDURE — 99213 OFFICE O/P EST LOW 20 MIN: CPT | Performed by: NURSE PRACTITIONER

## 2022-05-09 RX ORDER — AMOXICILLIN AND CLAVULANATE POTASSIUM 875; 125 MG/1; MG/1
1 TABLET, FILM COATED ORAL 2 TIMES DAILY
Qty: 20 TABLET | Refills: 0 | Status: SHIPPED | OUTPATIENT
Start: 2022-05-09 | End: 2022-05-19

## 2022-05-09 RX ORDER — FLUTICASONE PROPIONATE 50 MCG
2 SPRAY, SUSPENSION (ML) NASAL DAILY
Qty: 1 EACH | Refills: 2 | Status: SHIPPED | OUTPATIENT
Start: 2022-05-09

## 2022-05-09 NOTE — PROGRESS NOTES
OFFICE NOTE    Horacio Negro is a 46 y.o. female presenting today for office visit. 5/9/2022  12:40 PM    Chief Complaint   Patient presents with    Sinus Pain     HPI:   Virtual visit. She is at work. She feels she has pressure in her sinuses and a sore throat. She has green dischange for her nose. She denies a fever. Patient says she works at [employer]. She says after she gets off work she has left ankle swelling. She denies any traumatic injury to her ankle. She says in the morning her ankle is normal without swelling. Next visit must be face to face. Due for labs. She has a history of hysterectomy 2019    Patient reports appetite is good, no urinary issues, sleeps well and regular bowel movements. Patient denies fever, chills, chest pain, shortness of breath, abdomen pain or dark tarry stool. ROS:    · General: negative for - chills, fever, weight changes or malaise  · HEENT: positive for sore throat, sinus pressure and nasal congestion, egative for vision problems or ear problems  · Respiratory: no cough, shortness of breath, or wheezing  · Cardiovascular: no chest pain, palpitations, or dyspnea on exertion  · Gastrointestinal: no abdominal pain, N/V, change in bowel habits, or black or bloody stools  · Musculoskeletal: no back pain, joint pain, joint stiffness, muscle pain or muscle weakness  · Neurological: no numbness, tingling, headache or dizziness  · Endo:  No polyuria or polydipsia. · : no hematuria, dysuria, frequency, hesitancy, or nocturia.     · Skin: No itching or rash, no open skin, no unusual lesions   · Psychological: negative for - anxiety, depression, sleep disturbances, suicidal or homicidal ideations     PHQ Screening   3 most recent PHQ Screens 5/9/2022   Little interest or pleasure in doing things Not at all   Feeling down, depressed, irritable, or hopeless Not at all   Total Score PHQ 2 0       History  Past Medical History:   Diagnosis Date    Essential hypertension 2019    Iron deficiency anemia due to chronic blood loss 2019    Low iron     Neuropraxia of right lower extremity 2019       Past Surgical History:   Procedure Laterality Date    HX  SECTION      HX TOTAL LAPAROSCOPIC HYSTERECTOMY         Social History     Socioeconomic History    Marital status: SINGLE     Spouse name: Not on file    Number of children: Not on file    Years of education: Not on file    Highest education level: Not on file   Occupational History    Not on file   Tobacco Use    Smoking status: Never Smoker    Smokeless tobacco: Never Used   Substance and Sexual Activity    Alcohol use: Yes     Comment: occasional    Drug use: No    Sexual activity: Not Currently     Partners: Male     Birth control/protection: None   Other Topics Concern    Not on file   Social History Narrative    Not on file     Social Determinants of Health     Financial Resource Strain:     Difficulty of Paying Living Expenses: Not on file   Food Insecurity:     Worried About Running Out of Food in the Last Year: Not on file    Heather of Food in the Last Year: Not on file   Transportation Needs:     Lack of Transportation (Medical): Not on file    Lack of Transportation (Non-Medical):  Not on file   Physical Activity:     Days of Exercise per Week: Not on file    Minutes of Exercise per Session: Not on file   Stress:     Feeling of Stress : Not on file   Social Connections:     Frequency of Communication with Friends and Family: Not on file    Frequency of Social Gatherings with Friends and Family: Not on file    Attends Confucianism Services: Not on file    Active Member of Clubs or Organizations: Not on file    Attends Club or Organization Meetings: Not on file    Marital Status: Not on file   Intimate Partner Violence:     Fear of Current or Ex-Partner: Not on file    Emotionally Abused: Not on file    Physically Abused: Not on file   Eliane Prince Sexually Abused: Not on file   Housing Stability:     Unable to Pay for Housing in the Last Year: Not on file    Number of [place] in the Last Year: Not on file    Unstable Housing in the Last Year: Not on file       No Known Allergies    Current Outpatient Medications   Medication Sig Dispense Refill    amoxicillin-clavulanate (Augmentin) 875-125 mg per tablet Take 1 Tablet by mouth two (2) times a day for 10 days. 20 Tablet 0    fluticasone propionate (FLONASE) 50 mcg/actuation nasal spray 2 Sprays by Both Nostrils route daily. 1 Each 2    fluconazole (DIFLUCAN) 150 mg tablet FDA advises cautious prescribing of oral fluconazole in pregnancy. Take 1 tablet now and 1 tablet when you complete your antibiotics. 2 Tablet 0    valACYclovir (VALTREX) 1 gram tablet Take 0.5 Tablets by mouth daily. Indications: shingles 60 Tablet 1    venlafaxine (EFFEXOR) 37.5 mg tablet Take 1 Tablet by mouth three (3) times daily. 30 Tablet 0    amLODIPine (NORVASC) 5 mg tablet TAKE 1 TABLET BY MOUTH EVERY DAY  Indications: high blood pressure 30 Tablet 5    hydroCHLOROthiazide (HYDRODIURIL) 12.5 mg tablet Take 1 Tablet by mouth daily. Indications: high blood pressure 90 Tablet 1    ProAir HFA 90 mcg/actuation inhaler TAKE 1 PUFF BY INHALATION EVERY SIX HOURS AS NEEDED FOR WHEEZING OR SHORTNESS OF BREATH. 8.5 Inhaler 0    adapalene (DIFFERIN) 0.1 % topical gel APPLY TO AFFECTED AREA NIGHTLY. USE SMALL AMOUNT AS DIRECTED 45 g 0    clindamycin-benzoyl Peroxide (DUAC) 1.2 %(1 % base) -5 % SR topical gel Apply  to affected area nightly. 1 Tube 0    ibuprofen (MOTRIN) 800 mg tablet Take 1 Tab by mouth every eight (8) hours as needed for Pain. Indications: Pain 30 Tab 0    ferrous sulfate (IRON) 325 mg (65 mg iron) EC tablet Take 1 Tab by mouth three (3) times daily (with meals).  90 Tab 3       Patient Care Team:  Patient Care Team:  Андрей Latif NP as PCP - General (Nurse Practitioner)  Андрей Latif NP as PCP - [facility] Empaneled Provider      Physical Exam  The patient appears well, is pleasant, alert, oriented x 3, in no distress. Eyes, no discharge. Normal nose. Head, normocephalic and atraumatic. Nose appears normal.  Neck, no visible neck mass. Lungs, appears to have normal respiratory effort     Skin, no jaundice and patient does not appear pale. Psych: normal affect. Mood good. Oriented x 3. Judgement and insight intact. There were no vitals filed for this visit. Assessment and Plan    Diagnoses and all orders for this visit:    Acute non-recurrent frontal sinusitis  -     amoxicillin-clavulanate (Augmentin) 875-125 mg per tablet; Take 1 Tablet by mouth two (2) times a day for 10 days. , Normal, Disp-20 Tablet, R-0  -     fluticasone propionate (FLONASE) 50 mcg/actuation nasal spray; 2 Sprays by Both Nostrils route daily. , Normal, Disp-1 Each, R-2    Essential hypertension    Left ankle swelling         *Plan of care reviewed with patient. Patient in agreement with plan and expresses understanding. All questions answered and patient encouraged to call or RTO if further questions or concerns. 50% of 29 minutes spent on counseling and managing her care.       DMITRY Bradley

## 2022-06-16 ENCOUNTER — TELEPHONE (OUTPATIENT)
Dept: FAMILY MEDICINE CLINIC | Age: 52
End: 2022-06-16

## 2022-06-16 NOTE — TELEPHONE ENCOUNTER
----- Message from Suzi Bolden sent at 6/13/2022 11:53 AM EDT -----  Subject: Message to Provider    QUESTIONS  Information for Provider? Patient would like if office could call   medication in for cough and body aches she's been having for three days to   [id number] [place], [address]  ---------------------------------------------------------------------------  --------------  [address]  What is the best way for the office to contact you? OK to leave message on   voicemail  Preferred Call Back Phone Number?  [phone]  ---------------------------------------------------------------------------  --------------  SCRIPT ANSWERS  undefined

## 2022-07-15 ENCOUNTER — HOSPITAL ENCOUNTER (OUTPATIENT)
Dept: WOMENS IMAGING | Age: 52
End: 2022-07-15
Attending: NURSE PRACTITIONER
Payer: MEDICAID

## 2022-07-15 ENCOUNTER — HOSPITAL ENCOUNTER (OUTPATIENT)
Dept: WOMENS IMAGING | Age: 52
Discharge: HOME | End: 2022-07-15
Payer: MEDICAID

## 2022-07-15 ENCOUNTER — OFFICE VISIT (OUTPATIENT)
Dept: FAMILY MEDICINE CLINIC | Age: 52
End: 2022-07-15
Payer: MEDICAID

## 2022-07-15 VITALS
BODY MASS INDEX: 31.71 KG/M2 | DIASTOLIC BLOOD PRESSURE: 69 MMHG | SYSTOLIC BLOOD PRESSURE: 119 MMHG | HEART RATE: 58 BPM | TEMPERATURE: 97.9 F | HEIGHT: 63 IN | RESPIRATION RATE: 15 BRPM | WEIGHT: 179 LBS | OXYGEN SATURATION: 97 %

## 2022-07-15 DIAGNOSIS — Z13.1 SCREENING FOR DIABETES MELLITUS: ICD-10-CM

## 2022-07-15 DIAGNOSIS — I10 ESSENTIAL HYPERTENSION: ICD-10-CM

## 2022-07-15 DIAGNOSIS — Z13.29 SCREENING FOR THYROID DISORDER: ICD-10-CM

## 2022-07-15 DIAGNOSIS — F41.9 ANXIETY AND DEPRESSION: ICD-10-CM

## 2022-07-15 DIAGNOSIS — T14.8XXA BRUISING: ICD-10-CM

## 2022-07-15 DIAGNOSIS — Z00.00 PREVENTATIVE HEALTH CARE: ICD-10-CM

## 2022-07-15 DIAGNOSIS — F32.A ANXIETY AND DEPRESSION: ICD-10-CM

## 2022-07-15 DIAGNOSIS — R23.2 HOT FLASHES: ICD-10-CM

## 2022-07-15 DIAGNOSIS — Z13.220 SCREENING FOR CHOLESTEROL LEVEL: ICD-10-CM

## 2022-07-15 PROCEDURE — 77063 BREAST TOMOSYNTHESIS BI: CPT

## 2022-07-15 PROCEDURE — 99214 OFFICE O/P EST MOD 30 MIN: CPT | Performed by: NURSE PRACTITIONER

## 2022-07-15 RX ORDER — ESCITALOPRAM OXALATE 5 MG/1
5 TABLET ORAL DAILY
Qty: 30 TABLET | Refills: 1 | Status: SHIPPED | OUTPATIENT
Start: 2022-07-15 | End: 2022-08-15 | Stop reason: SDUPTHER

## 2022-07-15 NOTE — PROGRESS NOTES
Deborah Arias is a 46 y.o. female  established patient, here for evaluation of the following chief complaint(s):  Chief Complaint   Patient presents with    Bleeding/Bruising     unusual burising        Assessment and Plan  1. Essential hypertension  2. Anxiety and depression  -     escitalopram oxalate (LEXAPRO) 5 mg tablet; Take 1 Tablet by mouth daily. Indications: anxiousness associated with depression, Normal, Disp-30 Tablet, R-1  3. Hot flashes  4. Bruising  -     CBC WITH AUTOMATED DIFF; Future  5. Screening for diabetes mellitus  -     HEMOGLOBIN A1C WITH EAG; Future  6. Screening for thyroid disorder  -     T4, FREE; Future  -     TSH 3RD GENERATION; Future  7. Preventative health care  -     CBC WITH AUTOMATED DIFF; Future  -     METABOLIC PANEL, COMPREHENSIVE; Future  -     URINALYSIS W/ RFLX MICROSCOPIC; Future  -     VITAMIN D, 25 HYDROXY; Future  8. Screening for cholesterol level  -     LIPID PANEL; Future    Follow-up and Dispositions    · Return in about 1 month (around 8/15/2022) for 1-2 mon for mood (15). HPI:   In office visit. patient reports she didn't like the Effexor, \"felt like a zombi\"  for hot flashes and mood changes related to post menopausal. She stopped it and her hot flashes are better now. She works at [employer] and she likes. She was told by her GYN nurse her thyroid lab \"was low. \"  I can not see her labs. We will confirm today.      ROS:    · General: negative for - chills, fever, weight changes or malaise  · HEENT: no sore throat, nasal congestion, vision problems or ear problems  · Respiratory: no cough, shortness of breath, or wheezing  · Cardiovascular: no chest pain, palpitations, or dyspnea on exertion  · Gastrointestinal: no abdominal pain, N/V, change in bowel habits  · Musculoskeletal: no back pain or joint pain  · Neurological: no headache or dizziness  · Endo:  No polyuria or polydipsia  · : no urinary  · Skin: unknown bruising   · Psychological:+ for anxiety at times,  negative for - depression, sleeps issues    Prior to Admission medications    Medication Sig Start Date End Date Taking? Authorizing Provider   escitalopram oxalate (LEXAPRO) 5 mg tablet Take 1 Tablet by mouth daily. Indications: anxiousness associated with depression 7/15/22  Yes Vivi Woods NP   fluticasone propionate (FLONASE) 50 mcg/actuation nasal spray 2 Sprays by Both Nostrils route daily. 5/9/22  Yes Vivi Woods NP   amLODIPine (NORVASC) 5 mg tablet TAKE 1 TABLET BY MOUTH EVERY DAY  Indications: high blood pressure 1/20/22  Yes Vivi Woods NP   hydroCHLOROthiazide (HYDRODIURIL) 12.5 mg tablet Take 1 Tablet by mouth daily. Indications: high blood pressure 1/20/22  Yes Vivi Woods NP   ProAir HFA 90 mcg/actuation inhaler TAKE 1 PUFF BY INHALATION EVERY SIX HOURS AS NEEDED FOR WHEEZING OR SHORTNESS OF BREATH. 7/13/21  Yes Yamile Luevano MD   adapalene (DIFFERIN) 0.1 % topical gel APPLY TO AFFECTED AREA NIGHTLY. USE SMALL AMOUNT AS DIRECTED 1/11/21  Yes Rajiv Ramirez NP   clindamycin-benzoyl Peroxide (DUAC) 1.2 %(1 % base) -5 % SR topical gel Apply  to affected area nightly. 8/12/20  Yes Rahul Watson MD   ibuprofen (MOTRIN) 800 mg tablet Take 1 Tab by mouth every eight (8) hours as needed for Pain. Indications: Pain 6/7/19  Yes Pily Ortiz MD   ferrous sulfate (IRON) 325 mg (65 mg iron) EC tablet Take 1 Tab by mouth three (3) times daily (with meals). 6/7/19  Yes Pily Ortiz MD   fluconazole (DIFLUCAN) 150 mg tablet FDA advises cautious prescribing of oral fluconazole in pregnancy. Take 1 tablet now and 1 tablet when you complete your antibiotics. Patient not taking: Reported on 7/15/2022 2/3/22 7/15/22  Sadia Cee NP   valACYclovir (VALTREX) 1 gram tablet Take 0.5 Tablets by mouth daily.  Indications: shingles  Patient not taking: Reported on 7/15/2022 1/20/22   Sadia Cee NP   venlafaxine [facility]) 37.5 mg tablet Take 1 Tablet by mouth three (3) times daily. Patient not taking: Reported on 7/15/2022 1/20/22 7/15/22  Chanel Trujillo NP        Results for orders placed or performed during the hospital encounter of 01/25/21   INFLUENZA A & B AG (RAPID TEST)   Result Value Ref Range    Influenza A Antigen Negative NEG      Influenza B Antigen Negative NEG     CBC WITH AUTOMATED DIFF   Result Value Ref Range    WBC 5.7 4.6 - 13.2 K/uL    RBC 4.60 4.20 - 5.30 M/uL    HGB 13.8 12.0 - 16.0 g/dL    HCT 41.9 35.0 - 45.0 %    MCV 91.1 74.0 - 97.0 FL    MCH 30.0 24.0 - 34.0 PG    MCHC 32.9 31.0 - 37.0 g/dL    RDW 13.7 11.6 - 14.5 %    PLATELET 768 043 - 330 K/uL    MPV 12.1 (H) 9.2 - 11.8 FL    NEUTROPHILS 54 40 - 73 %    LYMPHOCYTES 34 21 - 52 %    MONOCYTES 7 3 - 10 %    EOSINOPHILS 4 0 - 5 %    BASOPHILS 1 0 - 2 %    ABS. NEUTROPHILS 3.1 1.8 - 8.0 K/UL    ABS. LYMPHOCYTES 1.9 0.9 - 3.6 K/UL    ABS. MONOCYTES 0.4 0.05 - 1.2 K/UL    ABS. EOSINOPHILS 0.2 0.0 - 0.4 K/UL    ABS. BASOPHILS 0.1 0.0 - 0.1 K/UL    DF AUTOMATED     METABOLIC PANEL, COMPREHENSIVE   Result Value Ref Range    Sodium 141 136 - 145 mmol/L    Potassium 4.3 3.5 - 5.5 mmol/L    Chloride 108 100 - 111 mmol/L    CO2 27 21 - 32 mmol/L    Anion gap 6 3.0 - 18 mmol/L    Glucose 94 74 - 99 mg/dL    BUN 9 7.0 - 18 MG/DL    Creatinine 0.70 0.6 - 1.3 MG/DL    BUN/Creatinine ratio 13 12 - 20      GFR est AA >60 >60 ml/min/1.73m2    GFR est non-AA >60 >60 ml/min/1.73m2    Calcium 9.1 8.5 - 10.1 MG/DL    Bilirubin, total 0.5 0.2 - 1.0 MG/DL    ALT (SGPT) 26 13 - 56 U/L    AST (SGOT) 14 10 - 38 U/L    Alk.  phosphatase 103 45 - 117 U/L    Protein, total 7.2 6.4 - 8.2 g/dL    Albumin 3.7 3.4 - 5.0 g/dL    Globulin 3.5 2.0 - 4.0 g/dL    A-G Ratio 1.1 0.8 - 1.7     LIPASE   Result Value Ref Range    Lipase 88 73 - 393 U/L   SARS-COV-2   Result Value Ref Range    SARS-CoV-2 by PCR Please find results under separate order     CARDIAC PANEL,(CK, CKMB & TROPONIN)   Result Value Ref Range    CK - MB <1.0 <3.6 ng/ml    CK-MB Index  0.0 - 4.0 %     CALCULATION NOT PERFORMED WHEN RESULT IS BELOW LINEAR LIMIT    CK 66 26 - 192 U/L    Troponin-I, QT <0.02 0.0 - 0.045 NG/ML   NOVEL CORONAVIRUS (COVID-19)   Result Value Ref Range    SARS-CoV-2 Not Detected Not Detected     URINALYSIS W/ RFLX MICROSCOPIC   Result Value Ref Range    Color YELLOW      Appearance CLEAR      Specific gravity 1.011 1.005 - 1.030      pH (UA) 6.5 5.0 - 8.0      Protein Negative NEG mg/dL    Glucose Negative NEG mg/dL    Ketone Negative NEG mg/dL    Bilirubin Negative NEG      Blood Negative NEG      Urobilinogen 0.2 0.2 - 1.0 EU/dL    Nitrites Negative NEG      Leukocyte Esterase Negative NEG     EKG, 12 LEAD, INITIAL   Result Value Ref Range    Ventricular Rate 48 BPM    Atrial Rate 48 BPM    P-R Interval 184 ms    QRS Duration 82 ms    Q-T Interval 450 ms    QTC Calculation (Bezet) 402 ms    Calculated P Axis 40 degrees    Calculated R Axis 49 degrees    Calculated T Axis 41 degrees    Diagnosis       Sinus bradycardia  Otherwise normal ECG  No previous ECGs available  Confirmed by Navneet Lai ([phone]) on 1/26/2021 3:32:07 PM          Physical Exam  Patient appears well, she is pleasant, alert, oriented x 3, in no distress. ENT normal.  Neck supple. No adenopathy or thyromegaly. LEONIE. Lungs are clear, good air entry, no wheezes  Cardiovascular, S1 and S2 normal, no murmurs, regular rate and rhythm. Chest wall negative for tenderness  Abdomen is soft without tenderness, guarding  /Anorectal, deferred. Muscleskeletal, no swelling  Extremities show no edema  Neurological is normal without focal findings. Skin: multiple unknown bruising    Psych: normal affect. Mood good. Oriented x 3.       Vitals:    07/15/22 0859   BP: 119/69   Pulse: (!) 58   Resp: 15   Temp: 97.9 °F (36.6 °C)   TempSrc: Temporal   SpO2: 97%   Weight: 179 lb (81.2 kg)   Height: 5' 3\" (1.6 m)   PainSc:   0 - No pain   LMP: 05/04/2019       *Plan of care reviewed with patient. Patient in agreement with plan and expresses understanding. All questions answered and patient encouraged to call or RTO if further questions or concerns. On this date 07/15/2022 I have spent 35 minutes reviewing previous notes, test results and face to face with the patient discussing the diagnosis and importance of compliance with the treatment plan as well as documenting on the day of the visit.       Bhavna Foley, NP-C

## 2022-07-17 LAB
25(OH)D3+25(OH)D2 SERPL-MCNC: 13 NG/ML (ref 30–100)
ALBUMIN SERPL-MCNC: 4.2 G/DL (ref 3.8–4.9)
ALBUMIN/GLOB SERPL: 1.6 {RATIO} (ref 1.2–2.2)
ALP SERPL-CCNC: 97 IU/L (ref 44–121)
ALT SERPL-CCNC: 21 IU/L (ref 0–32)
APPEARANCE UR: CLEAR
AST SERPL-CCNC: 19 IU/L (ref 0–40)
BACTERIA #/AREA URNS HPF: ABNORMAL /[HPF]
BASOPHILS # BLD AUTO: 0.1 X10E3/UL (ref 0–0.2)
BASOPHILS NFR BLD AUTO: 2 %
BILIRUB SERPL-MCNC: 0.3 MG/DL (ref 0–1.2)
BILIRUB UR QL STRIP: NEGATIVE
BUN SERPL-MCNC: 11 MG/DL (ref 6–24)
BUN/CREAT SERPL: 15 (ref 9–23)
CALCIUM SERPL-MCNC: 9.4 MG/DL (ref 8.7–10.2)
CASTS URNS QL MICRO: ABNORMAL /LPF
CHLORIDE SERPL-SCNC: 102 MMOL/L (ref 96–106)
CHOLEST SERPL-MCNC: 200 MG/DL (ref 100–199)
CO2 SERPL-SCNC: 25 MMOL/L (ref 20–29)
COLOR UR: YELLOW
CREAT SERPL-MCNC: 0.71 MG/DL (ref 0.57–1)
EGFR: 102 ML/MIN/1.73
EOSINOPHIL # BLD AUTO: 0.2 X10E3/UL (ref 0–0.4)
EOSINOPHIL NFR BLD AUTO: 4 %
EPI CELLS #/AREA URNS HPF: ABNORMAL /HPF (ref 0–10)
ERYTHROCYTE [DISTWIDTH] IN BLOOD BY AUTOMATED COUNT: 13.9 % (ref 11.7–15.4)
EST. AVERAGE GLUCOSE BLD GHB EST-MCNC: 134 MG/DL
GLOBULIN SER CALC-MCNC: 2.7 G/DL (ref 1.5–4.5)
GLUCOSE SERPL-MCNC: 106 MG/DL (ref 65–99)
GLUCOSE UR QL STRIP: NEGATIVE
HBA1C MFR BLD: 6.3 % (ref 4.8–5.6)
HCT VFR BLD AUTO: 39.6 % (ref 34–46.6)
HDLC SERPL-MCNC: 59 MG/DL
HGB BLD-MCNC: 13 G/DL (ref 11.1–15.9)
HGB UR QL STRIP: NEGATIVE
IMM GRANULOCYTES # BLD AUTO: 0 X10E3/UL (ref 0–0.1)
IMM GRANULOCYTES NFR BLD AUTO: 0 %
IMP & REVIEW OF LAB RESULTS: NORMAL
KETONES UR QL STRIP: NEGATIVE
LDLC SERPL CALC-MCNC: 128 MG/DL (ref 0–99)
LEUKOCYTE ESTERASE UR QL STRIP: NEGATIVE
LYMPHOCYTES # BLD AUTO: 1.4 X10E3/UL (ref 0.7–3.1)
LYMPHOCYTES NFR BLD AUTO: 29 %
MCH RBC QN AUTO: 28.5 PG (ref 26.6–33)
MCHC RBC AUTO-ENTMCNC: 32.8 G/DL (ref 31.5–35.7)
MCV RBC AUTO: 87 FL (ref 79–97)
MICRO URNS: ABNORMAL
MONOCYTES # BLD AUTO: 0.4 X10E3/UL (ref 0.1–0.9)
MONOCYTES NFR BLD AUTO: 8 %
NEUTROPHILS # BLD AUTO: 2.7 X10E3/UL (ref 1.4–7)
NEUTROPHILS NFR BLD AUTO: 57 %
NITRITE UR QL STRIP: POSITIVE
PH UR STRIP: 7 [PH] (ref 5–7.5)
PLATELET # BLD AUTO: 202 X10E3/UL (ref 150–450)
POTASSIUM SERPL-SCNC: 3.9 MMOL/L (ref 3.5–5.2)
PROT SERPL-MCNC: 6.9 G/DL (ref 6–8.5)
PROT UR QL STRIP: NEGATIVE
RBC # BLD AUTO: 4.56 X10E6/UL (ref 3.77–5.28)
RBC #/AREA URNS HPF: ABNORMAL /HPF (ref 0–2)
SODIUM SERPL-SCNC: 140 MMOL/L (ref 134–144)
SP GR UR STRIP: 1.01 (ref 1–1.03)
T4 FREE SERPL-MCNC: 1.24 NG/DL (ref 0.82–1.77)
TRIGL SERPL-MCNC: 70 MG/DL (ref 0–149)
TSH SERPL DL<=0.005 MIU/L-ACNC: 0.42 UIU/ML (ref 0.45–4.5)
UROBILINOGEN UR STRIP-MCNC: 0.2 MG/DL (ref 0.2–1)
VLDLC SERPL CALC-MCNC: 13 MG/DL (ref 5–40)
WBC # BLD AUTO: 4.9 X10E3/UL (ref 3.4–10.8)
WBC #/AREA URNS HPF: ABNORMAL /HPF (ref 0–5)

## 2022-08-04 ENCOUNTER — TELEPHONE (OUTPATIENT)
Dept: FAMILY MEDICINE CLINIC | Age: 52
End: 2022-08-04

## 2022-08-04 NOTE — TELEPHONE ENCOUNTER
Patient called [facility] stating that she is dizzie and has ear pain ,spoke with Phuc Alcantara and she told me to tell her to go to urgent care.

## 2022-08-15 DIAGNOSIS — I10 ESSENTIAL HYPERTENSION: ICD-10-CM

## 2022-08-15 DIAGNOSIS — F32.A ANXIETY AND DEPRESSION: ICD-10-CM

## 2022-08-15 DIAGNOSIS — F41.9 ANXIETY AND DEPRESSION: ICD-10-CM

## 2022-08-15 RX ORDER — HYDROCHLOROTHIAZIDE 12.5 MG/1
12.5 TABLET ORAL DAILY
Qty: 90 TABLET | Refills: 3 | Status: SHIPPED | OUTPATIENT
Start: 2022-08-15

## 2022-08-15 RX ORDER — ESCITALOPRAM OXALATE 5 MG/1
5 TABLET ORAL DAILY
Qty: 90 TABLET | Refills: 0 | Status: SHIPPED | OUTPATIENT
Start: 2022-08-15

## 2022-08-18 NOTE — PROGRESS NOTES
Diane Lipoma presents today for   Chief Complaint   Patient presents with    Immunization/Injection    Medication Refill       Diane Lipoma preferred language for health care discussion is english/other. Personal Protective Equipment:   Personal Protective Equipment was used including: mask-surgical and hands-gloves. Patient was placed on no precaution(s). Patient was masked. Precautions:   Patient currently on None  Patient currently roomed with door closed    Is someone accompanying this pt? No    Is the patient using any DME equipment during OV? No    Depression Screening:  3 most recent PHQ Screens 7/14/2021   Little interest or pleasure in doing things Not at all   Feeling down, depressed, irritable, or hopeless Not at all   Total Score PHQ 2 0       Learning Assessment:  Learning Assessment 5/9/2019   PRIMARY LEARNER Patient   PRIMARY LANGUAGE ENGLISH   LEARNER PREFERENCE PRIMARY OTHER (COMMENT)   ANSWERED BY patient   RELATIONSHIP SELF       Abuse Screening:  Abuse Screening Questionnaire 4/3/2020   Do you ever feel afraid of your partner? N   Are you in a relationship with someone who physically or mentally threatens you? N   Is it safe for you to go home? Y       Fall Risk  Fall Risk Assessment, last 12 mths 4/3/2020   Able to walk? Yes   Fall in past 12 months? No       Pt currently taking Anticoagulant therapy? No    Coordination of Care:  1. Have you been to the ER, urgent care clinic since your last visit? Hospitalized since your last visit? No    2. Have you seen or consulted any other health care providers outside of the [address] [place] [address] since your last visit? Include any pap smears or colon screening.  No 1. Continue regular diet + add Ensure Enlive QD (350kcal/20gpro)   2. Encourage PO intake  3. Consider addition of remeron   4. RD to remain available prn

## 2022-08-29 DIAGNOSIS — E55.9 VITAMIN D DEFICIENCY: Primary | ICD-10-CM

## 2022-08-29 RX ORDER — ERGOCALCIFEROL 1.25 MG/1
50000 CAPSULE ORAL
Qty: 12 CAPSULE | Refills: 0 | Status: SHIPPED | OUTPATIENT
Start: 2022-08-29

## 2022-08-29 NOTE — PROGRESS NOTES
Please call patient. A1c 6.3%, prediabetic. CBC unremarkable. Metabolic panel unremarkable. Your TSH was slightly low and your T4 was normal.  We should recheck your TSH. Please call to make an appointment to follow-up. You had nitrites in your urine. Let me know if you have any symptoms of a urinary tract infection. Your vitamin D was low. Your cholesterol is high. I will order you high-dose vitamin D weekly for 12 weeks and you will do a daily dose and you will get low again.

## 2022-09-16 ENCOUNTER — VIRTUAL VISIT (OUTPATIENT)
Dept: FAMILY MEDICINE CLINIC | Age: 52
End: 2022-09-16
Payer: MEDICAID

## 2022-09-16 DIAGNOSIS — R19.7 DIARRHEA, UNSPECIFIED TYPE: ICD-10-CM

## 2022-09-16 DIAGNOSIS — E66.09 CLASS 1 OBESITY DUE TO EXCESS CALORIES WITHOUT SERIOUS COMORBIDITY WITH BODY MASS INDEX (BMI) OF 31.0 TO 31.9 IN ADULT: ICD-10-CM

## 2022-09-16 DIAGNOSIS — R73.03 PREDIABETES: ICD-10-CM

## 2022-09-16 DIAGNOSIS — R05.9 COUGH: ICD-10-CM

## 2022-09-16 DIAGNOSIS — U07.1 COVID-19: Primary | ICD-10-CM

## 2022-09-16 DIAGNOSIS — R68.83 CHILL: ICD-10-CM

## 2022-09-16 DIAGNOSIS — R52 BODY ACHES: ICD-10-CM

## 2022-09-16 PROCEDURE — 99213 OFFICE O/P EST LOW 20 MIN: CPT | Performed by: NURSE PRACTITIONER

## 2022-09-16 NOTE — PROGRESS NOTES
Xavier Rivera is a 46 y.o. female  established patient, here for evaluation of the following chief complaint(s):  No chief complaint on file. Assessment and Plan  1. COVID-19  -     molnupiravir 200 mg capsule; Take 4 Capsules by mouth every twelve (12) hours. Indications: [id number] (emergency use authorization), Normal, Disp-40 Capsule, R-0  2. Diarrhea, unspecified type  3. Chill  4. Body aches  5. Cough  6. Class 1 obesity due to excess calories without serious comorbidity with body mass index (BMI) of 31.0 to 31.9 in adult  7. Prediabetes     Xavier Rivera, who was evaluated through a synchronous (real-time) audio-video encounter, and/or her healthcare decision maker, is aware that it is a billable service, which includes applicable co-pays, with coverage as determined by her insurance carrier. She provided verbal consent to proceed and patient identification was verified. This visit was conducted pursuant to the emergency declaration under the [address] [place] [address], [address] [place] [address] authority and the [employer] General Act. A caregiver was present when appropriate. Ability to conduct physical exam was limited. The patient was located at: Home: [address]  [place] [id number]  The provider was located at: Facility (Appt Department): [address] [place] [address] 4  [address]  [phone]    --Barrington Welch NP on 9/26/2022 at 5:11 PM    HPI:   Virtual visit. Patient says she started yesterday 9/15/2022 with body aches chills diarrhea and cough. She has had a fever of 102 and is taking Tylenol with good results. She said she tested positive for COVID today 9/16/2022 on a home rapid test.  She said she got COVID from her grandbaby.     ROS:    General: Positive for - chills, fever and body aches  HEENT: Positive for nasal congestion negative for sore throat  Respiratory: Positive for cough, negative for shortness of breath, or wheezing  Cardiovascular: no chest pain, palpitations, or dyspnea on exertion  Gastrointestinal: no abdominal pain, N/V, change in bowel habits  Musculoskeletal: no back pain or joint pain  Neurological: no headache or dizziness  Endo:  No polyuria or polydipsia  : no urinary  Psychological: negative for - anxiety, depression, sleeps issues    Prior to Admission medications    Medication Sig Start Date End Date Taking? Authorizing Provider   molnupiravir 200 mg capsule Take 4 Capsules by mouth every twelve (12) hours. Indications: COVID-19 (emergency use authorization) 9/16/22  Yes Angelica OCHOA NP   benzonatate (TESSALON) 200 mg capsule Take 1 Capsule by mouth three (3) times daily as needed for Cough for up to 7 days. 9/20/22 9/27/22  Willi Goodrich NP   ergocalciferol (ERGOCALCIFEROL) 1,250 mcg (50,000 unit) capsule Take 1 Capsule by mouth every seven (7) days. Take 1 cap every week til all gone then take over the counter Vit D 4000 units daily 8/29/22   Angelica OCHOA NP   escitalopram oxalate (LEXAPRO) 5 mg tablet Take 1 Tablet by mouth in the morning. Indications: anxiousness associated with depression 8/15/22   Angelica OCHOA NP   hydroCHLOROthiazide (HYDRODIURIL) 12.5 mg tablet Take 1 Tablet by mouth in the morning. Indications: high blood pressure 8/15/22   Arleth Woods NP   fluticasone propionate (FLONASE) 50 mcg/actuation nasal spray 2 Sprays by Both Nostrils route daily. 5/9/22   Willi Goodrich NP   valACYclovir (VALTREX) 1 gram tablet Take 0.5 Tablets by mouth daily.  Indications: shingles  Patient not taking: Reported on 7/15/2022 1/20/22   Willi Goodrich NP   amLODIPine (NORVASC) 5 mg tablet TAKE 1 TABLET BY MOUTH EVERY DAY  Indications: high blood pressure 1/20/22   Danie Woods NP   ProAir HFA 90 mcg/actuation inhaler TAKE 1 PUFF BY INHALATION EVERY SIX HOURS AS NEEDED FOR WHEEZING OR SHORTNESS OF BREATH. 7/13/21   MD cammy Nugent (DIFFERIN) 0.1 % topical gel APPLY TO AFFECTED AREA NIGHTLY. USE SMALL AMOUNT AS DIRECTED 1/11/21   Abhay Reza NP   clindamycin-benzoyl Peroxide (DUAC) 1.2 %(1 % base) -5 % SR topical gel Apply  to affected area nightly. 8/12/20   Jeffrey Calle MD   ibuprofen (MOTRIN) 800 mg tablet Take 1 Tab by mouth every eight (8) hours as needed for Pain. Indications: Pain 6/7/19   Sean Mayo MD   ferrous sulfate (IRON) 325 mg (65 mg iron) EC tablet Take 1 Tab by mouth three (3) times daily (with meals). 6/7/19   Sean Mayo MD        Results for orders placed or performed in visit on 07/15/22   HEMOGLOBIN A1C WITH EAG   Result Value Ref Range    Hemoglobin A1c 6.3 (H) 4.8 - 5.6 %    Estimated average glucose 134 mg/dL   CBC WITH AUTOMATED DIFF   Result Value Ref Range    WBC 4.9 3.4 - 10.8 x10E3/uL    RBC 4.56 3.77 - 5.28 x10E6/uL    HGB 13.0 11.1 - 15.9 g/dL    HCT 39.6 34.0 - 46.6 %    MCV 87 79 - 97 fL    MCH 28.5 26.6 - 33.0 pg    MCHC 32.8 31.5 - 35.7 g/dL    RDW 13.9 11.7 - 15.4 %    PLATELET 593 031 - 510 x10E3/uL    NEUTROPHILS 57 Not Estab. %    Lymphocytes 29 Not Estab. %    MONOCYTES 8 Not Estab. %    EOSINOPHILS 4 Not Estab. %    BASOPHILS 2 Not Estab. %    ABS. NEUTROPHILS 2.7 1.4 - 7.0 x10E3/uL    Abs Lymphocytes 1.4 0.7 - 3.1 x10E3/uL    ABS. MONOCYTES 0.4 0.1 - 0.9 x10E3/uL    ABS. EOSINOPHILS 0.2 0.0 - 0.4 x10E3/uL    ABS. BASOPHILS 0.1 0.0 - 0.2 x10E3/uL    IMMATURE GRANULOCYTES 0 Not Estab. %    ABS. IMM.  GRANS. 0.0 0.0 - 0.1 [id number]   METABOLIC PANEL, COMPREHENSIVE   Result Value Ref Range    Glucose 106 (H) 65 - 99 mg/dL    BUN 11 6 - 24 mg/dL    Creatinine 0.71 0.57 - 1.00 mg/dL    eGFR 102 >59 mL/min/1.73    BUN/Creatinine ratio 15 9 - 23    Sodium 140 134 - 144 mmol/L    Potassium 3.9 3.5 - 5.2 mmol/L    Chloride 102 96 - 106 mmol/L    CO2 25 20 - 29 mmol/L    Calcium 9.4 8.7 - 10.2 mg/dL    Protein, total 6.9 6.0 - 8.5 g/dL    Albumin 4.2 3.8 - 4.9 g/dL    GLOBULIN, TOTAL 2.7 1.5 - 4.5 g/dL    A-G Ratio 1.6 1.2 - 2.2    Bilirubin, total 0.3 0.0 - 1.2 mg/dL    Alk. phosphatase 97 44 - 121 IU/L    AST (SGOT) 19 0 - 40 IU/L    ALT (SGPT) 21 0 - 32 IU/L   T4, FREE   Result Value Ref Range    T4, Free 1.24 0.82 - 1.77 ng/dL   TSH 3RD GENERATION   Result Value Ref Range    TSH 0.421 (L) 0.450 - 4.500 uIU/mL   URINALYSIS W/ RFLX MICROSCOPIC   Result Value Ref Range    Specific Gravity 1.011 1.005 - 1.030    pH (UA) 7.0 5.0 - 7.5    Color Yellow Yellow    Appearance Clear Clear    Leukocyte Esterase Negative Negative    Protein Negative Negative/Trace    Glucose Negative Negative    Ketone Negative Negative    Blood Negative Negative    Bilirubin Negative Negative    Urobilinogen 0.2 0.2 - 1.0 mg/dL    Nitrites Positive (A) Negative    Microscopic Examination See additional order    VITAMIN D, 25 HYDROXY   Result Value Ref Range    VITAMIN D, 25-HYDROXY 13.0 (L) 30.0 - 100.0 ng/mL   LIPID PANEL   Result Value Ref Range    Cholesterol, total 200 (H) 100 - 199 mg/dL    Triglyceride 70 0 - 149 mg/dL    HDL Cholesterol 59 >39 mg/dL    VLDL, calculated 13 5 - 40 mg/dL    LDL, calculated 128 (H) 0 - 99 mg/dL   MICROSCOPIC EXAMINATION   Result Value Ref Range    WBC 11-30 (A) 0 - 5 /hpf    RBC None seen 0 - 2 /hpf    Epithelial cells 0-10 0 - 10 /hpf    Casts None seen None seen /lpf    Bacteria Many (A) None seen/Few   CVD REPORT   Result Value Ref Range    INTERPRETATION Note         Physical Exam  The patient appears ill, is pleasant, alert, oriented x 3, in no distress. Eyes, no discharge. Normal nose. Head, normocephalic and atraumatic. Nose appears normal.  Neck, no visible neck mass. Lungs, appears to have normal respiratory effort     Skin, no jaundice and patient does not appear pale. Psych: normal affect. Mood good. Oriented x 3. Judgement and insight intact. There were no vitals filed for this visit. *Plan of care reviewed with patient.  Patient in agreement with plan and expresses understanding. All questions answered and patient encouraged to call or RTO if further questions or concerns. On this date 09/16/2022 I have spent 22 minutes reviewing previous notes, test results and face to face with the patient discussing the diagnosis and importance of compliance with the treatment plan as well as documenting on the day of the visit.       DAVID ShahC

## 2022-09-20 ENCOUNTER — TELEPHONE (OUTPATIENT)
Dept: FAMILY MEDICINE CLINIC | Age: 52
End: 2022-09-20

## 2022-09-20 DIAGNOSIS — R05.9 COUGH: Primary | ICD-10-CM

## 2022-09-20 RX ORDER — BENZONATATE 200 MG/1
200 CAPSULE ORAL
Qty: 21 CAPSULE | Refills: 0 | Status: SHIPPED | OUTPATIENT
Start: 2022-09-20 | End: 2022-09-27

## 2022-09-20 NOTE — TELEPHONE ENCOUNTER
----- Message from Shara Ambrocio sent at 9/19/2022  8:40 AM EDT -----  Subject: Message to Provider    QUESTIONS  Information for Provider? pt covid positive 9/16/22. Called to speak with   nurse about terrible cough  ---------------------------------------------------------------------------  --------------  Magda Tillman INFO  [phone]; OK to leave message on voicemail  ---------------------------------------------------------------------------  --------------  SCRIPT ANSWERS  Relationship to Patient?  Self

## 2022-10-19 ENCOUNTER — VIRTUAL VISIT (OUTPATIENT)
Dept: FAMILY MEDICINE CLINIC | Age: 52
End: 2022-10-19
Payer: MEDICARE

## 2022-10-19 DIAGNOSIS — B37.9 YEAST INFECTION: ICD-10-CM

## 2022-10-19 DIAGNOSIS — H66.92 ACUTE INFECTION OF LEFT EAR: Primary | ICD-10-CM

## 2022-10-19 PROCEDURE — G8428 CUR MEDS NOT DOCUMENT: HCPCS | Performed by: STUDENT IN AN ORGANIZED HEALTH CARE EDUCATION/TRAINING PROGRAM

## 2022-10-19 PROCEDURE — G8432 DEP SCR NOT DOC, RNG: HCPCS | Performed by: STUDENT IN AN ORGANIZED HEALTH CARE EDUCATION/TRAINING PROGRAM

## 2022-10-19 PROCEDURE — 3017F COLORECTAL CA SCREEN DOC REV: CPT | Performed by: STUDENT IN AN ORGANIZED HEALTH CARE EDUCATION/TRAINING PROGRAM

## 2022-10-19 PROCEDURE — G8756 NO BP MEASURE DOC: HCPCS | Performed by: STUDENT IN AN ORGANIZED HEALTH CARE EDUCATION/TRAINING PROGRAM

## 2022-10-19 PROCEDURE — G9899 SCRN MAM PERF RSLTS DOC: HCPCS | Performed by: STUDENT IN AN ORGANIZED HEALTH CARE EDUCATION/TRAINING PROGRAM

## 2022-10-19 PROCEDURE — 99214 OFFICE O/P EST MOD 30 MIN: CPT | Performed by: STUDENT IN AN ORGANIZED HEALTH CARE EDUCATION/TRAINING PROGRAM

## 2022-10-19 RX ORDER — AMOXICILLIN 875 MG/1
875 TABLET, FILM COATED ORAL 2 TIMES DAILY
Qty: 14 TABLET | Refills: 0 | Status: SHIPPED | OUTPATIENT
Start: 2022-10-19 | End: 2022-10-26

## 2022-10-19 RX ORDER — FLUCONAZOLE 150 MG/1
150 TABLET ORAL DAILY
Qty: 1 TABLET | Refills: 0 | Status: SHIPPED | OUTPATIENT
Start: 2022-10-19 | End: 2022-10-20

## 2022-10-19 NOTE — PROGRESS NOTES
Germán Cote is a 46 y.o.  female and presents with    No chief complaint on file. Germán Cote, who was evaluated through a synchronous (real-time) audio-video encounter, and/or her healthcare decision maker, is aware that it is a billable service, which includes applicable co-pays, with coverage as determined by her insurance carrier. She provided verbal consent to proceed and patient identification was verified. This visit was conducted pursuant to the emergency declaration under the [address] [place] [address] and the [employer] Act. A caregiver was present when appropriate. Ability to conduct physical exam was limited. The patient was located at: Home: [address]  [place] [address]  The provider was located at: Facility (Appt Department): [address] [place] [address] 4  [address]  [phone]    --Theresa Vidal MD on 10/19/2022 at 2:32 PM        Subjective:    Patient states that 2 weeks ago she was tested positive for COVID. She had significant myalgias, fevers, sore throat, cough, loss of taste and diarrhea. States that most of the symptoms have resolved, except that she continues to have left ear pain, sore throat, and cough during the nighttime. She notes now that she has a sour taste to her mouth. Patient states that she continues to have intermittent fevers with last fever being last night with a T-max of 101. Patient is concerned due to the symptoms being over 2 weeks. Denies shortness of breath at the time. 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      Past Medical History:   Diagnosis Date    Essential hypertension 5/21/2019    Iron deficiency anemia due to chronic blood loss 2019    Low iron     Neuropraxia of right lower extremity 2019      Past Surgical History:   Procedure Laterality Date    HX  SECTION      HX TOTAL LAPAROSCOPIC HYSTERECTOMY        Family History   Problem Relation Age of Onset    Breast Cancer Mother         52's    Breast Cancer Sister         52's    No Known Problems Father      Social History     Socioeconomic History    Marital status: SINGLE     Spouse name: Not on file    Number of children: Not on file    Years of education: Not on file    Highest education level: Not on file   Occupational History    Not on file   Tobacco Use    Smoking status: Never    Smokeless tobacco: Never   Substance and Sexual Activity    Alcohol use: Yes     Comment: occasional    Drug use: No    Sexual activity: Not Currently     Partners: Male     Birth control/protection: None   Other Topics Concern    Not on file   Social History Narrative    Not on file     Social Determinants of Health     Financial Resource Strain: Not on file   Food Insecurity: Not on file   Transportation Needs: Not on file   Physical Activity: Not on file   Stress: Not on file   Social Connections: Not on file   Intimate Partner Violence: Not on file   Housing Stability: Not on file        Current Outpatient Medications   Medication Sig Dispense Refill    molnupiravir 200 mg capsule Take 4 Capsules by mouth every twelve (12) hours. Indications: COVID-19 (emergency use authorization) 40 Capsule 0    ergocalciferol (ERGOCALCIFEROL) 1,250 mcg (50,000 unit) capsule Take 1 Capsule by mouth every seven (7) days. Take 1 cap every week til all gone then take over the counter Vit D 4000 units daily 12 Capsule 0    escitalopram oxalate (LEXAPRO) 5 mg tablet Take 1 Tablet by mouth in the morning. Indications: anxiousness associated with depression 90 Tablet 0    hydroCHLOROthiazide (HYDRODIURIL) 12.5 mg tablet Take 1 Tablet by mouth in the morning.  Indications: high blood pressure 90 Tablet 3    fluticasone propionate (FLONASE) 50 mcg/actuation nasal spray 2 Sprays by Both Nostrils route daily. 1 Each 2    valACYclovir (VALTREX) 1 gram tablet Take 0.5 Tablets by mouth daily. Indications: shingles (Patient not taking: Reported on 7/15/2022) 60 Tablet 1    amLODIPine (NORVASC) 5 mg tablet TAKE 1 TABLET BY MOUTH EVERY DAY  Indications: high blood pressure 30 Tablet 5    ProAir HFA 90 mcg/actuation inhaler TAKE 1 PUFF BY INHALATION EVERY SIX HOURS AS NEEDED FOR WHEEZING OR SHORTNESS OF BREATH. 8.5 Inhaler 0    adapalene (DIFFERIN) 0.1 % topical gel APPLY TO AFFECTED AREA NIGHTLY. USE SMALL AMOUNT AS DIRECTED 45 g 0    clindamycin-benzoyl Peroxide (DUAC) 1.2 %(1 % base) -5 % SR topical gel Apply  to affected area nightly. 1 Tube 0    ibuprofen (MOTRIN) 800 mg tablet Take 1 Tab by mouth every eight (8) hours as needed for Pain. Indications: Pain 30 Tab 0    ferrous sulfate (IRON) 325 mg (65 mg iron) EC tablet Take 1 Tab by mouth three (3) times daily (with meals). 90 Tab 3        ROS   Review of Systems   All other systems reviewed and are negative. Objective: There were no vitals filed for this visit. Physical Exam  Constitutional:       Appearance: Normal appearance. Eyes:      General: No scleral icterus. Right eye: No discharge. Left eye: No discharge. Pulmonary:      Effort: Pulmonary effort is normal. No respiratory distress. Musculoskeletal:      Cervical back: Normal range of motion and neck supple. Neurological:      Mental Status: She is alert and oriented to person, place, and time. Cranial Nerves: No cranial nerve deficit. Psychiatric:         Mood and Affect: Mood normal.         Behavior: Behavior normal.         Thought Content: Thought content normal.         Judgment: Judgment normal.         LABS     TESTS      Assessment/Plan:    1. Acute infection of left ear  - amoxicillin (AMOXIL) 875 mg tablet;  Take 1 Tablet by mouth two (2) times a day for 7 days. Dispense: 14 Tablet; Refill: 0    2. Yeast infection  - fluconazole (DIFLUCAN) 150 mg tablet; Take 1 Tablet by mouth daily for 1 day. FDA advises cautious prescribing of oral fluconazole in pregnancy. Dispense: 1 Tablet; Refill: 0      Lab review: no lab studies available for review at time of visit      I have discussed the diagnosis with the patient and the intended plan as seen in the above orders. The patient has received an after-visit summary and questions were answered concerning future plans. I have discussed medication side effects and warnings with the patient as well. I have reviewed the plan of care with the patient, accepted their input and they are in agreement with the treatment goals.          Bud Queen MD

## 2023-01-25 ENCOUNTER — OFFICE VISIT (OUTPATIENT)
Dept: FAMILY MEDICINE CLINIC | Age: 53
End: 2023-01-25
Payer: MEDICARE

## 2023-01-25 VITALS
TEMPERATURE: 97.3 F | HEART RATE: 59 BPM | OXYGEN SATURATION: 97 % | WEIGHT: 175 LBS | HEIGHT: 63 IN | DIASTOLIC BLOOD PRESSURE: 87 MMHG | BODY MASS INDEX: 31.01 KG/M2 | SYSTOLIC BLOOD PRESSURE: 125 MMHG | RESPIRATION RATE: 16 BRPM

## 2023-01-25 DIAGNOSIS — M25.511 CHRONIC RIGHT SHOULDER PAIN: ICD-10-CM

## 2023-01-25 DIAGNOSIS — Z12.11 SCREEN FOR COLON CANCER: ICD-10-CM

## 2023-01-25 DIAGNOSIS — I10 ESSENTIAL HYPERTENSION: Primary | ICD-10-CM

## 2023-01-25 DIAGNOSIS — Z11.59 NEED FOR HEPATITIS C SCREENING TEST: ICD-10-CM

## 2023-01-25 DIAGNOSIS — Z71.89 ADVANCED DIRECTIVES, COUNSELING/DISCUSSION: ICD-10-CM

## 2023-01-25 DIAGNOSIS — R73.03 PREDIABETES: ICD-10-CM

## 2023-01-25 DIAGNOSIS — F32.A ANXIETY AND DEPRESSION: ICD-10-CM

## 2023-01-25 DIAGNOSIS — B00.1 FEVER BLISTER: ICD-10-CM

## 2023-01-25 DIAGNOSIS — G89.29 CHRONIC RIGHT SHOULDER PAIN: ICD-10-CM

## 2023-01-25 DIAGNOSIS — Z00.00 MEDICARE ANNUAL WELLNESS VISIT, SUBSEQUENT: ICD-10-CM

## 2023-01-25 DIAGNOSIS — F41.9 ANXIETY AND DEPRESSION: ICD-10-CM

## 2023-01-25 DIAGNOSIS — Z78.9 FULL CODE STATUS: ICD-10-CM

## 2023-01-25 LAB — HBA1C MFR BLD HPLC: 6.3 %

## 2023-01-25 PROCEDURE — 3017F COLORECTAL CA SCREEN DOC REV: CPT | Performed by: NURSE PRACTITIONER

## 2023-01-25 PROCEDURE — 99497 ADVNCD CARE PLAN 30 MIN: CPT | Performed by: NURSE PRACTITIONER

## 2023-01-25 PROCEDURE — 99214 OFFICE O/P EST MOD 30 MIN: CPT | Performed by: NURSE PRACTITIONER

## 2023-01-25 PROCEDURE — G8427 DOCREV CUR MEDS BY ELIG CLIN: HCPCS | Performed by: NURSE PRACTITIONER

## 2023-01-25 PROCEDURE — G9899 SCRN MAM PERF RSLTS DOC: HCPCS | Performed by: NURSE PRACTITIONER

## 2023-01-25 PROCEDURE — 83036 HEMOGLOBIN GLYCOSYLATED A1C: CPT | Performed by: NURSE PRACTITIONER

## 2023-01-25 PROCEDURE — G0439 PPPS, SUBSEQ VISIT: HCPCS | Performed by: NURSE PRACTITIONER

## 2023-01-25 PROCEDURE — G8417 CALC BMI ABV UP PARAM F/U: HCPCS | Performed by: NURSE PRACTITIONER

## 2023-01-25 PROCEDURE — G8432 DEP SCR NOT DOC, RNG: HCPCS | Performed by: NURSE PRACTITIONER

## 2023-01-25 RX ORDER — IBUPROFEN 800 MG/1
800 TABLET ORAL
Qty: 90 TABLET | Refills: 0 | Status: SHIPPED | OUTPATIENT
Start: 2023-01-25

## 2023-01-25 RX ORDER — DEXTROMETHORPHAN HYDROBROMIDE, GUAIFENESIN 5; 100 MG/5ML; MG/5ML
650 LIQUID ORAL EVERY 8 HOURS
Qty: 90 TABLET | Refills: 0 | Status: SHIPPED | OUTPATIENT
Start: 2023-01-25

## 2023-01-25 RX ORDER — DULOXETIN HYDROCHLORIDE 30 MG/1
30 CAPSULE, DELAYED RELEASE ORAL 2 TIMES DAILY
Qty: 30 CAPSULE | Refills: 1 | Status: SHIPPED | OUTPATIENT
Start: 2023-01-25

## 2023-01-25 RX ORDER — VALACYCLOVIR HYDROCHLORIDE 1 G/1
2000 TABLET, FILM COATED ORAL DAILY
Qty: 30 TABLET | Refills: 1 | Status: SHIPPED | OUTPATIENT
Start: 2023-01-25

## 2023-01-25 RX ORDER — LIDOCAINE 50 MG/G
PATCH TOPICAL
Qty: 15 EACH | Refills: 1 | Status: SHIPPED | OUTPATIENT
Start: 2023-01-25

## 2023-01-25 NOTE — PATIENT INSTRUCTIONS
Medicare Wellness Visit, Female     The best way to live healthy is to have a lifestyle where you eat a well-balanced diet, exercise regularly, limit alcohol use, and quit all forms of tobacco/nicotine, if applicable. Regular preventive services are another way to keep healthy. Preventive services (vaccines, screening tests, monitoring & exams) can help personalize your care plan, which helps you manage your own care. Screening tests can find health problems at the earliest stages, when they are easiest to treat. [facility] follows the current, evidence-based guidelines published by the [facility] ([facility]) when recommending preventive services for our patients. Because we follow these guidelines, sometimes recommendations change over time as research supports it. (For example, mammograms used to be recommended annually. Even though Medicare will still pay for an annual mammogram, the newer guidelines recommend a mammogram every two years for women of average risk). Of course, you and your doctor may decide to screen more often for some diseases, based on your risk and your co-morbidities (chronic disease you are already diagnosed with). Preventive services for you include:  - Medicare offers their members a free annual wellness visit, which is time for you and your primary care provider to discuss and plan for your preventive service needs.  Take advantage of this benefit every year!    -Over the age of 72 should receive the recommended pneumonia vaccines.    -All adults should have a flu vaccine yearly.  -All adults should have a tetanus vaccine every 10 years.   -Over the age 48 should receive the shingles vaccines.        -All adults should be screened once for Hepatitis C.  -All adults age 38-68 who are overweight should have a diabetes screening test once every three years.   -Other screening tests and preventive services for persons with diabetes include: an eye exam to screen for diabetic retinopathy, a kidney function test, a foot exam, and stricter control over your cholesterol.   -Cardiovascular screening for adults with routine risk involves an electrocardiogram (ECG) at intervals determined by your doctor.     -Colorectal cancer screenings should be done for adults age 39-70 with no increased risk factors for colorectal cancer. There are a number of acceptable methods of screening for this type of cancer. Each test has its own benefits and drawbacks. Discuss with your doctor what is most appropriate for you during your annual wellness visit. The different tests include: colonoscopy (considered the best screening method), a fecal occult blood test, a fecal DNA test, and sigmoidoscopy.    -Lung cancer screening is recommended annually with a low dose CT scan for adults between age 54 and 68, who have smoked at least 30 pack years (equivalent of 1 pack per day for 30 days), and who is a current smoker or quit less than 15 years ago.    -A bone mass density test is recommended when a woman turns 65 to screen for osteoporosis. This test is only recommended one time, as a screening. Some providers will use this same test as a disease monitoring tool if you already have osteoporosis. -Breast cancer screenings are recommended every other year for women of normal risk, age 54-69.    -Cervical cancer screenings for women over age 72 are only recommended with certain risk factors.      Here is a list of your current Health Maintenance items (your personalized list of preventive services) with a due date:  Health Maintenance Due   Topic Date Due    Hepatitis C Test  Never done    COVID-19 Vaccine (1) Never done    Colorectal Screening  Never done    Shingles Vaccine (1 of 2) Never done    Yearly Flu Vaccine (1) Never done    Annual Well Visit  Never done

## 2023-01-25 NOTE — PROGRESS NOTES
Angeles Araujo is a 46 y.o. female  established patient, here for evaluation of the following chief complaint(s):  Chief Complaint   Patient presents with    Sleep Problem    Depression    Shoulder Pain        Assessment and Plan  1. Essential hypertension  2. Prediabetes  -     AMB POC HEMOGLOBIN A1C  3. Medicare annual wellness visit, subsequent  4. Full code status  5. Screen for colon cancer  -     COLOGUARD TEST (FECAL DNA COLORECTAL CANCER SCREENING)  6. Need for hepatitis C screening test  -     HEPATITIS C AB; Future  7. Advanced directives, counseling/discussion  -     FULL CODE  8. Chronic right shoulder pain  -     lidocaine (LIDODERM) 5 %; Apply patch to the affected area for 12 hours a day and remove for 12 hours a day., Normal, Disp-15 Each, R-1  -     ibuprofen (MOTRIN) 800 mg tablet; Take 1 Tablet by mouth every eight (8) hours as needed for Pain. Indications: pain, Normal, Disp-90 Tablet, R-0  -     acetaminophen (Tylenol Arthritis Pain) 650 mg TbER; Take 1 Tablet by mouth every eight (8) hours. , Normal, Disp-90 Tablet, R-0  -     DULoxetine (Cymbalta) 30 mg capsule; Take 1 Capsule by mouth two (2) times a day. Indications: anxiousness associated with depression, chronic muscle or bone pain, Normal, Disp-30 Capsule, R-1  9. Anxiety and depression  -     DULoxetine (Cymbalta) 30 mg capsule; Take 1 Capsule by mouth two (2) times a day. Indications: anxiousness associated with depression, chronic muscle or bone pain, Normal, Disp-30 Capsule, R-1  10. Fever blister  -     valACYclovir (VALTREX) 1 gram tablet; Take 2 Tablets by mouth daily. Take 2 tabs by mouth for one day at onset of cold sore, Normal, Disp-30 Tablet, R-1     Follow-up and Dispositions    Return in about 3 months (around 4/25/2023) for routine, 20. HPI:   In office visit. Patient appears well. She has right shoulder pain and difficult to raise her right arm for \"months. \" She has taken Tylenol, Advil and heat pad for her shoulder pain without good results. She this pain comes and goes in her right shoulder. This pain can be worse at night when she tries to lay on her right shoulder or at work when she is \"typing. \"     ROS:    General: negative for - chills, fever, weight changes or malaise  HEENT: no sore throat, nasal congestion, vision problems or ear problems  Respiratory: no cough, shortness of breath, or wheezing  Cardiovascular: no chest pain, palpitations, or dyspnea on exertion  Gastrointestinal: no abdominal pain, N/V, change in bowel habits  Musculoskeletal: see HPI  Neurological: no headache or dizziness  Endo:  No polyuria or polydipsia  : no urinary  Psychological: negative for - anxiety, depression, sleeps issues    Prior to Admission medications    Medication Sig Start Date End Date Taking? Authorizing Provider   lidocaine (LIDODERM) 5 % Apply patch to the affected area for 12 hours a day and remove for 12 hours a day. 1/25/23  Yes Poncho Woods NP   ibuprofen (MOTRIN) 800 mg tablet Take 1 Tablet by mouth every eight (8) hours as needed for Pain. Indications: pain 1/25/23  Yes Poncho Woods NP   acetaminophen (Tylenol Arthritis Pain) 650 mg TbER Take 1 Tablet by mouth every eight (8) hours. 1/25/23  Yes Poncho Woods NP   DULoxetine (Cymbalta) 30 mg capsule Take 1 Capsule by mouth two (2) times a day. Indications: anxiousness associated with depression, chronic muscle or bone pain 1/25/23  Yes Vivi Woods NP   valACYclovir (VALTREX) 1 gram tablet Take 2 Tablets by mouth daily. Take 2 tabs by mouth for one day at onset of cold sore 1/25/23  Yes Vivi Woods NP   amLODIPine (NORVASC) 5 mg tablet TAKE 1 TABLET BY MOUTH EVERY DAY  Indications: high blood pressure 12/1/22   Vivi Woods NP   molnupiravir 200 mg capsule Take 4 Capsules by mouth every twelve (12) hours.  Indications: COVID-19 (emergency use authorization) 9/16/22   Rukhsana Thompson NP   ergocalciferol (ERGOCALCIFEROL) 1,250 mcg (50,000 unit) capsule Take 1 Capsule by mouth every seven (7) days. Take 1 cap every week til all gone then take over the counter Vit D 4000 units daily 8/29/22   Stacie OCHOA NP   hydroCHLOROthiazide (HYDRODIURIL) 12.5 mg tablet Take 1 Tablet by mouth in the morning. Indications: high blood pressure 8/15/22   Cahill, Dorrie Olszewski L, NP   fluticasone propionate (FLONASE) 50 mcg/actuation nasal spray 2 Sprays by Both Nostrils route daily. 5/9/22   Ramonita Iyer NP   ProAir HFA 90 mcg/actuation inhaler TAKE 1 PUFF BY INHALATION EVERY SIX HOURS AS NEEDED FOR WHEEZING OR SHORTNESS OF BREATH. 7/13/21   Bharti Suresh MD   adapalene (DIFFERIN) 0.1 % topical gel APPLY TO AFFECTED AREA NIGHTLY. USE SMALL AMOUNT AS DIRECTED 1/11/21   Bridger Randle NP   clindamycin-benzoyl Peroxide (DUAC) 1.2 %(1 % base) -5 % SR topical gel Apply  to affected area nightly. 8/12/20   Alley Calle MD   ferrous sulfate (IRON) 325 mg (65 mg iron) EC tablet Take 1 Tab by mouth three (3) times daily (with meals). 6/7/19   Leigh Dunn MD        Results for orders placed or performed in visit on 01/25/23   HEPATITIS C AB   Result Value Ref Range    Hep C Virus Ab <0.1 0.0 - 0.9 s/co ratio   AMB POC HEMOGLOBIN A1C   Result Value Ref Range    Hemoglobin A1c (POC) 6.3 %      Physical Exam  Patient appears well, she is pleasant, alert, oriented x 3, in no distress. ENT normal.  Neck supple. No adenopathy or thyromegaly. LEONIE. Lungs are clear, good air entry, no wheezes  Cardiovascular, S1 and S2 normal, no murmurs, regular rate and rhythm. Chest wall negative for tenderness  Abdomen is soft without tenderness, guarding  /Anorectal, deferred. Muscleskeletal, reduce ROM of right shoulder and arm  Extremities show no edema  Neurological is normal without focal findings. Skin: no concerning lesions. Psych: normal affect. Mood good. Oriented x 3.       Vitals:    01/25/23 1014 01/25/23 1019   BP: (!) 129/91 125/87   Pulse: (!) 59    Resp: 16    Temp: 97.3 °F (36.3 °C)    TempSrc: Temporal    SpO2: 97%    Weight: 175 lb (79.4 kg)    Height: 5' 3\" (1.6 m)    LMP: 05/04/2019       *Plan of care reviewed with patient. Patient in agreement with plan and expresses understanding. All questions answered and patient encouraged to call or RTO if further questions or concerns. On this date 01/25/2023 I have spent 35 minutes reviewing previous notes, test results and face to face with the patient discussing the diagnosis and importance of compliance with the treatment plan as well as documenting on the day of the visit. DMITRY Mcconnell                This is the Subsequent Medicare Annual Wellness Exam, performed 12 months or more after the Initial AWV or the last Subsequent AWV    I have reviewed the patient's medical history in detail and updated the computerized patient record. Assessment/Plan   Education and counseling provided:  Are appropriate based on today's review and evaluation  End-of-Life planning (with patient's consent)    1. Essential hypertension  2. Prediabetes  -     AMB POC HEMOGLOBIN A1C  3. Medicare annual wellness visit, subsequent  4. Full code status  5. Screen for colon cancer  -     COLOGUARD TEST (FECAL DNA COLORECTAL CANCER SCREENING)  6. Need for hepatitis C screening test  -     HEPATITIS C AB; Future  7. Advanced directives, counseling/discussion  -     FULL CODE  8. Chronic right shoulder pain  -     lidocaine (LIDODERM) 5 %; Apply patch to the affected area for 12 hours a day and remove for 12 hours a day., Normal, Disp-15 Each, R-1  -     ibuprofen (MOTRIN) 800 mg tablet; Take 1 Tablet by mouth every eight (8) hours as needed for Pain. Indications: pain, Normal, Disp-90 Tablet, R-0  -     acetaminophen (Tylenol Arthritis Pain) 650 mg TbER; Take 1 Tablet by mouth every eight (8) hours. , Normal, Disp-90 Tablet, R-0  -     DULoxetine (Cymbalta) 30 mg capsule;  Take 1 Capsule by mouth two (2) times a day. Indications: anxiousness associated with depression, chronic muscle or bone pain, Normal, Disp-30 Capsule, R-1  9. Anxiety and depression  -     DULoxetine (Cymbalta) 30 mg capsule; Take 1 Capsule by mouth two (2) times a day. Indications: anxiousness associated with depression, chronic muscle or bone pain, Normal, Disp-30 Capsule, R-1  10. Fever blister  -     valACYclovir (VALTREX) 1 gram tablet; Take 2 Tablets by mouth daily. Take 2 tabs by mouth for one day at onset of cold sore, Normal, Disp-30 Tablet, R-1       Depression Risk Factor Screening     3 most recent PHQ Screens 1/25/2023   Little interest or pleasure in doing things Nearly every day   Feeling down, depressed, irritable, or hopeless More than half the days   Total Score PHQ 2 5       Alcohol & Drug Abuse Risk Screen    Do you average more than 1 drink per night or more than 7 drinks a week:  No    On any one occasion in the past three months have you have had more than 3 drinks containing alcohol:  No          Functional Ability and Level of Safety    Hearing: Hearing is good. Activities of Daily Living: The home contains: no safety equipment. Patient does total self care      Ambulation: with no difficulty     Fall Risk:  Fall Risk Assessment, last 12 mths 4/3/2020   Able to walk? Yes   Fall in past 12 months?  No      Abuse Screen:  Patient is not abused       Cognitive Screening    Has your family/caregiver stated any concerns about your memory: no     Cognitive Screening: no issues     Health Maintenance Due     Health Maintenance Due   Topic Date Due    COVID-19 Vaccine (1) Never done    Colorectal Cancer Screening Combo  Never done    Shingles Vaccine (1 of 2) Never done    Flu Vaccine (1) Never done       Patient Care Team   Patient Care Team:  Jenise Hughes NP as PCP - General (Nurse Practitioner)  Jenise Hughes NP as PCP - REHABILITATION [facility] Empaneled Provider    History     Patient Active Problem List Diagnosis Code    Essential hypertension I10    Menometrorrhagia N92.1    Iron deficiency anemia due to chronic blood loss D50.0    S/P hysterectomy Z90.710    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 15 Each 1    ibuprofen (MOTRIN) 800 mg tablet Take 1 Tablet by mouth every eight (8) hours as needed for Pain. Indications: pain 90 Tablet 0    acetaminophen (Tylenol Arthritis Pain) 650 mg TbER Take 1 Tablet by mouth every eight (8) hours. 90 Tablet 0    DULoxetine (Cymbalta) 30 mg capsule Take 1 Capsule by mouth two (2) times a day. Indications: anxiousness associated with depression, chronic muscle or bone pain 30 Capsule 1    valACYclovir (VALTREX) 1 gram tablet Take 2 Tablets by mouth daily. Take 2 tabs by mouth for one day at onset of cold sore 30 Tablet 1    amLODIPine (NORVASC) 5 mg tablet TAKE 1 TABLET BY MOUTH EVERY DAY  Indications: high blood pressure 90 Tablet 1    molnupiravir 200 mg capsule Take 4 Capsules by mouth every twelve (12) hours. Indications: COVID-19 (emergency use authorization) 40 Capsule 0    ergocalciferol (ERGOCALCIFEROL) 1,250 mcg (50,000 unit) capsule Take 1 Capsule by mouth every seven (7) days. Take 1 cap every week til all gone then take over the counter Vit D 4000 units daily 12 Capsule 0    hydroCHLOROthiazide (HYDRODIURIL) 12.5 mg tablet Take 1 Tablet by mouth in the morning.  Indications: high blood pressure 90 Tablet 3    fluticasone propionate (FLONASE) 50 mcg/actuation nasal spray 2 Sprays by Both Nostrils route daily. 1 Each 2    ProAir HFA 90 mcg/actuation inhaler TAKE 1 PUFF BY INHALATION EVERY SIX HOURS AS NEEDED FOR WHEEZING OR SHORTNESS OF BREATH. 8.5 Inhaler 0    adapalene (DIFFERIN) 0.1 % topical gel APPLY TO AFFECTED AREA NIGHTLY. USE SMALL AMOUNT AS DIRECTED 45 g 0    clindamycin-benzoyl Peroxide (DUAC) 1.2 %(1 % base) -5 % SR topical gel Apply  to affected area nightly. 1 Tube 0    ferrous sulfate (IRON) 325 mg (65 mg iron) EC tablet Take 1 Tab by mouth three (3) times daily (with meals).  80 Tab 3     No Known Allergies    Family History   Problem Relation Age of Onset    Breast Cancer Mother         52's    Breast Cancer Sister         52's    No Known Problems Father      Social History     Tobacco Use    Smoking status: Never    Smokeless tobacco: Never   Substance Use Topics    Alcohol use: Yes     Comment: occasional         [place] Watson, NP

## 2023-01-25 NOTE — PROGRESS NOTES
Liudmila Cagle is a 46 y.o. presents today for No chief complaint on file. Is someone accompanying this pt? No    Is the patient using any DME equipment during OV? No    There were no vitals taken for this visit. Depression Screening:   3 most recent PHQ Screens 1/25/2023   Little interest or pleasure in doing things Nearly every day   Feeling down, depressed, irritable, or hopeless More than half the days   Total Score PHQ 2 5       Health Maintenance: reviewed and discussed and ordered per Provider. Health Maintenance Due   Topic Date Due    Hepatitis C Screening  Never done    COVID-19 Vaccine (1) Never done    Colorectal Cancer Screening Combo  Never done    Shingles Vaccine (1 of 2) Never done    Flu Vaccine (1) Never done    Medicare Yearly Exam  Never done         Coordination of Care:   1. \"Have you been to the ER, urgent care clinic since your last visit? Hospitalized since your last visit? \" No    2. \"Have you seen or consulted any other health care providers outside of the [address] [place] [address] since your last visit? \" No     3. For patients aged 39-70: Has the patient had a colonoscopy / FIT/ Cologuard? No    If the patient is female:    4. For patients aged 41-77: Has the patient had a mammogram within the past 2 years? Yes - no Care Gap present    5. For patients aged 21-65: Has the patient had a pap smear? No     Advanced Directive:  1. Do you have an Advanced Directive? No     2. Would you like information on Advanced Directives?  No      Alisa Munson, [facility]

## 2023-01-25 NOTE — ACP (ADVANCE CARE PLANNING)
Advance Care Planning     General Advance Care Planning (ACP) Conversation      Date of Conversation: 1/25/2023  Conducted with: Patient with Decision Making Capacity    Healthcare Decision Maker:   No healthcare decision makers have been documented. Click here to complete [place] including selection of the Healthcare Decision Maker Relationship (ie \"Primary\")    Today we documented Decision Maker(s) consistent with Legal Next of Kin hierarchy.     Content/Action Overview:   Has NO ACP documents/care preferences - requested patient complete ACP documents  Reviewed DNR/DNI and patient elects Full Code (Attempt Resuscitation)  Topics discussed: treatment goals, ventilation preferences, and resuscitation preferences  Additional Comments : yes to CPR and her daughter and twin sister will make decisions about life support       Length of Voluntary ACP Conversation in minutes:  30 minutes    Anthony Lucas NP

## 2023-01-26 LAB — HCV AB S/CO SERPL IA: <0.1 S/CO RATIO (ref 0–0.9)

## 2023-02-03 ENCOUNTER — VIRTUAL VISIT (OUTPATIENT)
Dept: FAMILY MEDICINE CLINIC | Age: 53
End: 2023-02-03
Payer: MEDICARE

## 2023-02-03 DIAGNOSIS — J01.01 ACUTE RECURRENT MAXILLARY SINUSITIS: Primary | ICD-10-CM

## 2023-02-03 DIAGNOSIS — I10 ESSENTIAL HYPERTENSION: ICD-10-CM

## 2023-02-03 RX ORDER — AMOXICILLIN AND CLAVULANATE POTASSIUM 875; 125 MG/1; MG/1
1 TABLET, FILM COATED ORAL 2 TIMES DAILY
Qty: 20 TABLET | Refills: 0 | Status: SHIPPED | OUTPATIENT
Start: 2023-02-03 | End: 2023-02-13

## 2023-02-03 NOTE — PROGRESS NOTES
Morgan Verde is a 46 y.o. female  established patient, here for evaluation of the following chief complaint(s):  No chief complaint on file. Assessment and Plan  1. Acute recurrent maxillary sinusitis  -     amoxicillin-clavulanate (Augmentin) 875-125 mg per tablet; Take 1 Tablet by mouth two (2) times a day for 10 days. , Normal, Disp-20 Tablet, R-0  -     REFERRAL TO ENT-OTOLARYNGOLOGY  2. Essential hypertension       HPI:   Virtual visit. Since yesterday she has body aches, throat, left ear pressure, sinus pressure and fever. She is coughing up dark yellow sputum she thinks is coming for her sinus. Patient may go to urgent care to be tested for flu and strep. Patient admits she gets recurrent sinus infections. She has agreed to see ENT. She has tested negative at home for covid. ROS:    General: negative for - chills, fever, weight changes or malaise  HEENT: see HPI  Respiratory: no cough, shortness of breath, or wheezing  Cardiovascular: no chest pain, palpitations, or dyspnea on exertion  Gastrointestinal: no abdominal pain, N/V, change in bowel habits  Musculoskeletal: no back pain or joint pain  Neurological: no headache or dizziness  Endo:  No polyuria or polydipsia  : no urinary  Psychological: negative for - anxiety, depression, sleeps issues    Prior to Admission medications    Medication Sig Start Date End Date Taking? Authorizing Provider   amoxicillin-clavulanate (Augmentin) 875-125 mg per tablet Take 1 Tablet by mouth two (2) times a day for 10 days. 2/3/23 2/13/23 Yes Vivi Woods NP   lidocaine (LIDODERM) 5 % Apply patch to the affected area for 12 hours a day and remove for 12 hours a day. 1/25/23   Maritza Khanna NP   ibuprofen (MOTRIN) 800 mg tablet Take 1 Tablet by mouth every eight (8) hours as needed for Pain. Indications: pain 1/25/23   Maritza Khanna NP   acetaminophen (Tylenol Arthritis Pain) 650 mg TbER Take 1 Tablet by mouth every eight (8) hours. 1/25/23   Dashawn Pace NP   DULoxetine (Cymbalta) 30 mg capsule Take 1 Capsule by mouth two (2) times a day. Indications: anxiousness associated with depression, chronic muscle or bone pain 1/25/23   Dashawn Pace NP   valACYclovir (VALTREX) 1 gram tablet Take 2 Tablets by mouth daily. Take 2 tabs by mouth for one day at onset of cold sore 1/25/23   Mary OCHOA NP   amLODIPine (NORVASC) 5 mg tablet TAKE 1 TABLET BY MOUTH EVERY DAY  Indications: high blood pressure 12/1/22   Vivi Woods NP   molnupiravir 200 mg capsule Take 4 Capsules by mouth every twelve (12) hours. Indications: COVID-19 (emergency use authorization) 9/16/22   Dashawn Pace NP   hydroCHLOROthiazide (HYDRODIURIL) 12.5 mg tablet Take 1 Tablet by mouth in the morning. Indications: high blood pressure 8/15/22   Laura Woods NP   fluticasone propionate (FLONASE) 50 mcg/actuation nasal spray 2 Sprays by Both Nostrils route daily. 5/9/22   Dashawn Pace NP   ProAir HFA 90 mcg/actuation inhaler TAKE 1 PUFF BY INHALATION EVERY SIX HOURS AS NEEDED FOR WHEEZING OR SHORTNESS OF BREATH. 7/13/21   Marla Elizondo MD   adapalene (DIFFERIN) 0.1 % topical gel APPLY TO AFFECTED AREA NIGHTLY. USE SMALL AMOUNT AS DIRECTED 1/11/21   Alpesh Dickerson NP   clindamycin-benzoyl Peroxide (DUAC) 1.2 %(1 % base) -5 % SR topical gel Apply  to affected area nightly. 8/12/20   Froy Calle MD   ferrous sulfate (IRON) 325 mg (65 mg iron) EC tablet Take 1 Tab by mouth three (3) times daily (with meals). 6/7/19   Danny Arevalo MD        Results for orders placed or performed in visit on 01/25/23   HEPATITIS C AB   Result Value Ref Range    Hep C Virus Ab <0.1 0.0 - 0.9 s/co ratio   AMB POC HEMOGLOBIN A1C   Result Value Ref Range    Hemoglobin A1c (POC) 6.3 %      Physical Exam  The patient appears slightly ill, is pleasant, alert, oriented x 3, in no distress. Eyes, no discharge. Normal nose.     Head, normocephalic and atraumatic. Nose appears normal.  Neck, no visible neck mass. Lungs, appears to have normal respiratory effort     Skin, no jaundice and patient does not appear pale. Psych: normal affect. Mood good. Oriented x 3. Judgement and insight intact. There were no vitals filed for this visit. *Plan of care reviewed with patient. Patient in agreement with plan and expresses understanding. All questions answered and patient encouraged to call or RTO if further questions or concerns. On this date 02/03/2023 I have spent 20 minutes reviewing previous notes, test results and face to face with the patient discussing the diagnosis and importance of compliance with the treatment plan as well as documenting on the day of the visit.       Anna Veloz, LEYDA-C

## 2023-02-14 DIAGNOSIS — R52 PAIN, UNSPECIFIED: Primary | ICD-10-CM

## 2023-02-14 DIAGNOSIS — M25.511 CHRONIC RIGHT SHOULDER PAIN: ICD-10-CM

## 2023-02-14 DIAGNOSIS — F41.9 ANXIETY AND DEPRESSION: ICD-10-CM

## 2023-02-14 DIAGNOSIS — F32.A ANXIETY AND DEPRESSION: ICD-10-CM

## 2023-02-14 DIAGNOSIS — G89.29 CHRONIC RIGHT SHOULDER PAIN: ICD-10-CM

## 2023-02-14 NOTE — TELEPHONE ENCOUNTER
This patient contacted the office for the following prescriptions to be refilled:    Medication requested :   Requested Prescriptions     Pending Prescriptions Disp Refills    DULoxetine HCl 30 MG CSDR 30 capsule      Sig: Take 30 mg by mouth 2 times daily      PCP: CHRISTINE Sher CNP  LOV: [unfilled]  NOV DMA: Visit date not found  FUTURE APPT: No future appointments. Thank you.

## 2023-04-03 NOTE — Clinical Note
Gave her a tdap today Narcotic Refill Request    Medication(s) requested:  Oxy 15mg   Person Requesting Refill:Jennifer  What pain is the medication treating: sickle Cell pain crisis  How is the medication being taken?:takes 1 tab 6 times daily   Does pt have enough for today? Will be out today   Is pain being adequately controlled on the current regimen?: yes   Experiencing any side effects from medication?: none    Date of most recent appointment:  3/27/23  Any No Show Visits:no  Next appointment:   4/14/23  Last fill date and by whom:  3/27/23 Patricia Mantilla    Reviewed: No access     Routed provider: Patricia Mantilla

## 2023-05-04 ENCOUNTER — TELEMEDICINE (OUTPATIENT)
Facility: CLINIC | Age: 53
End: 2023-05-04
Payer: MEDICAID

## 2023-05-04 DIAGNOSIS — J06.9 UPPER RESPIRATORY TRACT INFECTION, UNSPECIFIED TYPE: ICD-10-CM

## 2023-05-04 DIAGNOSIS — H93.8X3 EAR PRESSURE, BILATERAL: ICD-10-CM

## 2023-05-04 DIAGNOSIS — J02.9 SORE THROAT: Primary | ICD-10-CM

## 2023-05-04 DIAGNOSIS — R09.81 NASAL CONGESTION: ICD-10-CM

## 2023-05-04 PROCEDURE — 99213 OFFICE O/P EST LOW 20 MIN: CPT | Performed by: NURSE PRACTITIONER

## 2023-05-17 ENCOUNTER — TELEPHONE (OUTPATIENT)
Facility: CLINIC | Age: 53
End: 2023-05-17

## 2023-05-17 NOTE — TELEPHONE ENCOUNTER
Pt states over the weekend her daughter and granddaughter were attacked by a pit pull. They both had to have surgery, but are home now. However, her granddaughter has to have another surgery. Pt states she cannot sleep and keeps reliving the incident. She also has anxiety and would like to know if Adriel Beasley can call in a Rx for her? Also, pt would like to speak with Adriel Beasley. Please call.

## 2023-05-18 ENCOUNTER — TELEMEDICINE (OUTPATIENT)
Facility: CLINIC | Age: 53
End: 2023-05-18
Payer: MEDICAID

## 2023-05-18 DIAGNOSIS — F32.A ANXIETY AND DEPRESSION: Primary | ICD-10-CM

## 2023-05-18 DIAGNOSIS — F43.10 PTSD (POST-TRAUMATIC STRESS DISORDER): ICD-10-CM

## 2023-05-18 DIAGNOSIS — Z63.79 STRESSFUL LIFE EVENT AFFECTING FAMILY: ICD-10-CM

## 2023-05-18 DIAGNOSIS — F41.9 ANXIETY AND DEPRESSION: Primary | ICD-10-CM

## 2023-05-18 PROCEDURE — 99213 OFFICE O/P EST LOW 20 MIN: CPT | Performed by: NURSE PRACTITIONER

## 2023-05-18 RX ORDER — ALPRAZOLAM 0.5 MG/1
0.5 TABLET ORAL NIGHTLY PRN
Qty: 30 TABLET | Refills: 0 | Status: SHIPPED | OUTPATIENT
Start: 2023-05-18 | End: 2023-06-17

## 2023-05-18 NOTE — PROGRESS NOTES
Rachel Vaughn is a 46 y.o. female  established patient, here for evaluation of the following chief complaint(s):  No chief complaint on file. Rachel Vaughn, was evaluated through a synchronous (real-time) audio-video encounter. The patient (or guardian if applicable) is aware that this is a billable service, which includes applicable co-pays. This Virtual Visit was conducted with patient's (and/or legal guardian's) consent. Patient identification was verified, and a caregiver was present when appropriate. The patient was located at Home: [address], [address]  [place]  Provider was located at Other: work    --CHRISTINE Pinto - CNP on 5/18/2023 at 8:21 AM    An [employer] Platform was used to authenticate this note. Assessment and Plan  1. Anxiety and depression  -     ALPRAZolam (XANAX) 0.5 MG tablet; Take 1 tablet by mouth nightly as needed for Sleep or Anxiety for up to 30 days. Max Daily Amount: 0.5 mg, Disp-30 tablet, R-0Normal  2. Stressful life event affecting family  -     ALPRAZolam (XANAX) 0.5 MG tablet; Take 1 tablet by mouth nightly as needed for Sleep or Anxiety for up to 30 days. Max Daily Amount: 0.5 mg, Disp-30 tablet, R-0Normal  3. PTSD (post-traumatic stress disorder)  -     ALPRAZolam (XANAX) 0.5 MG tablet; Take 1 tablet by mouth nightly as needed for Sleep or Anxiety for up to 30 days. Max Daily Amount: 0.5 mg, Disp-30 tablet, R-0Normal       Return in about 1 week (around 5/25/2023), or if symptoms worsen or fail to improve, for anxiety, 20. HPI:   Virtual visit. Pt appears well. Patient reports her daughter and granddaughter was attacked by a dog Saturday, 5/13/2023. She keeps playing the dog attack over in her head and cannot.     ROS:    General: negative for - chills, fever, weight changes or malaise  HEENT: no sore throat, nasal congestion, vision problems or ear problems  Respiratory: no cough, shortness of breath, or wheezing  Cardiovascular: no

## 2023-05-25 DIAGNOSIS — I10 ESSENTIAL HYPERTENSION: Primary | ICD-10-CM

## 2023-05-28 RX ORDER — AMLODIPINE BESYLATE 5 MG/1
TABLET ORAL
Qty: 90 TABLET | Refills: 0 | Status: SHIPPED | OUTPATIENT
Start: 2023-05-28 | End: 2023-07-28 | Stop reason: SDUPTHER

## 2023-06-15 ENCOUNTER — TELEPHONE (OUTPATIENT)
Facility: CLINIC | Age: 53
End: 2023-06-15

## 2023-06-29 ENCOUNTER — TELEPHONE (OUTPATIENT)
Facility: CLINIC | Age: 53
End: 2023-06-29

## 2023-06-29 DIAGNOSIS — Z12.31 ENCOUNTER FOR SCREENING MAMMOGRAM FOR MALIGNANT NEOPLASM OF BREAST: Primary | ICD-10-CM

## 2023-06-30 ENCOUNTER — TELEPHONE (OUTPATIENT)
Facility: CLINIC | Age: 53
End: 2023-06-30

## 2023-07-12 DIAGNOSIS — R52 PAIN, UNSPECIFIED: Primary | ICD-10-CM

## 2023-07-12 RX ORDER — SENNOSIDES 8.6 MG
CAPSULE ORAL
COMMUNITY
Start: 2023-05-18 | End: 2023-07-15 | Stop reason: SDUPTHER

## 2023-07-12 NOTE — TELEPHONE ENCOUNTER
This patient contacted the office for the following prescriptions to be refilled:    Medication requested :   Requested Prescriptions     Pending Prescriptions Disp Refills    acetaminophen (TYLENOL) 650 MG extended release tablet 90 tablet       PCP: CHRISTINE Miguel CNP  LOV:           06/16/23 a VIRTUAL  NOV DMA: 7/27/2023  FUTURE APPT:   Future Appointments   Date Time Provider [address] [place] [address]   7/27/2023  8:40 AM CHRISTINE Miguel CNP DMA BS AMB         Thank you.

## 2023-07-15 RX ORDER — SENNOSIDES 8.6 MG
650 CAPSULE ORAL EVERY 8 HOURS PRN
Qty: 90 TABLET | Refills: 3 | Status: SHIPPED | OUTPATIENT
Start: 2023-07-15

## 2023-07-26 DIAGNOSIS — I10 ESSENTIAL HYPERTENSION: ICD-10-CM

## 2023-07-26 NOTE — TELEPHONE ENCOUNTER
----- Message from Rod Galicia sent at 7/26/2023 11:49 AM EDT -----  Subject: Refill Request    QUESTIONS  Name of Medication? amLODIPine (NORVASC) 5 MG tablet  Patient-reported dosage and instructions? 1 DAILY  How many days do you have left? 25  Preferred Pharmacy? [id number]  Pharmacy phone number (if available)? [phone]  Additional Information for Provider? Patient is losing her insurance this   month and needs refills so she won't run out.  ---------------------------------------------------------------------------  --------------,  Name of Medication? hydroCHLOROthiazide (HYDRODIURIL) 12.5 MG tablet  Patient-reported dosage and instructions? 1 daily   How many days do you have left? 20  Preferred Pharmacy? [id number]  Pharmacy phone number (if available)? [phone]  ---------------------------------------------------------------------------  --------------  CALL BACK INFO  What is the best way for the office to contact you? OK to leave message on   voicemail  Preferred Call Back Phone Number? [phone]  ---------------------------------------------------------------------------  --------------  SCRIPT ANSWERS  Relationship to Patient?  Self

## 2023-07-28 RX ORDER — AMLODIPINE BESYLATE 5 MG/1
TABLET ORAL
Qty: 90 TABLET | Refills: 1 | Status: SHIPPED | OUTPATIENT
Start: 2023-07-28

## 2023-07-28 RX ORDER — HYDROCHLOROTHIAZIDE 12.5 MG/1
12.5 TABLET ORAL DAILY
Qty: 90 TABLET | Refills: 1 | Status: SHIPPED | OUTPATIENT
Start: 2023-07-28

## 2023-07-31 DIAGNOSIS — Z86.19 H/O COLD SORES: Primary | ICD-10-CM

## 2023-07-31 NOTE — TELEPHONE ENCOUNTER
This patient contacted the office for the following prescriptions to be refilled:    Medication requested :   Requested Prescriptions     Pending Prescriptions Disp Refills    valACYclovir (VALTREX) 1 g tablet 90 tablet 1     Sig: Take 0.5 tablets by mouth daily      PCP: CHRISTINE Eaton CNP  LOV:           06/2023 a VIRTUAL  NOV DMA: 9/25/2023  FUTURE APPT:   Future Appointments   Date Time Provider [address] [place] [address]   9/25/2023 10:20 AM CHRISTINE Eaton CNP, DMA [facility]         Thank you.

## 2023-08-01 RX ORDER — VALACYCLOVIR HYDROCHLORIDE 500 MG/1
TABLET, FILM COATED ORAL
Qty: 90 TABLET | Refills: 1 | Status: SHIPPED | OUTPATIENT
Start: 2023-08-01 | End: 2023-08-03 | Stop reason: SDUPTHER

## 2023-08-02 DIAGNOSIS — Z86.19 H/O COLD SORES: ICD-10-CM

## 2023-08-03 RX ORDER — VALACYCLOVIR HYDROCHLORIDE 500 MG/1
500 TABLET, FILM COATED ORAL DAILY
Qty: 90 TABLET | Refills: 1 | Status: SHIPPED | OUTPATIENT
Start: 2023-08-03

## 2024-03-12 DIAGNOSIS — I10 ESSENTIAL HYPERTENSION: ICD-10-CM

## 2024-03-12 NOTE — TELEPHONE ENCOUNTER
Patient called requesting a refill foe the following medications. She also states there has been a lapse of her insurance and she will schedule and appointment once her insurance is active.             Please assist, Thank you.

## 2024-03-13 RX ORDER — AMLODIPINE BESYLATE 5 MG/1
TABLET ORAL
Qty: 30 TABLET | Refills: 0 | Status: SHIPPED | OUTPATIENT
Start: 2024-03-13

## 2024-03-13 RX ORDER — HYDROCHLOROTHIAZIDE 12.5 MG/1
12.5 TABLET ORAL DAILY
Qty: 30 TABLET | Refills: 0 | Status: SHIPPED | OUTPATIENT
Start: 2024-03-13

## 2024-04-22 SDOH — ECONOMIC STABILITY: TRANSPORTATION INSECURITY
IN THE PAST 12 MONTHS, HAS LACK OF TRANSPORTATION KEPT YOU FROM MEETINGS, WORK, OR FROM GETTING THINGS NEEDED FOR DAILY LIVING?: PATIENT DECLINED

## 2024-04-22 SDOH — ECONOMIC STABILITY: HOUSING INSECURITY
IN THE LAST 12 MONTHS, WAS THERE A TIME WHEN YOU DID NOT HAVE A STEADY PLACE TO SLEEP OR SLEPT IN A SHELTER (INCLUDING NOW)?: PATIENT DECLINED

## 2024-04-22 SDOH — ECONOMIC STABILITY: INCOME INSECURITY: HOW HARD IS IT FOR YOU TO PAY FOR THE VERY BASICS LIKE FOOD, HOUSING, MEDICAL CARE, AND HEATING?: PATIENT DECLINED

## 2024-04-22 SDOH — ECONOMIC STABILITY: FOOD INSECURITY: WITHIN THE PAST 12 MONTHS, YOU WORRIED THAT YOUR FOOD WOULD RUN OUT BEFORE YOU GOT MONEY TO BUY MORE.: PATIENT DECLINED

## 2024-04-25 ENCOUNTER — OFFICE VISIT (OUTPATIENT)
Facility: CLINIC | Age: 54
End: 2024-04-25

## 2024-04-25 VITALS
TEMPERATURE: 97.6 F | HEART RATE: 55 BPM | RESPIRATION RATE: 18 BRPM | WEIGHT: 180 LBS | HEIGHT: 63 IN | OXYGEN SATURATION: 98 % | BODY MASS INDEX: 31.89 KG/M2 | SYSTOLIC BLOOD PRESSURE: 138 MMHG | DIASTOLIC BLOOD PRESSURE: 83 MMHG

## 2024-04-25 DIAGNOSIS — F32.A ANXIETY AND DEPRESSION: ICD-10-CM

## 2024-04-25 DIAGNOSIS — F41.9 ANXIETY AND DEPRESSION: ICD-10-CM

## 2024-04-25 DIAGNOSIS — Z71.89 ACP (ADVANCE CARE PLANNING): ICD-10-CM

## 2024-04-25 DIAGNOSIS — I10 ESSENTIAL HYPERTENSION: ICD-10-CM

## 2024-04-25 DIAGNOSIS — Z13.220 SCREENING FOR CHOLESTEROL LEVEL: ICD-10-CM

## 2024-04-25 DIAGNOSIS — K43.2 INCISIONAL HERNIA, WITHOUT OBSTRUCTION OR GANGRENE: ICD-10-CM

## 2024-04-25 DIAGNOSIS — Z13.89 SCREENING FOR BLOOD OR PROTEIN IN URINE: ICD-10-CM

## 2024-04-25 DIAGNOSIS — Z13.228 SCREENING FOR METABOLIC DISORDER: ICD-10-CM

## 2024-04-25 DIAGNOSIS — Z13.0 SCREENING FOR DEFICIENCY ANEMIA: ICD-10-CM

## 2024-04-25 DIAGNOSIS — Z13.1 SCREENING FOR DIABETES MELLITUS (DM): Primary | ICD-10-CM

## 2024-04-25 DIAGNOSIS — R10.33 PERIUMBILICAL ABDOMINAL PAIN: ICD-10-CM

## 2024-04-25 DIAGNOSIS — Z13.29 SCREENING FOR THYROID DISORDER: ICD-10-CM

## 2024-04-25 DIAGNOSIS — R73.03 PREDIABETES: ICD-10-CM

## 2024-04-25 PROCEDURE — 99396 PREV VISIT EST AGE 40-64: CPT | Performed by: NURSE PRACTITIONER

## 2024-04-25 PROCEDURE — 3075F SYST BP GE 130 - 139MM HG: CPT | Performed by: NURSE PRACTITIONER

## 2024-04-25 PROCEDURE — 3079F DIAST BP 80-89 MM HG: CPT | Performed by: NURSE PRACTITIONER

## 2024-04-25 RX ORDER — HYDROCHLOROTHIAZIDE 12.5 MG/1
12.5 TABLET ORAL DAILY
Qty: 30 TABLET | Refills: 0 | Status: SHIPPED | OUTPATIENT
Start: 2024-04-25

## 2024-04-25 RX ORDER — AMLODIPINE BESYLATE 5 MG/1
TABLET ORAL
Qty: 30 TABLET | Refills: 0 | Status: SHIPPED | OUTPATIENT
Start: 2024-04-25

## 2024-04-25 SDOH — ECONOMIC STABILITY: HOUSING INSECURITY
IN THE LAST 12 MONTHS, WAS THERE A TIME WHEN YOU DID NOT HAVE A STEADY PLACE TO SLEEP OR SLEPT IN A SHELTER (INCLUDING NOW)?: NO

## 2024-04-25 SDOH — ECONOMIC STABILITY: FOOD INSECURITY: WITHIN THE PAST 12 MONTHS, YOU WORRIED THAT YOUR FOOD WOULD RUN OUT BEFORE YOU GOT MONEY TO BUY MORE.: NEVER TRUE

## 2024-04-25 SDOH — ECONOMIC STABILITY: INCOME INSECURITY: HOW HARD IS IT FOR YOU TO PAY FOR THE VERY BASICS LIKE FOOD, HOUSING, MEDICAL CARE, AND HEATING?: NOT VERY HARD

## 2024-04-25 SDOH — ECONOMIC STABILITY: FOOD INSECURITY: WITHIN THE PAST 12 MONTHS, THE FOOD YOU BOUGHT JUST DIDN'T LAST AND YOU DIDN'T HAVE MONEY TO GET MORE.: NEVER TRUE

## 2024-04-25 ASSESSMENT — PATIENT HEALTH QUESTIONNAIRE - PHQ9
7. TROUBLE CONCENTRATING ON THINGS, SUCH AS READING THE NEWSPAPER OR WATCHING TELEVISION: NOT AT ALL
SUM OF ALL RESPONSES TO PHQ QUESTIONS 1-9: 7
SUM OF ALL RESPONSES TO PHQ QUESTIONS 1-9: 7
9. THOUGHTS THAT YOU WOULD BE BETTER OFF DEAD, OR OF HURTING YOURSELF: NOT AT ALL
4. FEELING TIRED OR HAVING LITTLE ENERGY: NEARLY EVERY DAY
2. FEELING DOWN, DEPRESSED OR HOPELESS: MORE THAN HALF THE DAYS
3. TROUBLE FALLING OR STAYING ASLEEP: MORE THAN HALF THE DAYS
1. LITTLE INTEREST OR PLEASURE IN DOING THINGS: NOT AT ALL
SUM OF ALL RESPONSES TO PHQ QUESTIONS 1-9: 7
8. MOVING OR SPEAKING SO SLOWLY THAT OTHER PEOPLE COULD HAVE NOTICED. OR THE OPPOSITE, BEING SO FIGETY OR RESTLESS THAT YOU HAVE BEEN MOVING AROUND A LOT MORE THAN USUAL: NOT AT ALL
6. FEELING BAD ABOUT YOURSELF - OR THAT YOU ARE A FAILURE OR HAVE LET YOURSELF OR YOUR FAMILY DOWN: NOT AT ALL
SUM OF ALL RESPONSES TO PHQ9 QUESTIONS 1 & 2: 2
5. POOR APPETITE OR OVEREATING: NOT AT ALL
SUM OF ALL RESPONSES TO PHQ QUESTIONS 1-9: 7

## 2024-04-25 NOTE — ACP (ADVANCE CARE PLANNING)
Advance Care Planning     General Advance Care Planning (ACP) Conversation    Date of Conversation: 4/25/2024  Conducted with: Patient with Decision Making Capacity  Other persons present: Sister see record  Daughter see record     Healthcare Decision Maker:    Primary Decision Maker: Kaylee Rashid - J[place] - [phone]    Secondary Decision Maker: Nidhi Gant - [phone]  Click here to complete Healthcare Decision Makers including selection of the Healthcare Decision Maker Relationship (ie \"Primary\").  Today we documented Decision Maker(s) consistent with Legal Next of Kin hierarchy.    Content/Action Overview:  Has ACP document(s) NOT on file - requested patient to provide  Reviewed DNR/DNI and patient elects Full Code (Attempt Resuscitation)  treatment goals, artificial nutrition, ventilation preferences, and resuscitation preferences  Yes to resuscitation and family will make decisions about life support.       Length of Voluntary ACP Conversation in minutes:  <16 minutes (Non-Billable)    KRAIG PLASCENCIA, CHRISTINE - CNP

## 2024-04-25 NOTE — PROGRESS NOTES
Arlene Boone is a 53 y.o. female  established patient, here for evaluation of the following chief complaint(s):  Chief Complaint   Patient presents with    Follow-up     Abdomen pain      Assessment and Plan  1. Screening for diabetes mellitus (DM)  -     Hemoglobin A1C; Future  2. Essential hypertension  -     amLODIPine (NORVASC) 5 MG tablet; TAKE 1 TABLET BY MOUTH EVERY DAY  Indications: high blood pressure, Disp-30 tablet, R-0Normal  -     hydroCHLOROthiazide 12.5 MG tablet; Take 1 tablet by mouth daily, Disp-30 tablet, R-0Normal  3. Screening for blood or protein in urine  -     Urinalysis with Microscopic; Future  4. Screening for metabolic disorder  -     Comprehensive Metabolic Panel; Future  5. Screening for cholesterol level  -     Lipid Panel; Future  6. Screening for thyroid disorder  -     TSH + Free T4 Panel; Future  7. Screening for deficiency anemia  -     CBC with Auto Differential; Future  8. Prediabetes  Comments:  A1c today  9. Anxiety and depression  Comments:  Stable on no medications  10. ACP (advance care planning)  -     FULL CODE  11. Incisional hernia, without obstruction or gangrene  -     [facility] - Yudi Berg MD, General Surgery, [place]  12. Periumbilical abdominal pain       Return in about 1 month (around 5/25/2024) for VV first or last appt of day in 1 mos for resutls, 15.   HPI:   In office visit. Pt works at [employer]     Hypertension  Taking medications as directed amlodipine 5 mg, HCTZ 12.5 mg  Patient denies headaches, blurry vision, dizziness, chest pain, shortness of breath or palpitations patient tries to follow low-salt diet.  Monitors BP at home.  Exercising.     Pt has a likely abdomen hernia related to a 2019 hysterectomy. She has pain w/ this hernia when lifting and at times \"lump\" of her abdomen. She hurts so bad sometimes she cannot work. See picture below.  Unremarkable abdomen exam.  Patient cannot reproduce this abdomen lump at this time.  Referred to general 
Provider.    Health Maintenance Due   Topic Date Due    Hepatitis B vaccine (1 of 3 - 3-dose series) Never done    COVID-19 Vaccine (1) Never done    HIV screen  Never done    Colorectal Cancer Screen  Never done    Shingles vaccine (1 of 2) Never done    Breast cancer screen  07/15/2023    A1C test (Diabetic or Prediabetic)  01/25/2024    Depression Monitoring  02/03/2024        -HENRY Barcenas  [facility]  Phone: [phone]  Fax: [phone]

## 2024-04-27 LAB
ALBUMIN SERPL-MCNC: 4.1 G/DL (ref 3.8–4.9)
ALBUMIN/GLOB SERPL: 1.6 {RATIO} (ref 1.2–2.2)
ALP SERPL-CCNC: 93 IU/L (ref 44–121)
ALT SERPL-CCNC: 18 IU/L (ref 0–32)
APPEARANCE UR: CLEAR
AST SERPL-CCNC: 19 IU/L (ref 0–40)
BACTERIA #/AREA URNS HPF: NORMAL /[HPF]
BASOPHILS # BLD AUTO: 0.1 X10E3/UL (ref 0–0.2)
BASOPHILS NFR BLD AUTO: 2 %
BILIRUB SERPL-MCNC: 0.3 MG/DL (ref 0–1.2)
BILIRUB UR QL STRIP: NEGATIVE
BUN SERPL-MCNC: 9 MG/DL (ref 6–24)
BUN/CREAT SERPL: 13 (ref 9–23)
CALCIUM SERPL-MCNC: 9.5 MG/DL (ref 8.7–10.2)
CASTS URNS QL MICRO: NORMAL /LPF
CHLORIDE SERPL-SCNC: 106 MMOL/L (ref 96–106)
CHOLEST SERPL-MCNC: 195 MG/DL (ref 100–199)
CO2 SERPL-SCNC: 23 MMOL/L (ref 20–29)
COLOR UR: YELLOW
CREAT SERPL-MCNC: 0.68 MG/DL (ref 0.57–1)
EGFRCR SERPLBLD CKD-EPI 2021: 104 ML/MIN/1.73
EOSINOPHIL # BLD AUTO: 0.2 X10E3/UL (ref 0–0.4)
EOSINOPHIL NFR BLD AUTO: 3 %
EPI CELLS #/AREA URNS HPF: NORMAL /HPF (ref 0–10)
ERYTHROCYTE [DISTWIDTH] IN BLOOD BY AUTOMATED COUNT: 14.1 % (ref 11.7–15.4)
GLOBULIN SER CALC-MCNC: 2.6 G/DL (ref 1.5–4.5)
GLUCOSE SERPL-MCNC: 136 MG/DL (ref 70–99)
GLUCOSE UR QL STRIP: NEGATIVE
HBA1C MFR BLD: 6.4 % (ref 4.8–5.6)
HCT VFR BLD AUTO: 37.3 % (ref 34–46.6)
HDLC SERPL-MCNC: 55 MG/DL
HGB BLD-MCNC: 12.4 G/DL (ref 11.1–15.9)
HGB UR QL STRIP: NEGATIVE
IMM GRANULOCYTES # BLD AUTO: 0 X10E3/UL (ref 0–0.1)
IMM GRANULOCYTES NFR BLD AUTO: 0 %
KETONES UR QL STRIP: NEGATIVE
LDLC SERPL CALC-MCNC: 129 MG/DL (ref 0–99)
LEUKOCYTE ESTERASE UR QL STRIP: NEGATIVE
LYMPHOCYTES # BLD AUTO: 2 X10E3/UL (ref 0.7–3.1)
LYMPHOCYTES NFR BLD AUTO: 35 %
MCH RBC QN AUTO: 29.5 PG (ref 26.6–33)
MCHC RBC AUTO-ENTMCNC: 33.2 G/DL (ref 31.5–35.7)
MCV RBC AUTO: 89 FL (ref 79–97)
MICRO URNS: NORMAL
MICRO URNS: NORMAL
MONOCYTES # BLD AUTO: 0.5 X10E3/UL (ref 0.1–0.9)
MONOCYTES NFR BLD AUTO: 9 %
NEUTROPHILS # BLD AUTO: 3 X10E3/UL (ref 1.4–7)
NEUTROPHILS NFR BLD AUTO: 51 %
NITRITE UR QL STRIP: NEGATIVE
PH UR STRIP: 6 [PH] (ref 5–7.5)
PLATELET # BLD AUTO: 207 X10E3/UL (ref 150–450)
POTASSIUM SERPL-SCNC: 4 MMOL/L (ref 3.5–5.2)
PROT SERPL-MCNC: 6.7 G/DL (ref 6–8.5)
PROT UR QL STRIP: NEGATIVE
RBC # BLD AUTO: 4.21 X10E6/UL (ref 3.77–5.28)
RBC #/AREA URNS HPF: NORMAL /HPF (ref 0–2)
SODIUM SERPL-SCNC: 144 MMOL/L (ref 134–144)
SP GR UR STRIP: 1.02 (ref 1–1.03)
T4 FREE SERPL-MCNC: 1.36 NG/DL (ref 0.82–1.77)
TRIGL SERPL-MCNC: 61 MG/DL (ref 0–149)
TSH SERPL DL<=0.005 MIU/L-ACNC: 0.38 UIU/ML (ref 0.45–4.5)
UROBILINOGEN UR STRIP-MCNC: 0.2 MG/DL (ref 0.2–1)
VLDLC SERPL CALC-MCNC: 11 MG/DL (ref 5–40)
WBC # BLD AUTO: 5.8 X10E3/UL (ref 3.4–10.8)
WBC #/AREA URNS HPF: NORMAL /HPF (ref 0–5)

## 2024-05-06 ENCOUNTER — TELEPHONE (OUTPATIENT)
Facility: CLINIC | Age: 54
End: 2024-05-06

## 2024-05-06 NOTE — TELEPHONE ENCOUNTER
Patient called stating she was seen by Nancy on 4/25/24 and given a work note for \"Light Duty\".  Patient's employer is now requesting her to complete [facility] paperwork.    Patient will be dropping off the paperwork for Nancy to complete upon her return to the office, and would like the paperwork faxed back to her employer.  She states the paperwork is due back on 5/13/24 or 5/14/24.    Please assist.  Thank you.

## 2024-05-07 ENCOUNTER — OFFICE VISIT (OUTPATIENT)
Age: 54
End: 2024-05-07
Payer: SELF-PAY

## 2024-05-07 VITALS
BODY MASS INDEX: 31.36 KG/M2 | HEART RATE: 63 BPM | HEIGHT: 63 IN | WEIGHT: 177 LBS | SYSTOLIC BLOOD PRESSURE: 108 MMHG | TEMPERATURE: 98 F | DIASTOLIC BLOOD PRESSURE: 78 MMHG | OXYGEN SATURATION: 98 %

## 2024-05-07 DIAGNOSIS — K42.9 UMBILICAL HERNIA WITHOUT OBSTRUCTION AND WITHOUT GANGRENE: ICD-10-CM

## 2024-05-07 DIAGNOSIS — R10.33 PERIUMBILICAL ABDOMINAL PAIN: Primary | ICD-10-CM

## 2024-05-07 DIAGNOSIS — I10 ESSENTIAL HYPERTENSION: ICD-10-CM

## 2024-05-07 DIAGNOSIS — Z90.710 S/P HYSTERECTOMY: ICD-10-CM

## 2024-05-07 PROCEDURE — 3078F DIAST BP <80 MM HG: CPT | Performed by: SURGERY

## 2024-05-07 PROCEDURE — 3074F SYST BP LT 130 MM HG: CPT | Performed by: SURGERY

## 2024-05-07 PROCEDURE — 99204 OFFICE O/P NEW MOD 45 MIN: CPT | Performed by: SURGERY

## 2024-05-07 NOTE — PATIENT INSTRUCTIONS
If you have any questions or concerns about today's appointment, the verbal and/or written instructions you were given for follow up care, please call our office at [phone].    [facility] - [address], [address]  [place] [address] [phone] office  [phone] fax     You may contact Patricia Berg surgery scheduler at [phone].

## 2024-05-07 NOTE — PROGRESS NOTES
Arlene Boone is a 53 y.o. female (: 1970) presenting to address:    Chief Complaint   Patient presents with    New Patient     Bulge above naval/referred by Nancy Mo NP       Medication list and allergies have been reviewed with Arlene Boone and updated as of today's date.     I have gone over all Medical, Surgical and Social History with Arlene Paolo and updated/added the information accordingly.

## 2024-05-07 NOTE — TELEPHONE ENCOUNTER
Pt came to the office this morning to let Nancy know BS Surgical office completed her FMLA paperwork.  Also, she was informed that she has 2 hernias.    Please call to discuss upon return.  Thank you.

## 2024-05-07 NOTE — PROGRESS NOTES
General Surgery Consult      Arlene Boone  Admit date: (Not on file)    MRN: [id number]     : 1970     Age: 53 y.o.        Attending Physician: Yolanda Berg MD [facility]      History of Present Illness:     Arlene Boone is a 53 y.o. female who was referred to me by Ms. Nancy Mo for evaluation of a possible incisional hernia.  The patient had a hysterectomy in 2019 and for about 3 years now she has been having pain at the site of one of the trocar incisions that is located in the supraumbilical area.  She is also been noticing a bulge and some swelling.  Her last CT scan of abdomen and pelvis was done in 2019.     Patient Active Problem List    Diagnosis Date Noted    Neuropraxia of right lower extremity 2019    S/P hysterectomy 2019    Essential hypertension 2019    Iron deficiency anemia due to chronic blood loss 2019    Menometrorrhagia 2019     Past Medical History:   Diagnosis Date    Essential hypertension 2019    Iron deficiency anemia due to chronic blood loss 2019    Low iron     Neuropraxia of right lower extremity 2019      Past Surgical History:   Procedure Laterality Date     SECTION      HYSTERECTOMY (CERVIX STATUS UNKNOWN)        Social History     Tobacco Use    Smoking status: Never    Smokeless tobacco: Never   Substance Use Topics    Alcohol use: Yes      Social History     Tobacco Use   Smoking Status Never   Smokeless Tobacco Never     Family History   Problem Relation Age of Onset    Breast Cancer Sister         50's    Breast Cancer Mother         50's    No Known Problems Father       Current Outpatient Medications   Medication Sig    amLODIPine (NORVASC) 5 MG tablet TAKE 1 TABLET BY MOUTH EVERY DAY  Indications: high blood pressure    hydroCHLOROthiazide 12.5 MG tablet Take 1 tablet by mouth daily    valACYclovir (VALTREX) 500 MG tablet Take 1 tablet by mouth daily Cold sore suppression    acetaminophen (TYLENOL) 650 MG

## 2024-05-08 ENCOUNTER — TELEPHONE (OUTPATIENT)
Age: 54
End: 2024-05-08

## 2024-05-24 DIAGNOSIS — I10 ESSENTIAL HYPERTENSION: ICD-10-CM

## 2024-05-24 NOTE — TELEPHONE ENCOUNTER
----- Message from Arlene Vasquez sent at 5/24/2024  8:41 AM EDT -----  Subject: Refill Request    QUESTIONS  Name of Medication? amLODIPine (NORVASC) 5 MG tablet  Patient-reported dosage and instructions? take once a day  How many days do you have left? 5  Preferred Pharmacy? [id number]  Pharmacy phone number (if available)? [phone]  Additional Information for Provider? patient has an appointment 6/4/24  ---------------------------------------------------------------------------  --------------,  Name of Medication? hydroCHLOROthiazide 12.5 MG tablet  Patient-reported dosage and instructions? take once a day  How many days do you have left? 5  Preferred Pharmacy? [id number]  Pharmacy phone number (if available)? [phone]  Additional Information for Provider? patient has an appointment 6/4/24  ---------------------------------------------------------------------------  --------------  CALL BACK INFO  What is the best way for the office to contact you? OK to leave message on   Repair Report,OK to respond with electronic message via [employer] portal (only   for patients who have registered [employer] account)  Preferred Call Back Phone Number? [phone]  ---------------------------------------------------------------------------  --------------  SCRIPT ANSWERS  Relationship to Patient? Self

## 2024-05-26 RX ORDER — HYDROCHLOROTHIAZIDE 12.5 MG/1
12.5 TABLET ORAL DAILY
Qty: 90 TABLET | Refills: 3 | Status: SHIPPED | OUTPATIENT
Start: 2024-05-26

## 2024-05-26 RX ORDER — AMLODIPINE BESYLATE 5 MG/1
TABLET ORAL
Qty: 90 TABLET | Refills: 3 | Status: SHIPPED | OUTPATIENT
Start: 2024-05-26

## 2024-05-31 ENCOUNTER — TELEPHONE (OUTPATIENT)
Facility: CLINIC | Age: 54
End: 2024-05-31

## 2024-05-31 NOTE — TELEPHONE ENCOUNTER
Pt passed out at work on Wed and EMS took her Sheri Keith. She states she was DX with 2 hernias by Dr. Guerrero across the causey from the office. She states she feels sick and her stomach feels full. She wants to know if someone could call her. She's scheduled to see [facility] Tuesday at 10:45.

## 2024-06-04 ENCOUNTER — OFFICE VISIT (OUTPATIENT)
Facility: CLINIC | Age: 54
End: 2024-06-04

## 2024-06-04 VITALS
SYSTOLIC BLOOD PRESSURE: 123 MMHG | WEIGHT: 178 LBS | RESPIRATION RATE: 17 BRPM | HEART RATE: 46 BPM | TEMPERATURE: 97.4 F | BODY MASS INDEX: 31.54 KG/M2 | DIASTOLIC BLOOD PRESSURE: 75 MMHG | HEIGHT: 63 IN | OXYGEN SATURATION: 97 %

## 2024-06-04 DIAGNOSIS — Z86.19 H/O COLD SORES: ICD-10-CM

## 2024-06-04 DIAGNOSIS — Z09 HOSPITAL DISCHARGE FOLLOW-UP: ICD-10-CM

## 2024-06-04 DIAGNOSIS — I44.0 PROLONGED P-R INTERVAL: ICD-10-CM

## 2024-06-04 DIAGNOSIS — R00.1 BRADYCARDIA: Primary | ICD-10-CM

## 2024-06-04 DIAGNOSIS — E05.90 SUBCLINICAL HYPERTHYROIDISM: ICD-10-CM

## 2024-06-04 DIAGNOSIS — R73.03 PREDIABETES: ICD-10-CM

## 2024-06-04 PROCEDURE — 99214 OFFICE O/P EST MOD 30 MIN: CPT | Performed by: NURSE PRACTITIONER

## 2024-06-04 PROCEDURE — 3078F DIAST BP <80 MM HG: CPT | Performed by: NURSE PRACTITIONER

## 2024-06-04 PROCEDURE — 3074F SYST BP LT 130 MM HG: CPT | Performed by: NURSE PRACTITIONER

## 2024-06-04 PROCEDURE — 1111F DSCHRG MED/CURRENT MED MERGE: CPT | Performed by: NURSE PRACTITIONER

## 2024-06-04 RX ORDER — VALACYCLOVIR HYDROCHLORIDE 500 MG/1
500 TABLET, FILM COATED ORAL DAILY
Qty: 90 TABLET | Refills: 1 | Status: SHIPPED | OUTPATIENT
Start: 2024-06-04

## 2024-06-04 ASSESSMENT — ANXIETY QUESTIONNAIRES
3. WORRYING TOO MUCH ABOUT DIFFERENT THINGS: SEVERAL DAYS
7. FEELING AFRAID AS IF SOMETHING AWFUL MIGHT HAPPEN: NOT AT ALL
4. TROUBLE RELAXING: NOT AT ALL
6. BECOMING EASILY ANNOYED OR IRRITABLE: NOT AT ALL
1. FEELING NERVOUS, ANXIOUS, OR ON EDGE: MORE THAN HALF THE DAYS
5. BEING SO RESTLESS THAT IT IS HARD TO SIT STILL: NOT AT ALL
2. NOT BEING ABLE TO STOP OR CONTROL WORRYING: SEVERAL DAYS
GAD7 TOTAL SCORE: 4

## 2024-06-04 ASSESSMENT — PATIENT HEALTH QUESTIONNAIRE - PHQ9
3. TROUBLE FALLING OR STAYING ASLEEP: NOT AT ALL
1. LITTLE INTEREST OR PLEASURE IN DOING THINGS: NOT AT ALL
8. MOVING OR SPEAKING SO SLOWLY THAT OTHER PEOPLE COULD HAVE NOTICED. OR THE OPPOSITE, BEING SO FIGETY OR RESTLESS THAT YOU HAVE BEEN MOVING AROUND A LOT MORE THAN USUAL: NOT AT ALL
SUM OF ALL RESPONSES TO PHQ QUESTIONS 1-9: 0
6. FEELING BAD ABOUT YOURSELF - OR THAT YOU ARE A FAILURE OR HAVE LET YOURSELF OR YOUR FAMILY DOWN: NOT AT ALL
5. POOR APPETITE OR OVEREATING: NOT AT ALL
9. THOUGHTS THAT YOU WOULD BE BETTER OFF DEAD, OR OF HURTING YOURSELF: NOT AT ALL
7. TROUBLE CONCENTRATING ON THINGS, SUCH AS READING THE NEWSPAPER OR WATCHING TELEVISION: NOT AT ALL
SUM OF ALL RESPONSES TO PHQ9 QUESTIONS 1 & 2: 0
SUM OF ALL RESPONSES TO PHQ QUESTIONS 1-9: 0
2. FEELING DOWN, DEPRESSED OR HOPELESS: NOT AT ALL
SUM OF ALL RESPONSES TO PHQ QUESTIONS 1-9: 0
4. FEELING TIRED OR HAVING LITTLE ENERGY: NOT AT ALL
SUM OF ALL RESPONSES TO PHQ QUESTIONS 1-9: 0

## 2024-06-04 NOTE — PROGRESS NOTES
Arlene Boone is a 53 y.o. year old female who presents today for   Chief Complaint   Patient presents with    Follow-up       Is someone accompanying this pt? no    Is the patient using any DME equipment during OV? no    Depression Screenin/4/2024    10:40 AM 2024     4:11 PM 2/3/2023    10:46 AM 2023    10:13 AM 2022     5:24 PM 7/15/2022     9:02 AM 2022    12:53 PM   PHQ-9 Questionaire   Little interest or pleasure in doing things 0 0 0 3 0 0 0   Feeling down, depressed, or hopeless 0 2 0 2 0 0 0   Trouble falling or staying asleep, or sleeping too much 0 2        Feeling tired or having little energy 0 3        Poor appetite or overeating 0 0        Feeling bad about yourself - or that you are a failure or have let yourself or your family down 0 0        Trouble concentrating on things, such as reading the newspaper or watching television 0 0        Moving or speaking so slowly that other people could have noticed. Or the opposite - being so fidgety or restless that you have been moving around a lot more than usual 0 0        Thoughts that you would be better off dead, or of hurting yourself in some way 0 0        PHQ-9 Total Score 0 7 0 5 0 0 0       Abuse Screening:       No data to display                Learning Assessment:  No question data found.    Fall Risk:       No data to display                    Coordination of Care:   1. \"Have you been to the ER, urgent care clinic since your last visit?  Hospitalized since your last visit?\" Yes, syncope    2. \"Have you seen or consulted any other health care providers outside of the [facility] since your last visit?\" no    3. For patients aged 45-75: Has the patient had a colonoscopy / FIT/ Cologuard? no    If the patient is female:    4. For patients aged 40-74: Has the patient had a mammogram within the past 2 years? no    5. For patients aged 21-65: Has the patient had a pap smear? no    Health Maintenance:

## 2024-06-04 NOTE — PROGRESS NOTES
Arlene Boone is a 53 y.o. female  established patient, here for evaluation of the following chief complaint(s):  Chief Complaint   Patient presents with    Follow-Up from Hospital     ER follow up      Assessment and Plan  1. Bradycardia  -     [facility] - Edmond Jason MD, Cardiology, [place] ([address]  2. H/O cold sores  Comments:  She only takes the Valtrex when she feels her lip or nose tingling  Orders:  -     valACYclovir (VALTREX) 500 MG tablet; Take 1 tablet by mouth daily Cold sore suppression, Disp-90 tablet, R-1Normal  3. Prolonged P-R interval  -     [facility] - Edmond Jason MD, Cardiology, [place] ([address])  4. Hospital discharge follow-up  5. Prediabetes  Comments:  pt declined Metformin and will make diet changes  6. Subclinical hyperthyroidism  -     External Referral To Endocrinology       Return in about 3 months (around 9/4/2024), or if symptoms worsen or fail to improve, for Routine, 15.   HPI:   In office visit.    Pt reports last Wednesday, 5/59/2024 she was feeling fine sitting at her desk, then felt hot and was sweati. She \"passed out\" then her co-workers to took her the hospital. She came to when the co-worker was putting her the car. She was given IV fluids and took imaging her abdomen. They saw the 2 hernias in her abdomen but found nothing else to blame for her symptoms.  Pt has asymptomatic bradycardia here in the clinic today.  Pt denies she is taking any supplements or drugs. Pt reports she had breakfast that morning. Pt denies she has any symptoms since the incident 5/29/2024.    Pt was advised today she follow-up w/ Dr. Yolanda Berg       5/29/2024 CT ABDOMEN/PELVIS W/ CONTRAST   1. Small hiatal hernia.    2. Small fat-containing umbilical hernia.     EKG 5/29/2024   - BORDERLINE ECG -    Impression SR-Sinus rhythm-normal P axis, V-rate 50-99   Impression BAVCD-Borderline prolonged AZ interval-AZ >202, V-rate 50-90       ROS:    General: negative for - chills, fever, weight changes

## 2024-06-05 ENCOUNTER — TELEPHONE (OUTPATIENT)
Age: 54
End: 2024-06-05

## 2024-06-05 NOTE — TELEPHONE ENCOUNTER
Ms. Boone called to request the removal of restrictions on her employment disability documents because her employer is recommending that she take a leave of absence and she do not want to take the absence at pay cut of 60%.  Ms. Boone states she'd like to come in to see Dr. Berg regarding the two hernias.  Appointment scheduled for Tuesday, June 6, 2024 at 8:00am with Dr. Berg.  Request to email documents to [email or web address] and to also email a copy to her at [email or web address].  Will update forms and email as requested.

## 2024-06-25 ENCOUNTER — OFFICE VISIT (OUTPATIENT)
Age: 54
End: 2024-06-25

## 2024-06-25 ENCOUNTER — PREP FOR PROCEDURE (OUTPATIENT)
Age: 54
End: 2024-06-25

## 2024-06-25 VITALS
HEIGHT: 63 IN | HEART RATE: 67 BPM | DIASTOLIC BLOOD PRESSURE: 78 MMHG | BODY MASS INDEX: 31.54 KG/M2 | TEMPERATURE: 97.7 F | WEIGHT: 178 LBS | SYSTOLIC BLOOD PRESSURE: 125 MMHG

## 2024-06-25 DIAGNOSIS — I10 ESSENTIAL HYPERTENSION: ICD-10-CM

## 2024-06-25 DIAGNOSIS — K43.2 INCISIONAL HERNIA, WITHOUT OBSTRUCTION OR GANGRENE: Primary | ICD-10-CM

## 2024-06-25 PROCEDURE — 3078F DIAST BP <80 MM HG: CPT | Performed by: SURGERY

## 2024-06-25 PROCEDURE — 99214 OFFICE O/P EST MOD 30 MIN: CPT | Performed by: SURGERY

## 2024-06-25 PROCEDURE — 3074F SYST BP LT 130 MM HG: CPT | Performed by: SURGERY

## 2024-06-25 NOTE — PATIENT INSTRUCTIONS
If you have any questions or concerns about today's appointment, the verbal and/or written instructions you were given for follow up care, please call our office at [phone].    [facility] [place]  [address], [address]  [place] [address] [phone] office  [phone] fax     PATIENT PRE AND POST OPERATIVE INSTRUCTIONS     [address] [address] [phone]    Before Surgery Instructions:   1) You must have someone available to drive you to and from your procedure and stay with you for the first 24 hours.  2) It is very important that you have nothing to eat or drink after midnight the night before your surgery. This includes chewing gum or sucking on hard candy.  Take only heart, blood pressure and cholesterol medications the morning of surgery with only a sip of water.  3) Please stop taking Plavix 5-7 days prior to your surgery with prescribing physician approval.  Stop taking Coumadin 5 days prior to your surgery with prescribing physician approval.  Stop taking all Aspirin or Aspirin containing products 7 days prior to your surgery. Stop taking Advil, Motrin, Aleve, and etc. 3 days prior to your surgery.  4) If you take any diabetic medications please consult with your primary care physician on how to take them on the day of your surgery  5) Please stop all Herbal products 2 weeks prior to your surgery.  6) Please arrive at the hospital 2 hours prior to your surgery, unless you have been otherwise instructed.  7) Patients having an operation on their colon will be given a separate instruction sheet on their Bowel Prep.  8) For any pre-operative work up check in at the main entrance to [facility], and then go to Patient Registration. These studies are done on a walk in basis they are open from 7:00am to 5:00pm Monday through Friday.  9) A urine drug screen will be performed on the day of

## 2024-06-25 NOTE — PROGRESS NOTES
Arlene Boone is a 53 y.o. female (: 1970) presenting to address:    Chief Complaint   Patient presents with    Follow-up     Discuss CT Abd Pelv done on 24 and Discuss work restrictions for umbilical hernia       Medication list and allergies have been reviewed with Arlene Boone and updated as of today's date.     I have gone over all Medical, Surgical and Social History with Arlene Boone and updated/added the information accordingly.       1. Have you been to the ER, Urgent Care or Hospitalized since your last visit? [facility]          2. Have you followed up with your PCP or any other Physicians since your procedure/ last office visit?   No

## 2024-06-25 NOTE — PROGRESS NOTES
General Surgery Consult      Arlene Boone  Admit date: (Not on file)    MRN: [id number]     : 1970     Age: 53 y.o.        Attending Physician: Yolanda Berg MD [facility]      History of Present Illness:     Arlene Boone is a 53 y.o. female who is here for follow-up on her abdominal pain and her incisional hernia and to discuss the CT scan of abdomen and pelvis.  I have seen the patient last month and she was referred to me originally by Dr. Nancy Mo for evaluation of abdominal pain and a bulge in the supraumbilical and periumbilical area.  The patient stated that she had a robotic hysterectomy and her wounds that located around the umbilicus gave her most of the pain.  She stated that a couple months after the surgery she noticed a bulge in this area and since then the bulge comes and goes and it is causing significant pain and I did get a CT scan that confirmed the presence of a hernia in this area.  The patient stated that she works at [employer] and she does lifting and her pain is significant and she would like to proceed with the surgery as soon as possible so she can get back to work.    Patient Active Problem List    Diagnosis Date Noted    Neuropraxia of right lower extremity 2019    S/P hysterectomy 2019    Essential hypertension 2019    Iron deficiency anemia due to chronic blood loss 2019    Menometrorrhagia 2019     Past Medical History:   Diagnosis Date    Essential hypertension 2019    Iron deficiency anemia due to chronic blood loss 2019    Low iron     Neuropraxia of right lower extremity 2019      Past Surgical History:   Procedure Laterality Date     SECTION      HYSTERECTOMY (CERVIX STATUS UNKNOWN)        Social History     Tobacco Use    Smoking status: Never    Smokeless tobacco: Never   Substance Use Topics    Alcohol use: Yes      Social History     Tobacco Use   Smoking Status Never   Smokeless Tobacco Never     Family

## 2024-07-10 ENCOUNTER — TELEPHONE (OUTPATIENT)
Age: 54
End: 2024-07-10

## 2024-07-10 ENCOUNTER — TELEPHONE (OUTPATIENT)
Facility: CLINIC | Age: 54
End: 2024-07-10

## 2024-07-10 NOTE — TELEPHONE ENCOUNTER
Ms. Boone called requesting to cancel her surgery scheduled on Friday, August 9, 2024 with Dr. Berg due to insurance.  She changed her insurance to [facility] and was informed if surgery is performed at [facility] facility coverage is 100%. I explained Dr. Berg only perform surgeries at [facility]. Ms. Boone indicate she has spoken to her PCP to assist with a new surgery referral and was given a name (Scotty Candelaria MD).  She's called Dr. Candelaria office and was told they will need a referral to schedule her an appointment.  Ms. Boone will contact her PCP, Nancy Mo NP office to request referral for [facility].

## 2024-07-10 NOTE — TELEPHONE ENCOUNTER
Pt called the office to express that she is unable to schedule with General surgery that we referred her to due to insurance problems and coverage. Pt stated she would like a new referral to be sent to see Dr. Scotty Harris at [facility] so she may get scheduled with him ASAP. Pt stated her surgery date is scheduled for 8/9/24.    Pt requested a phone call       Please advise

## 2024-07-11 ENCOUNTER — TELEPHONE (OUTPATIENT)
Facility: CLINIC | Age: 54
End: 2024-07-11

## 2024-07-11 DIAGNOSIS — K43.2 INCISIONAL HERNIA, WITHOUT OBSTRUCTION OR GANGRENE: Primary | ICD-10-CM

## 2024-07-11 NOTE — PROGRESS NOTES
Patient's referral to general surgery related to incisional hernia was ordered for Dr. Scotty Wade

## 2024-07-11 NOTE — TELEPHONE ENCOUNTER
----- Message from Jese Dumont sent at 7/9/2024  3:52 PM EDT -----  Regarding: [facility] Referral Request  [facility] Referral Request    Reason for referral request: Specialty Provider    Specialist/Lab/Test patient is requesting (if known): Specialist    Specialist Phone Number (if applicable):[phone]    Additional Information Patient would like to request for a referral for a specialist([facility])  --------------------------------------------------------------------------------------------------------------------------    Relationship to Patient: Self     Call Back Information: OK to leave message on voicemail  Preferred Call Back Number: Phone [phone]

## 2024-07-11 NOTE — TELEPHONE ENCOUNTER
Patient called in asking about the status of the referral for surgery. She stated the last referral to Dr. Berg had to be changed as they did not accept her insurance. She was told to get the referral sent to [facility], Dr. Scotty Harris, and that is where she wants the new referral sent.

## 2024-07-12 NOTE — TELEPHONE ENCOUNTER
Called pt and informed her that referral was sent. She confirmed that she already scheduled appt w/ them.

## 2024-11-01 ENCOUNTER — TELEPHONE (OUTPATIENT)
Age: 54
End: 2024-11-01

## 2024-11-01 NOTE — TELEPHONE ENCOUNTER
Ms. Boone called stating she received notification her financial assistance application was denied because she didn't send the supporting documents.  Ms. Boone indicate she mailed paycheck stubs and bank statements as requested. I told Ms. Boone per review of her chart the application and paycheck stubs were scanned in her chart on September 10, 2024 and I'll contact the hospital financial counselor for further assistance.  Ms. Boone states she's called the toll free number several times however she's not able to contact anyone and when she finally made contact with a representative she was informed there was no one available to assist her.  Ms. Boone states she's trying to resolve this matter before the bill get out of hand.  I told Mr Boone I would provide her an update next week/as I await response to email and phone calls to the financial counselors.

## 2024-11-01 NOTE — TELEPHONE ENCOUNTER
Left voicemail message for Gogo at [facility] financial counselor office to contact Ms. Boone to discuss her financial assistance application. Email sent to Ms. Toni Delatorre who scanned financiall assistance application on September 10, 2024.

## 2024-11-04 NOTE — TELEPHONE ENCOUNTER
Spoke to Ms. Boone to inform per email I received from Ms. Toni Delatorre a letter was mailed on September 13, 2024 requesting paycheck stubs and 30 bank statement.  They received the paycheck stubs but no bank statement.  Ms. Boone states she mailed the bank statement along with the paycheck stubs.  I explained they may have become detached. Ms. Boone indicate she'll send in another copy of her bank statement.  Per Ms. Delatorre once the bank statement is received they will complete processing her application.

## 2024-11-19 DIAGNOSIS — Z12.31 ENCOUNTER FOR SCREENING MAMMOGRAM FOR MALIGNANT NEOPLASM OF BREAST: Primary | ICD-10-CM

## 2024-12-16 ENCOUNTER — COMMUNITY OUTREACH (OUTPATIENT)
Facility: CLINIC | Age: 54
End: 2024-12-16

## 2025-02-04 ENCOUNTER — TELEPHONE (OUTPATIENT)
Age: 55
End: 2025-02-04

## 2025-02-04 NOTE — TELEPHONE ENCOUNTER
Spoke to Ms. Boone regarding voicemail she left inquiring about assistance with financial assistance application she's re-submitted required documents/bank statements via mail on January 17, 2025.  Would like to confirm receipt because she has to pay out of pocket for copies of bank statements.  Informed I sent an email this morning to [facility] public benefits specialists this morning regarding her request for assistance and I'll notify her when I receive a response.  
20.07

## 2025-03-17 ENCOUNTER — TELEPHONE (OUTPATIENT)
Facility: CLINIC | Age: 55
End: 2025-03-17

## 2025-03-17 NOTE — TELEPHONE ENCOUNTER
Pt called requesting a call back from the nurse. She stated she had a few questions but she did state she has tested positive for Covid.    Please advise

## 2025-06-19 DIAGNOSIS — I10 ESSENTIAL HYPERTENSION: ICD-10-CM

## 2025-06-19 DIAGNOSIS — Z86.19 H/O COLD SORES: ICD-10-CM

## 2025-06-19 RX ORDER — AMLODIPINE BESYLATE 5 MG/1
TABLET ORAL
Qty: 30 TABLET | Refills: 0 | Status: SHIPPED | OUTPATIENT
Start: 2025-06-19

## 2025-06-19 RX ORDER — HYDROCHLOROTHIAZIDE 12.5 MG/1
12.5 TABLET ORAL DAILY
Qty: 30 TABLET | Refills: 0 | Status: SHIPPED | OUTPATIENT
Start: 2025-06-19

## 2025-06-19 RX ORDER — VALACYCLOVIR HYDROCHLORIDE 500 MG/1
500 TABLET, FILM COATED ORAL DAILY
Qty: 30 TABLET | Refills: 0 | Status: SHIPPED | OUTPATIENT
Start: 2025-06-19

## 2025-06-19 NOTE — TELEPHONE ENCOUNTER
Medication(s) requesting:   Requested Prescriptions     Pending Prescriptions Disp Refills    hydroCHLOROthiazide 12.5 MG tablet 90 tablet 3     Sig: Take 1 tablet by mouth daily    amLODIPine (NORVASC) 5 MG tablet 90 tablet 3     Sig: TAKE 1 TABLET BY MOUTH EVERY DAY  Indications: high blood pressure    valACYclovir (VALTREX) 500 MG tablet 90 tablet 1     Sig: Take 1 tablet by mouth daily Cold sore suppression       Last office visit:  6/4/2024  Next office visit DMA: 6/23/2025

## 2025-07-17 ENCOUNTER — HOSPITAL ENCOUNTER (OUTPATIENT)
Facility: HOSPITAL | Age: 55
Setting detail: SPECIMEN
Discharge: HOME | End: 2025-07-20

## 2025-07-17 ENCOUNTER — OFFICE VISIT (OUTPATIENT)
Facility: CLINIC | Age: 55
End: 2025-07-17

## 2025-07-17 VITALS
HEIGHT: 63 IN | OXYGEN SATURATION: 96 % | TEMPERATURE: 97.3 F | DIASTOLIC BLOOD PRESSURE: 85 MMHG | HEART RATE: 47 BPM | BODY MASS INDEX: 30.83 KG/M2 | RESPIRATION RATE: 16 BRPM | WEIGHT: 174 LBS | SYSTOLIC BLOOD PRESSURE: 168 MMHG

## 2025-07-17 DIAGNOSIS — Z13.0 SCREENING FOR DEFICIENCY ANEMIA: ICD-10-CM

## 2025-07-17 DIAGNOSIS — Z13.220 SCREENING FOR CHOLESTEROL LEVEL: ICD-10-CM

## 2025-07-17 DIAGNOSIS — E55.9 VITAMIN D DEFICIENCY: ICD-10-CM

## 2025-07-17 DIAGNOSIS — Z13.1 SCREENING FOR DIABETES MELLITUS (DM): ICD-10-CM

## 2025-07-17 DIAGNOSIS — Z13.228 SCREENING FOR METABOLIC DISORDER: ICD-10-CM

## 2025-07-17 DIAGNOSIS — Z23 IMMUNIZATION DUE: ICD-10-CM

## 2025-07-17 DIAGNOSIS — Z13.29 SCREENING FOR THYROID DISORDER: ICD-10-CM

## 2025-07-17 DIAGNOSIS — Z00.00 ANNUAL PHYSICAL EXAM: Primary | ICD-10-CM

## 2025-07-17 DIAGNOSIS — Z12.11 SCREEN FOR COLON CANCER: ICD-10-CM

## 2025-07-17 DIAGNOSIS — Z13.89 SCREENING FOR BLOOD OR PROTEIN IN URINE: ICD-10-CM

## 2025-07-17 DIAGNOSIS — E78.00 ELEVATED LDL CHOLESTEROL LEVEL: ICD-10-CM

## 2025-07-17 DIAGNOSIS — I10 ESSENTIAL HYPERTENSION: ICD-10-CM

## 2025-07-17 DIAGNOSIS — R73.03 PREDIABETES: ICD-10-CM

## 2025-07-17 LAB
25(OH)D3 SERPL-MCNC: 11.1 NG/ML (ref 30–100)
ALBUMIN SERPL-MCNC: 3.6 G/DL (ref 3.4–5)
ALBUMIN/GLOB SERPL: 1.2 (ref 0.8–1.7)
ALP SERPL-CCNC: 90 U/L (ref 45–117)
ALT SERPL-CCNC: 19 U/L (ref 10–35)
ANION GAP SERPL CALC-SCNC: 10 MMOL/L (ref 3–18)
APPEARANCE UR: CLEAR
AST SERPL-CCNC: 16 U/L (ref 10–38)
BACTERIA URNS QL MICRO: ABNORMAL /HPF
BASOPHILS # BLD: 0.1 K/UL (ref 0–0.1)
BASOPHILS NFR BLD: 2.1 % (ref 0–2)
BILIRUB SERPL-MCNC: 0.5 MG/DL (ref 0.2–1)
BILIRUB UR QL: NEGATIVE
BUN SERPL-MCNC: 10 MG/DL (ref 6–23)
BUN/CREAT SERPL: 14 (ref 12–20)
CALCIUM SERPL-MCNC: 9.4 MG/DL (ref 8.5–10.1)
CHLORIDE SERPL-SCNC: 107 MMOL/L (ref 98–107)
CHOLEST SERPL-MCNC: 210 MG/DL
CO2 SERPL-SCNC: 25 MMOL/L (ref 21–32)
COLOR UR: YELLOW
CREAT SERPL-MCNC: 0.73 MG/DL (ref 0.6–1.3)
DIFFERENTIAL METHOD BLD: ABNORMAL
EOSINOPHIL # BLD: 0.2 K/UL (ref 0–0.4)
EOSINOPHIL NFR BLD: 4.2 % (ref 0–5)
EPITH CASTS URNS QL MICRO: ABNORMAL /LPF (ref 0–5)
ERYTHROCYTE [DISTWIDTH] IN BLOOD BY AUTOMATED COUNT: 14.5 % (ref 11.6–14.5)
EST. AVERAGE GLUCOSE BLD GHB EST-MCNC: 126 MG/DL
GLOBULIN SER CALC-MCNC: 3 G/DL (ref 2–4)
GLUCOSE SERPL-MCNC: 107 MG/DL (ref 74–108)
GLUCOSE UR STRIP.AUTO-MCNC: NEGATIVE MG/DL
HBA1C MFR BLD: 6 % (ref 4.2–5.6)
HCT VFR BLD AUTO: 39.1 % (ref 35–45)
HDLC SERPL-MCNC: 58 MG/DL (ref 40–60)
HDLC SERPL: 3.6 (ref 0–5)
HGB BLD-MCNC: 13 G/DL (ref 12–16)
HGB UR QL STRIP: NEGATIVE
IMM GRANULOCYTES # BLD AUTO: 0.01 K/UL (ref 0–0.04)
IMM GRANULOCYTES NFR BLD AUTO: 0.2 % (ref 0–0.5)
KETONES UR QL STRIP.AUTO: NEGATIVE MG/DL
LDLC SERPL CALC-MCNC: 138 MG/DL (ref 0–100)
LEUKOCYTE ESTERASE UR QL STRIP.AUTO: NEGATIVE
LYMPHOCYTES # BLD: 1.47 K/UL (ref 0.9–3.6)
LYMPHOCYTES NFR BLD: 31.2 % (ref 21–52)
MCH RBC QN AUTO: 30 PG (ref 24–34)
MCHC RBC AUTO-ENTMCNC: 33.2 G/DL (ref 31–37)
MCV RBC AUTO: 90.3 FL (ref 78–100)
MONOCYTES # BLD: 0.39 K/UL (ref 0.05–1.2)
MONOCYTES NFR BLD: 8.3 % (ref 3–10)
NEUTS SEG # BLD: 2.54 K/UL (ref 1.8–8)
NEUTS SEG NFR BLD: 54 % (ref 40–73)
NITRITE UR QL STRIP.AUTO: NEGATIVE
NRBC # BLD: 0 K/UL (ref 0–0.01)
NRBC BLD-RTO: 0 PER 100 WBC
PH UR STRIP: 6 (ref 5–8)
PLATELET # BLD AUTO: 183 K/UL (ref 135–420)
PMV BLD AUTO: 12.9 FL (ref 9.2–11.8)
POTASSIUM SERPL-SCNC: 3.8 MMOL/L (ref 3.5–5.5)
PROT SERPL-MCNC: 6.6 G/DL (ref 6.4–8.2)
PROT UR STRIP-MCNC: NEGATIVE MG/DL
RBC # BLD AUTO: 4.33 M/UL (ref 4.2–5.3)
RBC #/AREA URNS HPF: ABNORMAL /HPF (ref 0–5)
SODIUM SERPL-SCNC: 143 MMOL/L (ref 136–145)
SP GR UR REFRACTOMETRY: 1.01 (ref 1–1.03)
T4 FREE SERPL-MCNC: 1.4 NG/DL (ref 0.9–1.7)
TRIGL SERPL-MCNC: 69 MG/DL (ref 0–150)
TSH, 3RD GENERATION: 0.86 UIU/ML (ref 0.27–4.2)
UROBILINOGEN UR QL STRIP.AUTO: 0.2 EU/DL (ref 0.2–1)
VLDLC SERPL CALC-MCNC: 14 MG/DL
WBC # BLD AUTO: 4.7 K/UL (ref 4.6–13.2)
WBC URNS QL MICRO: ABNORMAL /HPF (ref 0–5)

## 2025-07-17 PROCEDURE — 80061 LIPID PANEL: CPT

## 2025-07-17 PROCEDURE — 84443 ASSAY THYROID STIM HORMONE: CPT

## 2025-07-17 PROCEDURE — 85025 COMPLETE CBC W/AUTO DIFF WBC: CPT

## 2025-07-17 PROCEDURE — 82306 VITAMIN D 25 HYDROXY: CPT

## 2025-07-17 PROCEDURE — 83036 HEMOGLOBIN GLYCOSYLATED A1C: CPT

## 2025-07-17 PROCEDURE — 81001 URINALYSIS AUTO W/SCOPE: CPT

## 2025-07-17 PROCEDURE — 90677 PCV20 VACCINE IM: CPT | Performed by: NURSE PRACTITIONER

## 2025-07-17 PROCEDURE — 80053 COMPREHEN METABOLIC PANEL: CPT

## 2025-07-17 PROCEDURE — 3077F SYST BP >= 140 MM HG: CPT | Performed by: NURSE PRACTITIONER

## 2025-07-17 PROCEDURE — 3079F DIAST BP 80-89 MM HG: CPT | Performed by: NURSE PRACTITIONER

## 2025-07-17 PROCEDURE — 84439 ASSAY OF FREE THYROXINE: CPT

## 2025-07-17 PROCEDURE — 90471 IMMUNIZATION ADMIN: CPT | Performed by: NURSE PRACTITIONER

## 2025-07-17 PROCEDURE — 36415 COLL VENOUS BLD VENIPUNCTURE: CPT

## 2025-07-17 PROCEDURE — 99396 PREV VISIT EST AGE 40-64: CPT | Performed by: NURSE PRACTITIONER

## 2025-07-17 RX ORDER — HYDROCHLOROTHIAZIDE 12.5 MG/1
12.5 TABLET ORAL DAILY
Qty: 30 TABLET | Refills: 0 | Status: CANCELLED | OUTPATIENT
Start: 2025-07-17

## 2025-07-17 RX ORDER — AMLODIPINE BESYLATE 5 MG/1
TABLET ORAL
Qty: 30 TABLET | Refills: 0 | Status: CANCELLED | OUTPATIENT
Start: 2025-07-17

## 2025-07-17 SDOH — ECONOMIC STABILITY: FOOD INSECURITY: WITHIN THE PAST 12 MONTHS, YOU WORRIED THAT YOUR FOOD WOULD RUN OUT BEFORE YOU GOT MONEY TO BUY MORE.: NEVER TRUE

## 2025-07-17 SDOH — ECONOMIC STABILITY: FOOD INSECURITY: WITHIN THE PAST 12 MONTHS, THE FOOD YOU BOUGHT JUST DIDN'T LAST AND YOU DIDN'T HAVE MONEY TO GET MORE.: NEVER TRUE

## 2025-07-17 ASSESSMENT — PATIENT HEALTH QUESTIONNAIRE - PHQ9
SUM OF ALL RESPONSES TO PHQ QUESTIONS 1-9: 0
SUM OF ALL RESPONSES TO PHQ QUESTIONS 1-9: 0
6. FEELING BAD ABOUT YOURSELF - OR THAT YOU ARE A FAILURE OR HAVE LET YOURSELF OR YOUR FAMILY DOWN: NOT AT ALL
10. IF YOU CHECKED OFF ANY PROBLEMS, HOW DIFFICULT HAVE THESE PROBLEMS MADE IT FOR YOU TO DO YOUR WORK, TAKE CARE OF THINGS AT HOME, OR GET ALONG WITH OTHER PEOPLE: NOT DIFFICULT AT ALL
3. TROUBLE FALLING OR STAYING ASLEEP: NOT AT ALL
8. MOVING OR SPEAKING SO SLOWLY THAT OTHER PEOPLE COULD HAVE NOTICED. OR THE OPPOSITE, BEING SO FIGETY OR RESTLESS THAT YOU HAVE BEEN MOVING AROUND A LOT MORE THAN USUAL: NOT AT ALL
7. TROUBLE CONCENTRATING ON THINGS, SUCH AS READING THE NEWSPAPER OR WATCHING TELEVISION: NOT AT ALL
SUM OF ALL RESPONSES TO PHQ QUESTIONS 1-9: 0
SUM OF ALL RESPONSES TO PHQ QUESTIONS 1-9: 0
5. POOR APPETITE OR OVEREATING: NOT AT ALL
9. THOUGHTS THAT YOU WOULD BE BETTER OFF DEAD, OR OF HURTING YOURSELF: NOT AT ALL
2. FEELING DOWN, DEPRESSED OR HOPELESS: NOT AT ALL
4. FEELING TIRED OR HAVING LITTLE ENERGY: NOT AT ALL
1. LITTLE INTEREST OR PLEASURE IN DOING THINGS: NOT AT ALL

## 2025-07-17 NOTE — PATIENT INSTRUCTIONS
[facility]  Address: [address] [place] [id number]  Phone: [phone]

## 2025-07-17 NOTE — PROGRESS NOTES
Arlene Boone is a 55 y.o. female  established patient, here for evaluation of the following chief complaint(s):  Chief Complaint   Patient presents with    Annual Exam      Assessment and Plan  1. Annual physical exam  2. Prediabetes  -     Hemoglobin A1C; Future  3. Screening for diabetes mellitus (DM)  -     Hemoglobin A1C; Future  4. Screening for blood or protein in urine  -     Urinalysis with Microscopic; Future  5. Screening for metabolic disorder  -     Comprehensive Metabolic Panel; Future  6. Screening for cholesterol level  -     Lipid Panel; Future  7. Screening for thyroid disorder  -     TSH + Free T4 Panel; Future  8. Screening for deficiency anemia  -     CBC with Auto Differential; Future  9. Elevated LDL cholesterol level  10. Vitamin D deficiency  -     Vitamin D 25 Hydroxy; Future  11. Essential hypertension  12. Screen for colon cancer  -     Cologuard (Fecal DNA Colorectal Cancer Screening)  13. Immunization due  -     Pneumococcal, PCV20, PREVNAR 20, (age 6w+), IM, PF       Return in about 1 month (around 8/17/2025) for results, 15.   HPI:   In office visit for annual exam    Pt has a recent H/O abdomen hernia surgery. She has some pain at incision site when lifting heavy things at work.  Unremarkable abdomen exam and she has no pain in her abdomen at this time.  Advised patient to follow-up with Dr. Rivas as needed. Her work has been advised she is not to lift anything heavier than 10 lbs. Her work has not accommodated her.     Hypertension  On amlodipine 5 mg, HCTZ 12.5 mg. BP is high and asymptomatic. Been out of medication   Reduce sodium intake, processed foods and exercise at least 30 minutes of light cardio 3 times a week.  In addition, consider adding light weights for your back and legs.  Monitor your blood pressure at home.    Discussed screening for HIV, declined   Discussed screening for colorectal cancer, cologuard  Discussed Prevnar 20, today    Health maintenance  Breast

## 2025-08-08 DIAGNOSIS — I10 ESSENTIAL HYPERTENSION: ICD-10-CM

## 2025-08-08 RX ORDER — HYDROCHLOROTHIAZIDE 12.5 MG/1
12.5 TABLET ORAL DAILY
Qty: 90 TABLET | Refills: 3 | Status: SHIPPED | OUTPATIENT
Start: 2025-08-08

## 2025-08-08 RX ORDER — HYDROCHLOROTHIAZIDE 12.5 MG/1
12.5 TABLET ORAL DAILY
Qty: 90 TABLET | Refills: 1 | OUTPATIENT
Start: 2025-08-08

## 2025-08-08 RX ORDER — AMLODIPINE BESYLATE 5 MG/1
TABLET ORAL
Qty: 90 TABLET | Refills: 1 | OUTPATIENT
Start: 2025-08-08

## 2025-08-08 RX ORDER — AMLODIPINE BESYLATE 5 MG/1
TABLET ORAL
Qty: 90 TABLET | Refills: 3 | Status: SHIPPED | OUTPATIENT
Start: 2025-08-08

## 2025-08-11 ENCOUNTER — TELEPHONE (OUTPATIENT)
Facility: CLINIC | Age: 55
End: 2025-08-11

## 2025-08-11 ENCOUNTER — TELEMEDICINE (OUTPATIENT)
Facility: CLINIC | Age: 55
End: 2025-08-11

## 2025-08-11 DIAGNOSIS — Z87.19 HISTORY OF ABDOMINAL HERNIA: Primary | ICD-10-CM

## 2025-08-11 DIAGNOSIS — E78.00 ELEVATED LDL CHOLESTEROL LEVEL: ICD-10-CM

## 2025-08-11 DIAGNOSIS — E55.9 VITAMIN D DEFICIENCY: ICD-10-CM

## 2025-08-11 PROCEDURE — 99214 OFFICE O/P EST MOD 30 MIN: CPT | Performed by: NURSE PRACTITIONER

## 2025-08-11 RX ORDER — MULTIVIT-MIN/IRON/FOLIC ACID/K 18-600-40
4000 CAPSULE ORAL DAILY
Qty: 90 CAPSULE | Refills: 3 | Status: SHIPPED | OUTPATIENT
Start: 2025-08-11

## (undated) DEVICE — DRAPE SURG EQUIP W105XH13XL20IN 3 ARM DISPOSABLE DA VINCI S

## (undated) DEVICE — PREP SKN CHLRAPRP 26ML TNT -- CONVERT TO ITEM 373320

## (undated) DEVICE — SUTURE V-LOC 180 SZ 0 L12IN ABSRB GRN L37MM GS-21 1/2 CIR VLOCL0316

## (undated) DEVICE — TROCAR ENDOSCP L100MM DIA12MM STBL SL BLDELSS ENDOPATH XCEL

## (undated) DEVICE — SUTURE MCRYL SZ 4-0 L18IN ABSRB UD L19MM PS-2 3/8 CIR PRIM Y496G

## (undated) DEVICE — SOL IRR NACL 0.9% 500ML POUR --

## (undated) DEVICE — SOL ANTI-FOG 6ML MEDC -- MEDICHOICE - CONVERT TO 358427

## (undated) DEVICE — BLADELESS OPTICAL TROCAR WITH FIXATION CANNULA: Brand: VERSAPORT

## (undated) DEVICE — BLADELESS OPTICAL TROCAR WITH FIXATION CANNULA: Brand: VERSAONE

## (undated) DEVICE — DISPOSABLE SUCTION/IRRIGATOR TUBE SET WITH TIP: Brand: AHTO

## (undated) DEVICE — ELECTRO LUBE IS A SINGLE PATIENT USE DEVICE THAT IS INTENDED TO BE USED ON ELECTROSURGICAL ELECTRODES TO REDUCE STICKING.: Brand: KEY SURGICAL ELECTRO LUBE

## (undated) DEVICE — SYR 10ML CTRL LR LCK NSAF LF --

## (undated) DEVICE — KENDALL SCD EXPRESS SLEEVES, KNEE LENGTH, MEDIUM: Brand: KENDALL SCD

## (undated) DEVICE — LIGHT HANDLE: Brand: DEVON

## (undated) DEVICE — LEGGINGS, PAIR, 31X48, STERILE: Brand: MEDLINE

## (undated) DEVICE — DERMABOND SKIN ADH 0.7ML -- DERMABOND ADVANCED 12/BX

## (undated) DEVICE — Z DISCONTINUED USE 2639304 STRAP POS W3INXL30FT WIDE BK TO BK HK AND LOOP CLSR ALISTRP

## (undated) DEVICE — TRAY CATH 16F URIN MTR LTX -- CONVERT TO ITEM 363111

## (undated) DEVICE — SOL INJ SOD CL 0.9% 1000ML BG --

## (undated) DEVICE — SHEARS: Brand: ENDO SHEARS

## (undated) DEVICE — SYSTEM FASCIAL CLSR UNIQUE SHLDED WNG SAFE UNIF CONSISTENT

## (undated) DEVICE — SUT PROL 0 30IN CT1 BLU --

## (undated) DEVICE — SHEET,DRAPE,70X100,STERILE: Brand: MEDLINE

## (undated) DEVICE — [HIGH FLOW INSUFFLATOR,  DO NOT USE IF PACKAGE IS DAMAGED,  KEEP DRY,  KEEP AWAY FROM SUNLIGHT,  PROTECT FROM HEAT AND RADIOACTIVE SOURCES.]: Brand: PNEUMOSURE

## (undated) DEVICE — Device

## (undated) DEVICE — 40580 - THE PINK PAD - ADVANCED TRENDELENBURG POSITIONING KIT: Brand: 40580 - THE PINK PAD - ADVANCED TRENDELENBURG POSITIONING KIT

## (undated) DEVICE — STERILE POLYISOPRENE POWDER-FREE SURGICAL GLOVES: Brand: PROTEXIS

## (undated) DEVICE — NEEDLE HYPO 25GA L1.5IN BVL ORIENTED ECLIPSE

## (undated) DEVICE — SUTURE VCRL SZ 0 L27IN ABSRB VLT L36MM UR-5 5/8 CIR J376H

## (undated) DEVICE — 40418 TRENDELENBURG ONE-STEP ARM PROTECTORS LARGE (1 PAIR): Brand: 40418 TRENDELENBURG ONE-STEP ARM PROTECTORS LARGE (1 PAIR)

## (undated) DEVICE — DEVON™ KNEE AND BODY STRAP 60" X 3" (1.5 M X 7.6 CM): Brand: DEVON

## (undated) DEVICE — APPLIER CLP M/L SHFT DIA5MM 15 LIG LIGAMAX 5

## (undated) DEVICE — OBTRTR BLDELSS 8MM DISP -- DA VINCI - SNGL USE

## (undated) DEVICE — VISUALIZATION SYSTEM: Brand: CLEARIFY

## (undated) DEVICE — ROUND SHAPE BALLOON: Brand: PDB

## (undated) DEVICE — TIP COVER ACCESSORY

## (undated) DEVICE — DRAPE,UTILITY,TAPE,15X26,STERILE: Brand: MEDLINE

## (undated) DEVICE — REM POLYHESIVE ADULT PATIENT RETURN ELECTRODE: Brand: VALLEYLAB

## (undated) DEVICE — BARRIER TISS ADH ABSRB 3X4IN -- GYNECARE INTERCEED

## (undated) DEVICE — INTENDED FOR TISSUE SEPARATION, AND OTHER PROCEDURES THAT REQUIRE A SHARP SURGICAL BLADE TO PUNCTURE OR CUT.: Brand: BARD-PARKER ® CARBON RIB-BACK BLADES

## (undated) DEVICE — VCARE MEDIUM, UTERINE MANIPULATOR, VAGINAL-CERVICAL-AHLUWALIA'S-RETRACTOR-ELEVATOR: Brand: VCARE